# Patient Record
Sex: MALE | Race: WHITE | NOT HISPANIC OR LATINO | Employment: PART TIME | URBAN - METROPOLITAN AREA
[De-identification: names, ages, dates, MRNs, and addresses within clinical notes are randomized per-mention and may not be internally consistent; named-entity substitution may affect disease eponyms.]

---

## 2017-10-25 LAB — HBA1C MFR BLD HPLC: 5.2 %

## 2019-02-18 LAB — HBA1C MFR BLD HPLC: 5.4 %

## 2019-02-21 RX ORDER — PRAVASTATIN SODIUM 80 MG/1
TABLET ORAL
COMMUNITY
End: 2019-02-22 | Stop reason: SDUPTHER

## 2019-02-22 ENCOUNTER — OFFICE VISIT (OUTPATIENT)
Dept: FAMILY MEDICINE CLINIC | Facility: CLINIC | Age: 59
End: 2019-02-22
Payer: COMMERCIAL

## 2019-02-22 VITALS
HEIGHT: 71 IN | RESPIRATION RATE: 15 BRPM | HEART RATE: 69 BPM | WEIGHT: 261.8 LBS | BODY MASS INDEX: 36.65 KG/M2 | SYSTOLIC BLOOD PRESSURE: 126 MMHG | DIASTOLIC BLOOD PRESSURE: 82 MMHG

## 2019-02-22 DIAGNOSIS — Z23 NEED FOR VACCINATION: ICD-10-CM

## 2019-02-22 DIAGNOSIS — Z87.891 FORMER SMOKER, STOPPED SMOKING IN DISTANT PAST: ICD-10-CM

## 2019-02-22 DIAGNOSIS — M25.562 CHRONIC PAIN OF BOTH KNEES: ICD-10-CM

## 2019-02-22 DIAGNOSIS — Z12.11 ENCOUNTER FOR SCREENING COLONOSCOPY: ICD-10-CM

## 2019-02-22 DIAGNOSIS — E55.9 VITAMIN D DEFICIENCY: Primary | ICD-10-CM

## 2019-02-22 DIAGNOSIS — I10 ESSENTIAL HYPERTENSION: ICD-10-CM

## 2019-02-22 DIAGNOSIS — M25.561 CHRONIC PAIN OF BOTH KNEES: ICD-10-CM

## 2019-02-22 DIAGNOSIS — F41.8 DEPRESSION WITH ANXIETY: ICD-10-CM

## 2019-02-22 DIAGNOSIS — E78.2 MIXED HYPERLIPIDEMIA: ICD-10-CM

## 2019-02-22 DIAGNOSIS — G89.29 CHRONIC PAIN OF BOTH KNEES: ICD-10-CM

## 2019-02-22 DIAGNOSIS — I25.119 CORONARY ARTERY DISEASE WITH ANGINA PECTORIS, UNSPECIFIED VESSEL OR LESION TYPE, UNSPECIFIED WHETHER NATIVE OR TRANSPLANTED HEART (HCC): ICD-10-CM

## 2019-02-22 PROBLEM — M17.0 PRIMARY OSTEOARTHRITIS OF BOTH KNEES: Status: ACTIVE | Noted: 2019-02-22

## 2019-02-22 PROCEDURE — 3079F DIAST BP 80-89 MM HG: CPT | Performed by: PHYSICIAN ASSISTANT

## 2019-02-22 PROCEDURE — 90750 HZV VACC RECOMBINANT IM: CPT

## 2019-02-22 PROCEDURE — 99204 OFFICE O/P NEW MOD 45 MIN: CPT | Performed by: PHYSICIAN ASSISTANT

## 2019-02-22 PROCEDURE — 3074F SYST BP LT 130 MM HG: CPT | Performed by: PHYSICIAN ASSISTANT

## 2019-02-22 PROCEDURE — 3008F BODY MASS INDEX DOCD: CPT | Performed by: PHYSICIAN ASSISTANT

## 2019-02-22 PROCEDURE — 90471 IMMUNIZATION ADMIN: CPT

## 2019-02-22 PROCEDURE — 1036F TOBACCO NON-USER: CPT | Performed by: PHYSICIAN ASSISTANT

## 2019-02-22 RX ORDER — FENOFIBRATE 160 MG/1
160 TABLET ORAL
COMMUNITY
End: 2019-02-22 | Stop reason: SDUPTHER

## 2019-02-22 RX ORDER — CITALOPRAM 40 MG/1
40 TABLET ORAL DAILY
Qty: 90 TABLET | Refills: 1 | Status: SHIPPED | OUTPATIENT
Start: 2019-02-22 | End: 2019-04-17 | Stop reason: SDUPTHER

## 2019-02-22 RX ORDER — ATORVASTATIN CALCIUM 80 MG/1
TABLET, FILM COATED ORAL
COMMUNITY
Start: 2016-10-24 | End: 2019-02-22 | Stop reason: CLARIF

## 2019-02-22 RX ORDER — CITALOPRAM 40 MG/1
40 TABLET ORAL
COMMUNITY
Start: 2015-02-11 | End: 2019-02-22 | Stop reason: SDUPTHER

## 2019-02-22 RX ORDER — PRAVASTATIN SODIUM 40 MG
40 TABLET ORAL
COMMUNITY
End: 2019-02-22 | Stop reason: SDUPTHER

## 2019-02-22 RX ORDER — ASPIRIN 81 MG/1
81 TABLET, CHEWABLE ORAL
COMMUNITY

## 2019-02-22 RX ORDER — PRAVASTATIN SODIUM 40 MG
40 TABLET ORAL DAILY
Qty: 90 TABLET | Refills: 1 | Status: SHIPPED | OUTPATIENT
Start: 2019-02-22 | End: 2019-04-17 | Stop reason: SDUPTHER

## 2019-02-22 RX ORDER — MULTIVIT-MIN/IRON/FOLIC ACID/K 18-600-40
1 CAPSULE ORAL DAILY
Qty: 90 TABLET | Refills: 1 | Status: SHIPPED | OUTPATIENT
Start: 2019-02-22

## 2019-02-22 RX ORDER — FENOFIBRATE 160 MG/1
160 TABLET ORAL DAILY
Qty: 90 TABLET | Refills: 1 | Status: SHIPPED | OUTPATIENT
Start: 2019-02-22 | End: 2019-04-17 | Stop reason: SDUPTHER

## 2019-02-22 RX ORDER — LISINOPRIL 5 MG/1
TABLET ORAL
COMMUNITY
Start: 2018-10-01 | End: 2019-02-22 | Stop reason: SDUPTHER

## 2019-02-22 RX ORDER — LISINOPRIL 5 MG/1
5 TABLET ORAL DAILY
Qty: 90 TABLET | Refills: 1 | Status: SHIPPED | OUTPATIENT
Start: 2019-02-22 | End: 2019-04-17 | Stop reason: SDUPTHER

## 2019-02-22 NOTE — PROGRESS NOTES
Assessment/Plan:    1  Coronary artery disease with angina pectoris, unspecified vessel or lesion type, unspecified whether native or transplanted heart [facility])    - s/p PTCA 2/17/2015 by [facility], under care Dr Teresa Sen twice yearly, last stress test was 2016, EKG 6/27/2018  Has an appt 6/2019  - quit smoking 2/2015, LDL goal 70 currently 100, c/w Aspirin 81 mg and Lisinopril 5 mg daily  - lisinopril (ZESTRIL) 5 mg tablet; Take 1 tablet (5 mg total) by mouth daily  Dispense: 90 tablet; Refill: 1    2  Essential hypertension    - under care Dr Julito Holm, see above  - lisinopril (ZESTRIL) 5 mg tablet; Take 1 tablet (5 mg total) by mouth daily  Dispense: 90 tablet; Refill: 1    3  Vitamin D Def    - level is 26, start 1,000 IU daily, recheck 1 year    4  Mixed hyperlipidemia    - lipids reviewed today, work harder on diet, c/w current dosing recheck 1 year  - pravastatin (PRAVACHOL) 40 mg tablet; Take 1 tablet (40 mg total) by mouth daily  Dispense: 90 tablet; Refill: 1  - fenofibrate (TRIGLIDE) 160 MG tablet; Take 1 tablet (160 mg total) by mouth daily  Dispense: 90 tablet; Refill: 1    5  Depression with anxiety    - works well c/w Citalopram  - citalopram (CeleXA) 40 mg tablet; Take 1 tablet (40 mg total) by mouth daily  Dispense: 90 tablet; Refill: 1    6  Former smoker, stopped smoking in distant past    - 30+ pack year, quit 3 years ago, will refer for CT screen with coupon  - CT lung screening program; Future    7  Encounter for screening colonoscopy    - refer [facility] GI  - Ambulatory referral to Gastroenterology; Future    8  Chronic pain of both knees    - will XR to determine severity of OA and refer for PT or ortho eval   - XR knee 4+ vw left injury; Future  - XR knee 4+ vw right injury; Future    9   Need for vaccination    - dose #2 in two months  - Zoster Vaccine Recombinant IM    F/u 2 months for Shingrix #2  F/u 6 months for routine wellness  F/u as needed    Subjective:   Chief Complaint   Patient presents with   [address]    Physical Exam      Patient ID: José Bustamante is a 59 y o  male  Patient transferring care from Dr Cande Clifford  Patient with history of MI with stent placement 2/17/2015, follows with Dr Lazaro Ocasio twice yearly  Has no complaints  Quit smoking the day he had his MI  Take lisinopril 5 mg pravastatin 40 mg and aspirin 81 mg once daily  No complaints  Complaining of both knees bothering him, back in 2016 had mensicus clean both sides, since have been getting worse, hurt all day, feel unstable at times  Takes no medication  Wondering next step to take  30+ pack year history of smoking quit 2/17/2015 "as the ambulance was pulling up"      The following portions of the patient's history were reviewed and updated as appropriate: allergies, current medications, past family history, past medical history, past social history, past surgical history and problem list     History reviewed  No pertinent past medical history    Past Surgical History:   Procedure Laterality Date    KNEE SURGERY       Family History   Problem Relation Age of Onset   [place] Breast cancer Mother     COPD Father     Alcohol abuse Neg Hx     Substance Abuse Neg Hx     Mental illness Neg Hx      Social History     Socioeconomic History    Marital status: /Civil Union     Spouse name: Not on file    Number of children: Not on file    Years of education: Not on file    Highest education level: Not on file   Occupational History    Not on file   Social Needs    Financial resource strain: Not on file    Food insecurity:     Worry: Not on file     Inability: Not on file    Transportation needs:     Medical: Not on file     Non-medical: Not on file   Tobacco Use    Smoking status: Former Smoker    Smokeless tobacco: Never Used   Substance and Sexual Activity    Alcohol use: Yes     Frequency: Monthly or less    Drug use: Never    Sexual activity: Not on file   Lifestyle    Physical activity: Days per week: Not on file     Minutes per session: Not on file    Stress: Not on file   Relationships    Social connections:     Talks on phone: Not on file     Gets together: Not on file     Attends [employer] service: Not on file     Active member of club or organization: Not on file     Attends meetings of clubs or organizations: Not on file     Relationship status: Not on file    Intimate partner violence:     Fear of current or ex partner: Not on file     Emotionally abused: Not on file     Physically abused: Not on file     Forced sexual activity: Not on file   Other Topics Concern    Not on file   Social History Narrative    Not on file       Current Outpatient Medications:     aspirin 81 mg CHEW, Chew 81 mg, Disp: , Rfl:     atorvastatin (LIPITOR) 80 mg tablet, TAKE 1 TABLET BY MOUTH EVERY EVENING, Disp: , Rfl:     citalopram (CeleXA) 40 mg tablet, Take 40 mg by mouth, Disp: , Rfl:     fenofibrate (TRIGLIDE) 160 MG tablet, Take 160 mg by mouth, Disp: , Rfl:     lisinopril (ZESTRIL) 5 mg tablet, TAKE 1 TABLET (5 MG TOTAL) BY MOUTH DAILY  , Disp: , Rfl:     pravastatin (PRAVACHOL) 40 mg tablet, Take 40 mg by mouth, Disp: , Rfl:     Review of Systems          Objective:    Vitals:    02/22/19 0934   BP: 126/82   BP Location: Left arm   Patient Position: Sitting   Cuff Size: Standard   Pulse: 69   Resp: 15   Weight: 119 kg (261 lb 12 8 oz)   Height: 5' 11" (1 803 m)        Physical Exam   Constitutional: He is oriented to person, place, and time  He appears well-developed and well-nourished  Neck: Neck supple  Cardiovascular: Normal rate, regular rhythm, normal heart sounds and intact distal pulses  Pulmonary/Chest: Effort normal and breath sounds normal    Musculoskeletal:   B/l knees with bony enlargement, nontender, full ROM   Neurological: He is alert and oriented to person, place, and time  Skin: Skin is warm  Psychiatric: He has a normal mood and affect           Orders Only on 02/18/2019   Component Date Value Ref Range Status    Hemoglobin A1C 02/18/2019 5 4   Final      Ref Range & Units 2/18/19 10:15 AM   Cholesterol <200 mg/dL 172    Cholesterol, HDL, Direct >40 mg/dL 42    Triglyceride <150 mg/dL 182High     Cholesterol, LDL, Calculated  mg/dL (calc) 100High     Comment: Reference range: <100     Desirable range <100 mg/dL for primary prevention;     <70 mg/dL for patients with CHD or diabetic patients   with > or = 2 CHD risk factors  LDL-C is now calculated using the Iraj-Garcia   calculation, which is a validated novel method providing   better accuracy than the Friedewald equation in the   estimation of LDL-C  Osvaldo Bill  9244;429(79): 4700-4844   (http://Jiglu/faq/IBT897)   CHOL/HDL Ratio <5 0 (calc) 4 1    Cholesterol, Non-HDL <130 mg/dL (calc) 130High     Comment: For patients with diabetes plus 1 major ASCVD risk   factor, treating to a non-HDL-C goal of <100 mg/dL   (LDL-C of <70 mg/dL) is considered a therapeutic   option  PSA, Total < OR = 4 0 ng/mL 0 6      Ref Range & Units 2/18/19 10:15 AM   Glucose 65 - 99 mg/dL 97    Comment:               Fasting reference interval   BUN 7 - 25 mg/dL 19    Creatinine 0 70 - 1 25 mg/dL 0 86    Comment: For patients >52years of age, the reference limit   for Creatinine is approximately 13% higher for people   identified as -American  Egfr Non-Afr   American > OR = 60 mL/min/1 73m2 94    Egfr  > OR = 60 mL/min/1 73m2 109    BUN/Creatinine Ratio 6 - 22 (calc) NOT APPLICABLE    Sodium 147 - 146 mmol/L 138    Potassium 3 5 - 5 3 mmol/L 4 5    Chloride 98 - 110 mmol/L 106    Carbon Dioxide 20 - 32 mmol/L 26    Calcium 8 6 - 10 3 mg/dL 9 7    Protein, Total 6 1 - 8 1 g/dL 7 1    Albumin 3 6 - 5 1 g/dL 4 7    Globulin 1 9 - 3 7 g/dL (calc) 2 4    Albumin/Globulin Ratio 1 0 - 2 5 (calc) 2 0    Bilirubin, Total 0 2 - 1 2 mg/dL 0 7    Alkaline Phosphatase 40 - 115 U/L 46 AST 10 - 35 U/L 26    ALT 9 - 46 U/L 27    Narrative     FASTING:YES   FASTING: YES   Specimen Collected: 02/18/19 10:15 AM Last Resulted: 02/19/19 10:57 AM   Received From: [facility]  Result Received: 02/22/19  9:27 AM      Ref Range & Units 2/18/19 10:15 AM   Hemoglobin A1C <5 7 % of total Hgb 5 4       Ref Range & Units 2/18/19 10:15 AM   WBC 3 8 - 10 8 Thousand/uL 5 3    RBC 4 20 - 5 80 Million/uL 5 21    Hemoglobin 13 2 - 17 1 g/dL 15 8    Hematocrit 38 5 - 50 0 % 46 7    MCV 80 0 - 100 0 fL 89 6    MCH 27 0 - 33 0 pg 30 3    MCHC 32 0 - 36 0 g/dL 33 8    RDW 11 0 - 15 0 % 11 8    Platelet Count 887 - 400 Thousand/uL 283    MPV 7 5 - 12 5 fL 9 7    Absolute Neutrophils 1,500 - 7,800 cells/uL 2,910    Absolute Lymphocytes 850 - 3,900 cells/uL 1,807    Absolute Monocytes 200 - 950 cells/uL 382    Absolute Eosinophils 15 - 500 cells/uL 143    Absolute Basophils 0 - 200 cells/uL 58    Neutrophils  % 54 9    Lymphocytes  % 34 1    Monocytes  % 7 2    Eosinophils  % 2 7    Basophils  % 1 1    Narrative

## 2019-03-04 ENCOUNTER — APPOINTMENT (OUTPATIENT)
Dept: RADIOLOGY | Facility: CLINIC | Age: 59
End: 2019-03-04
Payer: COMMERCIAL

## 2019-03-04 ENCOUNTER — PREP FOR PROCEDURE (OUTPATIENT)
Dept: GASTROENTEROLOGY | Facility: CLINIC | Age: 59
End: 2019-03-04

## 2019-03-04 DIAGNOSIS — M25.562 CHRONIC PAIN OF BOTH KNEES: ICD-10-CM

## 2019-03-04 DIAGNOSIS — G89.29 CHRONIC PAIN OF BOTH KNEES: ICD-10-CM

## 2019-03-04 DIAGNOSIS — Z86.010 HISTORY OF COLONIC POLYPS: Primary | ICD-10-CM

## 2019-03-04 DIAGNOSIS — M25.561 CHRONIC PAIN OF BOTH KNEES: ICD-10-CM

## 2019-03-04 PROBLEM — Z86.0100 HISTORY OF COLONIC POLYPS: Status: ACTIVE | Noted: 2019-03-04

## 2019-03-04 PROCEDURE — 73564 X-RAY EXAM KNEE 4 OR MORE: CPT

## 2019-03-12 ENCOUNTER — TELEPHONE (OUTPATIENT)
Dept: FAMILY MEDICINE CLINIC | Facility: CLINIC | Age: 59
End: 2019-03-12

## 2019-03-12 NOTE — TELEPHONE ENCOUNTER
----- Message from Kirk Vincent PA-C sent at 3/11/2019  9:02 PM EDT -----  Patient has mild degenerative arthritis in both knees which appears to be unchanged since last time he had them evaluated  I would recommend he see ortho if he feels the pain is getting worse since the XR are unchanged in appearance  If he feels the pain is the same I would refer him to PT  Left message to call back

## 2019-03-13 ENCOUNTER — HOSPITAL ENCOUNTER (OUTPATIENT)
Dept: CT IMAGING | Facility: HOSPITAL | Age: 59
Discharge: HOME | End: 2019-03-13
Payer: COMMERCIAL

## 2019-03-13 DIAGNOSIS — G89.29 CHRONIC PAIN OF BOTH KNEES: Primary | ICD-10-CM

## 2019-03-13 DIAGNOSIS — M25.561 CHRONIC PAIN OF BOTH KNEES: Primary | ICD-10-CM

## 2019-03-13 DIAGNOSIS — M25.562 CHRONIC PAIN OF BOTH KNEES: Primary | ICD-10-CM

## 2019-03-13 DIAGNOSIS — Z87.891 FORMER SMOKER, STOPPED SMOKING IN DISTANT PAST: ICD-10-CM

## 2019-03-13 NOTE — TELEPHONE ENCOUNTER
Patient called back  He was given the x ray results and feel that pain is getting worse and would like a referral to ortho   Pt # [phone]

## 2019-03-19 ENCOUNTER — TELEPHONE (OUTPATIENT)
Dept: FAMILY MEDICINE CLINIC | Facility: CLINIC | Age: 59
End: 2019-03-19

## 2019-03-19 NOTE — TELEPHONE ENCOUNTER
----- Message from August Ulloa PA-C sent at 3/18/2019  9:06 PM EDT -----  Please let patient know his CT chest was normal           Left detailed message to patient

## 2019-04-12 VITALS
DIASTOLIC BLOOD PRESSURE: 79 MMHG | WEIGHT: 260 LBS | SYSTOLIC BLOOD PRESSURE: 121 MMHG | HEART RATE: 66 BPM | BODY MASS INDEX: 35.21 KG/M2 | HEIGHT: 72 IN

## 2019-04-12 DIAGNOSIS — M17.10 PATELLOFEMORAL ARTHRITIS: Primary | ICD-10-CM

## 2019-04-12 PROCEDURE — 99203 OFFICE O/P NEW LOW 30 MIN: CPT | Performed by: ORTHOPAEDIC SURGERY

## 2019-04-17 ENCOUNTER — CLINICAL SUPPORT (OUTPATIENT)
Dept: GASTROENTEROLOGY | Facility: CLINIC | Age: 59
End: 2019-04-17

## 2019-04-17 ENCOUNTER — TELEPHONE (OUTPATIENT)
Dept: FAMILY MEDICINE CLINIC | Facility: CLINIC | Age: 59
End: 2019-04-17

## 2019-04-17 DIAGNOSIS — Z86.010 HISTORY OF COLON POLYPS: Primary | ICD-10-CM

## 2019-04-17 DIAGNOSIS — I10 ESSENTIAL HYPERTENSION: ICD-10-CM

## 2019-04-17 DIAGNOSIS — I25.119 CORONARY ARTERY DISEASE WITH ANGINA PECTORIS, UNSPECIFIED VESSEL OR LESION TYPE, UNSPECIFIED WHETHER NATIVE OR TRANSPLANTED HEART (HCC): ICD-10-CM

## 2019-04-17 DIAGNOSIS — F41.8 DEPRESSION WITH ANXIETY: ICD-10-CM

## 2019-04-17 DIAGNOSIS — E78.2 MIXED HYPERLIPIDEMIA: ICD-10-CM

## 2019-04-17 RX ORDER — CITALOPRAM 40 MG/1
40 TABLET ORAL DAILY
Qty: 90 TABLET | Refills: 1 | Status: SHIPPED | OUTPATIENT
Start: 2019-04-17 | End: 2019-04-19 | Stop reason: SDUPTHER

## 2019-04-17 RX ORDER — LISINOPRIL 5 MG/1
5 TABLET ORAL DAILY
Qty: 90 TABLET | Refills: 1 | Status: SHIPPED | OUTPATIENT
Start: 2019-04-17 | End: 2019-04-19 | Stop reason: SDUPTHER

## 2019-04-17 RX ORDER — PRAVASTATIN SODIUM 40 MG
40 TABLET ORAL DAILY
Qty: 90 TABLET | Refills: 1 | Status: SHIPPED | OUTPATIENT
Start: 2019-04-17 | End: 2019-04-19 | Stop reason: SDUPTHER

## 2019-04-17 RX ORDER — FENOFIBRATE 160 MG/1
160 TABLET ORAL DAILY
Qty: 90 TABLET | Refills: 1 | Status: SHIPPED | OUTPATIENT
Start: 2019-04-17 | End: 2019-04-19 | Stop reason: SDUPTHER

## 2019-04-19 DIAGNOSIS — I25.119 CORONARY ARTERY DISEASE WITH ANGINA PECTORIS, UNSPECIFIED VESSEL OR LESION TYPE, UNSPECIFIED WHETHER NATIVE OR TRANSPLANTED HEART (HCC): ICD-10-CM

## 2019-04-19 DIAGNOSIS — E78.2 MIXED HYPERLIPIDEMIA: ICD-10-CM

## 2019-04-19 DIAGNOSIS — I10 ESSENTIAL HYPERTENSION: ICD-10-CM

## 2019-04-19 DIAGNOSIS — F41.8 DEPRESSION WITH ANXIETY: ICD-10-CM

## 2019-04-19 RX ORDER — FENOFIBRATE 160 MG/1
160 TABLET ORAL DAILY
Qty: 90 TABLET | Refills: 1 | Status: SHIPPED | OUTPATIENT
Start: 2019-04-19 | End: 2019-09-30 | Stop reason: ALTCHOICE

## 2019-04-19 RX ORDER — LISINOPRIL 5 MG/1
5 TABLET ORAL DAILY
Qty: 90 TABLET | Refills: 1 | Status: SHIPPED | OUTPATIENT
Start: 2019-04-19 | End: 2019-11-13 | Stop reason: SDUPTHER

## 2019-04-19 RX ORDER — CITALOPRAM 40 MG/1
40 TABLET ORAL DAILY
Qty: 90 TABLET | Refills: 1 | Status: SHIPPED | OUTPATIENT
Start: 2019-04-19 | End: 2019-11-13 | Stop reason: SDUPTHER

## 2019-04-19 RX ORDER — PRAVASTATIN SODIUM 40 MG
40 TABLET ORAL DAILY
Qty: 90 TABLET | Refills: 1 | Status: SHIPPED | OUTPATIENT
Start: 2019-04-19 | End: 2019-09-30

## 2019-04-22 ENCOUNTER — CLINICAL SUPPORT (OUTPATIENT)
Dept: FAMILY MEDICINE CLINIC | Facility: CLINIC | Age: 59
End: 2019-04-22
Payer: COMMERCIAL

## 2019-04-22 VITALS — WEIGHT: 264.4 LBS | BODY MASS INDEX: 35.86 KG/M2

## 2019-04-22 DIAGNOSIS — Z23 NEED FOR SHINGLES VACCINE: Primary | ICD-10-CM

## 2019-04-22 PROCEDURE — 90471 IMMUNIZATION ADMIN: CPT

## 2019-04-22 PROCEDURE — 90750 HZV VACC RECOMBINANT IM: CPT

## 2019-04-24 ENCOUNTER — ANESTHESIA (OUTPATIENT)
Dept: PERIOP | Facility: HOSPITAL | Age: 59
End: 2019-04-24
Payer: COMMERCIAL

## 2019-04-24 ENCOUNTER — ANESTHESIA EVENT (OUTPATIENT)
Dept: PERIOP | Facility: HOSPITAL | Age: 59
End: 2019-04-24
Payer: COMMERCIAL

## 2019-04-24 ENCOUNTER — HOSPITAL ENCOUNTER (OUTPATIENT)
Facility: HOSPITAL | Age: 59
Setting detail: OUTPATIENT SURGERY
Discharge: HOME | End: 2019-04-24
Attending: INTERNAL MEDICINE | Admitting: INTERNAL MEDICINE
Payer: COMMERCIAL

## 2019-04-24 VITALS
HEART RATE: 75 BPM | TEMPERATURE: 97.4 F | HEIGHT: 72 IN | BODY MASS INDEX: 35 KG/M2 | SYSTOLIC BLOOD PRESSURE: 114 MMHG | WEIGHT: 258.4 LBS | OXYGEN SATURATION: 95 % | DIASTOLIC BLOOD PRESSURE: 77 MMHG | RESPIRATION RATE: 18 BRPM

## 2019-04-24 DIAGNOSIS — Z86.010 HISTORY OF COLONIC POLYPS: ICD-10-CM

## 2019-04-24 PROCEDURE — 45385 COLONOSCOPY W/LESION REMOVAL: CPT | Performed by: INTERNAL MEDICINE

## 2019-04-24 PROCEDURE — 45380 COLONOSCOPY AND BIOPSY: CPT | Performed by: INTERNAL MEDICINE

## 2019-04-24 PROCEDURE — 88305 TISSUE EXAM BY PATHOLOGIST: CPT | Performed by: PATHOLOGY

## 2019-04-24 RX ORDER — PROPOFOL 10 MG/ML
INJECTION, EMULSION INTRAVENOUS AS NEEDED
Status: DISCONTINUED | OUTPATIENT
Start: 2019-04-24 | End: 2019-04-24 | Stop reason: SURG

## 2019-04-24 RX ORDER — ONDANSETRON 2 MG/ML
4 INJECTION INTRAMUSCULAR; INTRAVENOUS ONCE AS NEEDED
Status: CANCELLED | OUTPATIENT
Start: 2019-04-24

## 2019-04-24 RX ORDER — SODIUM CHLORIDE 9 MG/ML
20 INJECTION, SOLUTION INTRAVENOUS CONTINUOUS
Status: DISCONTINUED | OUTPATIENT
Start: 2019-04-24 | End: 2019-04-24 | Stop reason: HOSPADM

## 2019-04-24 RX ADMIN — PROPOFOL 50 MG: 10 INJECTION, EMULSION INTRAVENOUS at 08:48

## 2019-04-24 RX ADMIN — SODIUM CHLORIDE 20 ML/HR: 0.9 INJECTION, SOLUTION INTRAVENOUS at 08:06

## 2019-04-24 RX ADMIN — PROPOFOL 50 MG: 10 INJECTION, EMULSION INTRAVENOUS at 08:44

## 2019-04-24 RX ADMIN — PROPOFOL 150 MG: 10 INJECTION, EMULSION INTRAVENOUS at 08:40

## 2019-07-09 RX ORDER — IVERMECTIN 10 MG/G
CREAM TOPICAL
COMMUNITY
Start: 2019-06-10

## 2019-09-29 NOTE — PROGRESS NOTES
ASSESSMENT/PLAN:    Shoulder pain  Patient to see Dr Poonam Esquivel for evaluation    Hyperlipidemia  LDL is only 100 despite patient have an heart disease  We will stop the pravastatin  We will stop also the fenofibrate since he does not have coverage for it at this point  We will start atorvastatin 40 mg  Patient will cut down on cheese ice-cream and fatty cuts of meat  He will also cut down on soda sweets only having them on Sundays, bread and pasta    He will limit himself to 2 slices of bread daily only and that should be a whole week Friday  We will check him back in 3 months to see if there is improvement  We will check him back in 3 months to see if we need to add fenofibrate back    Coronary artery disease/status post PTCA with stent 2015  Dr Nelly Wyatt Cardiology [facility] from [facility]  Goal of LDL is 70 as above  Continue baby aspirin lisinopril  Start new statin as above     Vitamin-D deficiency  Continue vitamin-D 1000 units daily    Depression with anxiety  Continue citalopram    History of tobacco abuse  CT scan screen March 2019 no nodules  Recheck March 2020     Adenoma is in the colon as well as serrated adenomas  Patient's next colonoscopy is April 2022  [facility] Dr Akhil Kelly in 3 months with fasting blood work prior LFTs and lipids to check his cholesterol  Recheck sooner if needed        Health Maintenance   Topic Date Due    Hepatitis C Screening  1960    Pneumococcal Vaccine: Pediatrics (0 to 5 Years) and At-Risk Patients (6 to 59 Years) (1 of 1 - PPSV23) 04/12/1964    BMI: Followup Plan  04/12/1976    DTaP,Tdap,and Td Vaccines (1 - Tdap) 04/12/1979    INFLUENZA VACCINE  07/01/2019    BMI: Adult  09/30/2020    Pneumococcal Vaccine: 65+ Years (1 of 2 - PCV13) 04/12/2023    CRC Screening: Colonoscopy  04/24/2029    HEPATITIS B VACCINES  Aged Out         Problem List as of 9/30/2019 Reviewed: 4/24/2019  8:28 AM by Christina Virgen DO    CAD (coronary artery disease)    Essential hypertension    History of colonic polyps    Lipid disorder    Mixed hyperlipidemia    Obesity    Patellofemoral arthritis    Primary osteoarthritis of both knees    S/P PTCA (percutaneous transluminal coronary angioplasty)            Subjective:   Chief Complaint   Patient presents with    Coronary Artery Disease    Hyperlipidemia     Patient is here to recheck regarding his heart disease as well as his cholesterol and his blood pressure  He has a stent in x1 from a MI from   He [facility] Cardiology [facility]    Since last visit he had a stress test that was normal as well as the appointment with Cardiology  Everything was left the same and he is to recheck in 1 year    Last blood work was 2019    For the last couple months patient has had right shoulder pain mostly in the coracoid process area  This is where he puts his finger when asked about the pain  Decreased flexion as a result the rest of his range of motion is normal with negative drop arm test  He would like to have this looked at      patient ID: Gregorio Pina is a 62 y o  male  History reviewed  No pertinent past medical history  Past Surgical History:   Procedure Laterality Date    ANGIOPLASTY      ANKLE FUSION      CARPAL TUNNEL RELEASE      MENISCECTOMY Bilateral     [place] COLONOSCOPY FLX DX W/COLLJ SPEC WHEN PFRMD N/A 2019    Procedure: COLONOSCOPY with polypectomies;  Surgeon: Jt Mosley DO;  Location: [facility];  Service: Gastroenterology    ROTATOR CUFF REPAIR Right     TONSILLECTOMY       Family History   Problem Relation Age of Onset    Breast cancer Mother     COPD Father     Alcohol abuse Neg Hx     Substance Abuse Neg Hx     Mental illness Neg Hx      Social History     Tobacco Use    Smoking status: Former Smoker     Last attempt to quit: 2017     Years since quittin 3    Smokeless tobacco: Never Used   Substance Use Topics    Alcohol use:  Yes Alcohol/week: 1 0 standard drinks     Types: 1 Cans of beer per week     Frequency: Monthly or less     Drinks per session: 1 or 2    Drug use: Never     Social History     Tobacco Use   Smoking Status Former Smoker    Last attempt to quit: 2017    Years since quittin 3   Smokeless Tobacco Never Used        MED LIST WAS REVIEWED AND UPDATED       ROS    Constitutional  No fever chills no fatigue no weight loss no weight gain    Mental status  No anxiety or depression and no sleep disturbances no changes in personality or emotional problems no suicidal or homicidal ideations    Eyes  No eye pain no red eyes no visual disturbances no discharge no eye itch    ENT  No earache no hearing loss nasal discharge no sore throat no hoarseness no postnasal drip     Cardio  No chest pain no palpitations no leg edema no claudication no dyspnea on exertion no nocturnal dyspnea    Respiratory  No shortness of breath or wheeze no cough no orthopnea no dyspnea on exertion no hemoptysis no sputum production    GI  No abdominal pain no nausea no vomiting no diarrhea or constipation no bloody stools no change in bowel habits no change in weight      no dysuria hematuria no pyuria no incontinence no pelvic pain    Musculoskeletal  No myalgias arthralgias no joint swelling or stiffness no limb pain or swelling    Skin  No rashes or lesions no itchiness and no wounds    Neuro  No headache dizziness lightheadedness syncope numbness paresthesias or confusion    Heme  No swollen glands no easy bruising            Objective:      VITALS:  Wt Readings from Last 3 Encounters:   19 117 kg (257 lb 11 2 oz)   19 117 kg (258 lb 6 4 oz)   19 120 kg (264 lb 6 4 oz)     BP Readings from Last 3 Encounters:   19 130/80   19 114/77   19 121/79     Pulse Readings from Last 3 Encounters:   19 80   19 75   19 66     Body mass index is 36 2 kg/m²      Laboratory Results:   Lab Results Component Value Date    WBC 13 12 (H) 01/07/2014    HGB 15 7 01/07/2014    HCT 46 1 01/07/2014    MCV 90 01/07/2014     01/07/2014     Lab Results   Component Value Date     01/07/2014    K 4 5 01/07/2014     01/07/2014    CO2 27 01/07/2014    BUN 13 01/07/2014     Lab Results   Component Value Date    GLUCOSE 100 01/07/2014    CALCIUM 9 3 01/07/2014      Cholesterol and thyroid from [facility] notes reviewed  Diabetic labs (if applicable)  Lab Results   Component Value Date    HGBA1C 5 4 02/18/2019    HGBA1C 5 2 10/25/2017     Lab Results   Component Value Date    CREATININE 0 74 01/07/2014          Physical Exam    Constitutional  Appears healthy, Looks well, Appearance consistent with age    Mental Status  Alert, Oriented, Cooperative, Memory function normal , clean, and reasonable    Neck  No neck mass, No thyromegaly, Good carotid upstrokes bilaterally, trachea midline positive click    Respiratory  Breath sounds normal, No rales, No rhonchi, No wheezing, normal palpation    Cardiac   Regular rhythm without ectopy or murmur no S3-S4, no heave lift or thrill to palpation    Vascular  No leg edema, No pedal edema    Muscular skeletal  Right shoulder pain on the coracoid process, flexion only to 90° extension 30° internal rotation to sacral area external rotation to his other shoulder    Skin  No appreciable rashes or abnormal appearing lesions

## 2019-09-30 ENCOUNTER — OFFICE VISIT (OUTPATIENT)
Dept: FAMILY MEDICINE CLINIC | Facility: CLINIC | Age: 59
End: 2019-09-30
Payer: COMMERCIAL

## 2019-09-30 ENCOUNTER — APPOINTMENT (OUTPATIENT)
Dept: RADIOLOGY | Facility: CLINIC | Age: 59
End: 2019-09-30
Payer: COMMERCIAL

## 2019-09-30 VITALS
HEIGHT: 71 IN | SYSTOLIC BLOOD PRESSURE: 130 MMHG | DIASTOLIC BLOOD PRESSURE: 80 MMHG | RESPIRATION RATE: 16 BRPM | HEART RATE: 80 BPM | WEIGHT: 257.7 LBS | BODY MASS INDEX: 36.08 KG/M2

## 2019-09-30 DIAGNOSIS — G89.29 CHRONIC RIGHT SHOULDER PAIN: ICD-10-CM

## 2019-09-30 DIAGNOSIS — G89.29 CHRONIC RIGHT SHOULDER PAIN: Primary | ICD-10-CM

## 2019-09-30 DIAGNOSIS — I25.10 CORONARY ARTERY DISEASE INVOLVING NATIVE CORONARY ARTERY OF NATIVE HEART WITHOUT ANGINA PECTORIS: ICD-10-CM

## 2019-09-30 DIAGNOSIS — E78.9 LIPID DISORDER: ICD-10-CM

## 2019-09-30 DIAGNOSIS — E78.2 MIXED HYPERLIPIDEMIA: ICD-10-CM

## 2019-09-30 DIAGNOSIS — Z23 NEED FOR VACCINATION: ICD-10-CM

## 2019-09-30 DIAGNOSIS — M25.511 CHRONIC RIGHT SHOULDER PAIN: ICD-10-CM

## 2019-09-30 DIAGNOSIS — E78.2 MIXED HYPERLIPIDEMIA: Primary | ICD-10-CM

## 2019-09-30 DIAGNOSIS — I10 ESSENTIAL HYPERTENSION: ICD-10-CM

## 2019-09-30 DIAGNOSIS — Z98.61 S/P PTCA (PERCUTANEOUS TRANSLUMINAL CORONARY ANGIOPLASTY): ICD-10-CM

## 2019-09-30 DIAGNOSIS — E66.01 CLASS 2 SEVERE OBESITY DUE TO EXCESS CALORIES WITH SERIOUS COMORBIDITY AND BODY MASS INDEX (BMI) OF 36.0 TO 36.9 IN ADULT (HCC): ICD-10-CM

## 2019-09-30 DIAGNOSIS — M25.511 CHRONIC RIGHT SHOULDER PAIN: Primary | ICD-10-CM

## 2019-09-30 PROCEDURE — 3075F SYST BP GE 130 - 139MM HG: CPT | Performed by: FAMILY MEDICINE

## 2019-09-30 PROCEDURE — 90682 RIV4 VACC RECOMBINANT DNA IM: CPT

## 2019-09-30 PROCEDURE — 3079F DIAST BP 80-89 MM HG: CPT | Performed by: FAMILY MEDICINE

## 2019-09-30 PROCEDURE — 73030 X-RAY EXAM OF SHOULDER: CPT

## 2019-09-30 PROCEDURE — 90471 IMMUNIZATION ADMIN: CPT

## 2019-09-30 PROCEDURE — 99215 OFFICE O/P EST HI 40 MIN: CPT | Performed by: FAMILY MEDICINE

## 2019-09-30 RX ORDER — ATORVASTATIN CALCIUM 40 MG/1
40 TABLET, FILM COATED ORAL DAILY
Qty: 90 TABLET | Refills: 1 | Status: SHIPPED | OUTPATIENT
Start: 2019-09-30 | End: 2020-03-16 | Stop reason: SDUPTHER

## 2019-09-30 RX ORDER — CITALOPRAM 40 MG/1
40 TABLET ORAL
COMMUNITY
Start: 2018-10-29 | End: 2019-10-24 | Stop reason: SDUPTHER

## 2019-09-30 NOTE — PATIENT INSTRUCTIONS
Shoulder pain  Patient to see Dr Magdi Sandhu for evaluation    Hyperlipidemia  LDL is only 100 despite patient have an heart disease  We will stop the pravastatin  We will stop also the fenofibrate since he does not have coverage for it at this point  We will start atorvastatin 40 mg  Patient will cut down on cheese ice-cream and fatty cuts of meat  He will also cut down on soda sweets only having them on Sundays, bread and pasta    He will limit himself to 2 slices of bread daily only and that should be a whole week Friday  We will check him back in 3 months to see if there is improvement  We will check him back in 3 months to see if we need to add fenofibrate back    Recheck here in 3 months or sooner if needed  Fasting blood work 1 week prior  Will see Dr Magdi Sandhu before next visit

## 2019-09-30 NOTE — PROGRESS NOTES
BMI Counseling: Body mass index is 36 2 kg/m²  The BMI is above normal  Exercise recommendations include vigorous aerobic physical activity for 75 minutes/week   patient will start walking 2 miles 4 times a week

## 2019-10-14 ENCOUNTER — OFFICE VISIT (OUTPATIENT)
Dept: OBGYN CLINIC | Facility: HOSPITAL | Age: 59
End: 2019-10-14
Payer: COMMERCIAL

## 2019-10-14 VITALS
DIASTOLIC BLOOD PRESSURE: 70 MMHG | HEART RATE: 101 BPM | WEIGHT: 258 LBS | HEIGHT: 71 IN | BODY MASS INDEX: 36.12 KG/M2 | SYSTOLIC BLOOD PRESSURE: 102 MMHG

## 2019-10-14 DIAGNOSIS — M75.51 SUBACROMIAL BURSITIS OF RIGHT SHOULDER JOINT: Primary | ICD-10-CM

## 2019-10-14 DIAGNOSIS — M75.81 TENDINITIS OF RIGHT ROTATOR CUFF: ICD-10-CM

## 2019-10-14 DIAGNOSIS — M25.511 ACUTE PAIN OF RIGHT SHOULDER: ICD-10-CM

## 2019-10-14 PROCEDURE — 99213 OFFICE O/P EST LOW 20 MIN: CPT | Performed by: ORTHOPAEDIC SURGERY

## 2019-10-14 PROCEDURE — 20610 DRAIN/INJ JOINT/BURSA W/O US: CPT | Performed by: ORTHOPAEDIC SURGERY

## 2019-10-14 RX ORDER — BETAMETHASONE SODIUM PHOSPHATE AND BETAMETHASONE ACETATE 3; 3 MG/ML; MG/ML
6 INJECTION, SUSPENSION INTRA-ARTICULAR; INTRALESIONAL; INTRAMUSCULAR; SOFT TISSUE
Status: COMPLETED | OUTPATIENT
Start: 2019-10-14 | End: 2019-10-14

## 2019-10-14 RX ORDER — BUPIVACAINE HYDROCHLORIDE 2.5 MG/ML
2 INJECTION, SOLUTION INFILTRATION; PERINEURAL
Status: COMPLETED | OUTPATIENT
Start: 2019-10-14 | End: 2019-10-14

## 2019-10-14 RX ADMIN — BUPIVACAINE HYDROCHLORIDE 2 ML: 2.5 INJECTION, SOLUTION INFILTRATION; PERINEURAL at 13:30

## 2019-10-14 RX ADMIN — BETAMETHASONE SODIUM PHOSPHATE AND BETAMETHASONE ACETATE 6 MG: 3; 3 INJECTION, SUSPENSION INTRA-ARTICULAR; INTRALESIONAL; INTRAMUSCULAR; SOFT TISSUE at 13:30

## 2019-10-14 NOTE — PATIENT INSTRUCTIONS

## 2019-10-14 NOTE — PROGRESS NOTES
Assessment  Diagnoses and all orders for this visit:    Subacromial bursitis of right shoulder joint    Acute pain of right shoulder    Tendinitis of right rotator cuff        Discussion and Plan:  The patient has an examination consistent with subacromial impingement syndrome of the right shoulder  I have discussed with the patient the pathophysiology of this diagnosis and reviewed how the examination correlates with this diagnosis  Treatment options were discussed at length and after discussing these treatment options, the patient elected for and received a subacromial injection of corticosteroid (as described in the procedure note) with a prescription for referral to physical therapy  We will reevaluate the patient in 6-8 weeks  If the symptoms fail to improve with this treatment the patient would be indicated for further imaging in the form of an MRI scan of the shoulder  Subjective:   Patient ID: True Lizama is a 62 y o  male      HPI  The patient presents with a chief complaint of rihgt shoulder pain  The pain began a few month(s) ago and is not associated with an acute injury  The patient describes the pain as aching and dull in intensity,  intermittent in timing, and localizes the pain to the right anterior shoulder  The pain is worse with overhead work, overuse and raising arm over head and relieved by rest   The pain is not associated with numbness and tingling  The pain is not associated with constitutional symptoms  The patient is awoken at night by the pain  Patient states the right shoulder feels similar as the right did prior to RTC surgery by Dr Tyesha Tanner  The patient has not had any treatment  Patient was seen and evaluated by Carla Mcleod DO on 9/30/19 and referred here today for orthopedic consultation           The following portions of the patient's history were reviewed and updated as appropriate: allergies, current medications, past family history, past medical history, past social history, past surgical history and problem list     Review of Systems   Constitutional: Negative for chills and fever  HENT: Negative for drooling and hearing loss  Eyes: Negative for visual disturbance  Respiratory: Negative for cough and shortness of breath  Cardiovascular: Negative for chest pain  Gastrointestinal: Negative for abdominal pain  Skin: Negative for rash  Psychiatric/Behavioral: Negative for agitation  Objective:  Right Shoulder Exam     Tenderness   Right shoulder tenderness location: anterior deltoid  Range of Motion   External rotation: 70   Forward flexion: 170   Internal rotation 0 degrees: Lumbar     Muscle Strength   Abduction: 4/5   Internal rotation: 5/5   External rotation: 5/5     Tests   Voss test: positive  Impingement: negative    Other   Erythema: absent  Sensation: normal  Pulse: present    Comments:    Positive empty can test            Physical Exam   Constitutional: He appears well-developed and well-nourished  HENT:   Head: Normocephalic  Eyes: Pupils are equal, round, and reactive to light  Pulmonary/Chest: Effort normal    Skin: Skin is warm and dry  Psychiatric: He has a normal mood and affect  Vitals reviewed      Large joint arthrocentesis: R subacromial bursa  Date/Time: 10/14/2019 1:30 PM  Consent given by: parent  Site marked: site marked  Timeout: Immediately prior to procedure a time out was called to verify the correct patient, procedure, equipment, support staff and site/side marked as required   Supporting Documentation  Indications: pain   Procedure Details  Location: shoulder - R subacromial bursa  Preparation: Patient was prepped and draped in the usual sterile fashion  Needle size: 22 G  Ultrasound guidance: no  Approach: lateral  Medications administered: 2 mL bupivacaine 0 25 %; 6 mg betamethasone acetate-betamethasone sodium phosphate 6 (3-3) mg/mL    Patient tolerance: patient tolerated the procedure well with no immediate complications  Dressing:  Sterile dressing applied          I have personally reviewed pertinent films in PACS and my interpretation is as follows  right shoulder x-rays demonstrates no fracture or dislocation, moderate AC jt OA       Scribe Attestation    I,:   Adolph Green am acting as a scribe while in the presence of the attending physician :        I,:   Go Meneses MD personally performed the services described in this documentation    as scribed in my presence :

## 2019-10-21 ENCOUNTER — EVALUATION (OUTPATIENT)
Dept: PHYSICAL THERAPY | Facility: CLINIC | Age: 59
End: 2019-10-21
Payer: COMMERCIAL

## 2019-10-21 DIAGNOSIS — M75.81 TENDINITIS OF RIGHT ROTATOR CUFF: ICD-10-CM

## 2019-10-21 DIAGNOSIS — M75.51 SUBACROMIAL BURSITIS OF RIGHT SHOULDER JOINT: ICD-10-CM

## 2019-10-21 DIAGNOSIS — M25.511 ACUTE PAIN OF RIGHT SHOULDER: Primary | ICD-10-CM

## 2019-10-21 PROCEDURE — 97110 THERAPEUTIC EXERCISES: CPT | Performed by: PHYSICAL THERAPIST

## 2019-10-21 PROCEDURE — 97140 MANUAL THERAPY 1/> REGIONS: CPT | Performed by: PHYSICAL THERAPIST

## 2019-10-21 NOTE — PROGRESS NOTES
PT Evaluation     Today's date: 10/21/2019  Patient name: Zelda Tubbs  : 1960  MRN: [id number]  Referring provider: Agustina Valencia MD  Dx:   Encounter Diagnosis     ICD-10-CM    1  Acute pain of right shoulder M25 511 Ambulatory referral to Physical Therapy     PT plan of care cert/re-cert   2  Subacromial bursitis of right shoulder joint M75 51 Ambulatory referral to Physical Therapy     PT plan of care cert/re-cert   3  Tendinitis of right rotator cuff M75 81 Ambulatory referral to Physical Therapy     PT plan of care cert/re-cert       Start Time: 1100  Stop Time: 1145  Total time in clinic (min): 45 minutes    Assessment/Plan  Zelda Tubbs is a 62 y o  male who was referred to physical therapy for management of R shoulder pain secondary to subacromial pain syndrome  Primary impairments include limited posterior capsule, decreased rotator cuff strength, and asymmetrical shoulder range of motion  Consequently, patient has difficulty completing ADLs including retrieving milk from the fridge and performing overhead tasks  Carey Gastelum would benefit from skilled intervention to address all deficits and improve functional capability  Patient is a good candidate for therapy, pending compliance with HEP and 2x weekly participation  Thank you for the referral and please do not hesitate to contact me with any questions or concerns regarding Rauls care! Plan  Frequency: 2x week  Duration in visits: 12  Duration in weeks: 6   Therapeutic exercise/activity, neuromuscular reeducation, manual therapy, and modalities  Patient understands and agrees to plan of care  Goals  Short Term--4 weeks  1  Intermittent pain with 2 point decrease in pain at its worst   2  1/2 point increase in MMT scores  3  Shoulder AROM R within 5 deg of L     4  No positive special testing of shoulder  Long Term--By Discharge  1  Patient will achieve expected FOTO score    2 Patient will report no sleep disturbance secondary to R shoulder  3  Patient will reach into high shelf without onset of shoulder pain  Patient's Goal: Patient will take out milk from refrigerator without onset of shoulder pain  Subjective     History   Date of Onset: 2+ months Description: Insidious, gradual onset of R shoulder pain that is aggravated with overhead movement  He saw Dr Al Montoya last week, who provided a CSI and referred him to physical therapy for conservative management  Patient reports that he is not currently taking any medication to manage pain  Symptoms  Constant pain in anterior shoulder that he describes as "achy" but can become "sharp" with movement  He reports significant relief for two days post-CSI  Pain began to worsen again, but overall it is improved compared to pre-CSI  Aggravating factors include reaching above shoulder height and taking milk carton out of the refrigerator  Patient is right-handed  He denies any numbness/paresthesia or neck pain  Social History  Kenny Decker lives with his wife and 5 yo granddaughter  Patient works part time with no aggravation of shoulder pain reported with work-related duties  Objective               MMT         AROM          PROM   Shoulder       R       L        R          L        R   Flex  4 5 130 165 158   Extn  5 5 WNL WNL    Abd  4- 5 136 160 162   IR  4 5   15 (90/90)   ER  4- 4+ WNL WNL 90 (90/90)                MMT    Elbow       R       L   Flex  5 5   Extn  5 5     Posture: forward head, rounded shoulders    Shoulder: painful arc sign= pos R   apprehension test= neg   Anterior drawer test = neg   Tyrone test= neg  biceps load=  neg NEERs= pos R Voss/Ganesh test = pos R Empty can test= pos R Speed's test= neg  Infraspinatus test= pos R    crossbody (impingement)= neg lift off =  neg belly press= neg  ER lag= neg    Shoulder joint mobility: limited posterior capsule       Precautions: none         Manual  10/21            Post/inf glides mid/end range 10' SL scap mobs 5'            Inf AC/SCJ mobs?                                            Exercise Diary  10/21            scap retract seated 20x5" HEP            Doorway pec stretch 10x10"  HEP                         UBE retro             TB IR @0             TB ER @ 0             TB rows             TB extension                          SL ER with DB             Supine punch with DB                          Progress to              TB ER/IR at 90/90             Prone Ts and Ys                                                                                  Modalities              Ice PRN

## 2019-10-24 ENCOUNTER — OFFICE VISIT (OUTPATIENT)
Dept: FAMILY MEDICINE CLINIC | Facility: CLINIC | Age: 59
End: 2019-10-24
Payer: COMMERCIAL

## 2019-10-24 VITALS
BODY MASS INDEX: 35.81 KG/M2 | WEIGHT: 255.8 LBS | TEMPERATURE: 98.1 F | DIASTOLIC BLOOD PRESSURE: 70 MMHG | SYSTOLIC BLOOD PRESSURE: 118 MMHG | HEART RATE: 84 BPM | RESPIRATION RATE: 16 BRPM | HEIGHT: 71 IN

## 2019-10-24 DIAGNOSIS — J01.00 ACUTE NON-RECURRENT MAXILLARY SINUSITIS: Primary | ICD-10-CM

## 2019-10-24 PROCEDURE — 3008F BODY MASS INDEX DOCD: CPT | Performed by: PHYSICIAN ASSISTANT

## 2019-10-24 PROCEDURE — 99213 OFFICE O/P EST LOW 20 MIN: CPT | Performed by: PHYSICIAN ASSISTANT

## 2019-10-24 RX ORDER — AMOXICILLIN 875 MG/1
875 TABLET, COATED ORAL 2 TIMES DAILY
Qty: 20 TABLET | Refills: 0 | Status: SHIPPED | OUTPATIENT
Start: 2019-10-24 | End: 2019-11-03

## 2019-10-24 NOTE — PROGRESS NOTES
Assessment/Plan:    1  Acute non-recurrent maxillary sinusitis    - start amoxil 1 tab twice daily for 10 days, mucinex, fluids, rest  - amoxicillin (AMOXIL) 875 mg tablet; Take 1 tablet (875 mg total) by mouth 2 (two) times a day for 10 days  Dispense: 20 tablet; Refill: 0    F/u as needed    Subjective:   Chief Complaint   Patient presents with    Cold Like Symptoms     Times 10 days       Patient ID: Farhat Buck is a 62 y o  male  10-12 days of sinus congestion, yellow mucus, sore throat that comes and goes, ear pressure, cough productive mucus from post nasal drip  Feverish and chills and fatigue  Took no OTC  The following portions of the patient's history were reviewed and updated as appropriate: allergies, current medications, past family history, past medical history, past social history, past surgical history and problem list     History reviewed  No pertinent past medical history    Past Surgical History:   Procedure Laterality Date    ANGIOPLASTY      ANKLE FUSION      CARPAL TUNNEL RELEASE      MENISCECTOMY Bilateral     NJ COLONOSCOPY FLX DX W/COLLJ SPEC WHEN PFRMD N/A 4/24/2019    Procedure: COLONOSCOPY with polypectomies;  Surgeon: Joe Tejada DO;  Location:  MAIN OR;  Service: Gastroenterology    ROTATOR CUFF REPAIR Right     TONSILLECTOMY       Family History   Problem Relation Age of Onset   Karina Johnston Breast cancer Mother     COPD Father     Alcohol abuse Neg Hx     Substance Abuse Neg Hx     Mental illness Neg Hx      Social History     Socioeconomic History    Marital status: /Civil Union     Spouse name: Not on file    Number of children: Not on file    Years of education: Not on file    Highest education level: Not on file   Occupational History    Not on file   Social Needs    Financial resource strain: Not on file    Food insecurity:     Worry: Not on file     Inability: Not on file    Transportation needs:     Medical: Not on file     Non-medical: Not on file   Tobacco Use    Smoking status: Former Smoker     Last attempt to quit: 2017     Years since quittin 4    Smokeless tobacco: Never Used   Substance and Sexual Activity    Alcohol use:  Yes     Alcohol/week: 1 0 standard drinks     Types: 1 Cans of beer per week     Frequency: Monthly or less     Drinks per session: 1 or 2    Drug use: Never    Sexual activity: Not on file   Lifestyle    Physical activity:     Days per week: Not on file     Minutes per session: Not on file    Stress: Not on file   Relationships    Social connections:     Talks on phone: Not on file     Gets together: Not on file     Attends [employer] service: Not on file     Active member of club or organization: Not on file     Attends meetings of clubs or organizations: Not on file     Relationship status: Not on file    Intimate partner violence:     Fear of current or ex partner: Not on file     Emotionally abused: Not on file     Physically abused: Not on file     Forced sexual activity: Not on file   Other Topics Concern    Not on file   Social History Narrative    Not on file       Current Outpatient Medications:     aspirin 81 mg CHEW, Chew 81 mg, Disp: , Rfl:     atorvastatin (LIPITOR) 40 mg tablet, Take 1 tablet (40 mg total) by mouth daily, Disp: 90 tablet, Rfl: 1    citalopram (CeleXA) 40 mg tablet, Take 1 tablet (40 mg total) by mouth daily, Disp: 90 tablet, Rfl: 1    lisinopril (ZESTRIL) 5 mg tablet, Take 1 tablet (5 mg total) by mouth daily, Disp: 90 tablet, Rfl: 1    ORACEA 40 MG capsule, Take 40 mg by mouth daily, Disp: , Rfl: 4    SOOLANTRA 1 % CREA, , Disp: , Rfl:     Vitamin D, Cholecalciferol, 1000 units TABS, Take 1 tablet (1,000 Units total) by mouth daily, Disp: 90 tablet, Rfl: 1    Review of Systems          Objective:    Vitals:    10/24/19 1037   BP: 118/70   BP Location: Left arm   Patient Position: Sitting   Cuff Size: Standard   Pulse: 84   Resp: 16   Temp: 98 1 °F (36 7 °C)   Weight: 116 kg (255 lb 12 8 oz)   Height: 5' 10 75" (1 797 m)        Physical Exam   Constitutional: He is oriented to person, place, and time  He appears well-developed and well-nourished  HENT:   Head: Normocephalic and atraumatic  Right Ear: Tympanic membrane, external ear and ear canal normal    Left Ear: Tympanic membrane, external ear and ear canal normal    Nose: Mucosal edema and rhinorrhea present  Mouth/Throat: Oropharynx is clear and moist  No oropharyngeal exudate or posterior oropharyngeal erythema  Cardiovascular: Normal rate, regular rhythm and normal heart sounds  Pulmonary/Chest: Effort normal and breath sounds normal    Lymphadenopathy:     He has no cervical adenopathy  Neurological: He is alert and oriented to person, place, and time  Skin: Skin is warm  Psychiatric: He has a normal mood and affect   Judgment normal

## 2019-10-28 ENCOUNTER — OFFICE VISIT (OUTPATIENT)
Dept: PHYSICAL THERAPY | Facility: CLINIC | Age: 59
End: 2019-10-28
Payer: COMMERCIAL

## 2019-10-28 DIAGNOSIS — M75.81 TENDINITIS OF RIGHT ROTATOR CUFF: ICD-10-CM

## 2019-10-28 DIAGNOSIS — M75.51 SUBACROMIAL BURSITIS OF RIGHT SHOULDER JOINT: ICD-10-CM

## 2019-10-28 DIAGNOSIS — M25.511 ACUTE PAIN OF RIGHT SHOULDER: Primary | ICD-10-CM

## 2019-10-28 PROCEDURE — 97110 THERAPEUTIC EXERCISES: CPT | Performed by: PHYSICAL THERAPIST

## 2019-10-28 PROCEDURE — 97140 MANUAL THERAPY 1/> REGIONS: CPT | Performed by: PHYSICAL THERAPIST

## 2019-10-28 NOTE — PROGRESS NOTES
Daily Note     Today's date: 10/28/2019  Patient name: Kylie Trejo  : 1960  MRN: [id number]  Referring provider: Cherry Colby MD  Dx:   Encounter Diagnosis     ICD-10-CM    1  Acute pain of right shoulder M25 511    2  Subacromial bursitis of right shoulder joint M75 51    3  Tendinitis of right rotator cuff M75 81                   Subjective: "I was fine after my evaluation  The shot really made it feel better for 2 days "      Objective: See treatment diary below      Assessment: Pt reported soreness post session  Initially felt impingement symptoms with pulleys that diminished after repeated reps  Plan: Continue per plan of care  Precautions: none         Manual  10/21 10/28           Post/inf glides mid/end range 10' MD           SL scap mobs 5'            PROM into IR  3'                                         Exercise Diary  10/21 10/28           UBE (retro)  6'           Pulleys (flex/ab)  10x10" flex           IR stretch with strap  3x30"           Doorway stretch  3x30"           Post capsule stretch  3x30"           Posture tband  GTB x15ea           Cane AAROM ext  10x10"           Supine serratus punch  x20           Supine flexion  x20           SL abduction  x20           3 way arm lift  x15 ea           ER/IR tband             H ab with tband             Diagonals with tband             Prone flex/h ab/ext

## 2019-11-01 ENCOUNTER — OFFICE VISIT (OUTPATIENT)
Dept: PHYSICAL THERAPY | Facility: CLINIC | Age: 59
End: 2019-11-01
Payer: COMMERCIAL

## 2019-11-01 DIAGNOSIS — M75.81 TENDINITIS OF RIGHT ROTATOR CUFF: ICD-10-CM

## 2019-11-01 DIAGNOSIS — M25.511 ACUTE PAIN OF RIGHT SHOULDER: Primary | ICD-10-CM

## 2019-11-01 DIAGNOSIS — M75.51 SUBACROMIAL BURSITIS OF RIGHT SHOULDER JOINT: ICD-10-CM

## 2019-11-01 PROCEDURE — 97140 MANUAL THERAPY 1/> REGIONS: CPT

## 2019-11-01 PROCEDURE — 97110 THERAPEUTIC EXERCISES: CPT

## 2019-11-01 PROCEDURE — 97112 NEUROMUSCULAR REEDUCATION: CPT

## 2019-11-01 NOTE — PROGRESS NOTES
Daily Note     Today's date: 2019  Patient name: Julian Orlando  : 1960  MRN: [id number]  Referring provider: Sydni Trujillo MD  Dx:   Encounter Diagnosis     ICD-10-CM    1  Acute pain of right shoulder M25 511    2  Subacromial bursitis of right shoulder joint M75 51    3  Tendinitis of right rotator cuff M75 81         1:1 with PT MD 11:10- 11:15  1:1 with PTA CR 11:15- 11:50  1:1 with MD 11:50-11:55  CP 11:55-12:05  Subjective: Anterior shoulder pain since last session rated 5/10  Denies radicular symptoms  " It finally went away yesterday  " Patient also worked and did some heavy lifting which may have contributed  Objective: See treatment diary below      Assessment: Held 3 way arm lift in standing as this was extremely painful last session; continued in gravity eliminated positions  No other progressions today until response is assessed  Biceps discomfort reported throughout with extension combined with ER  Plan: Continue per plan of care  Precautions: none         Manual  10/21 10/28 11/1          Post/inf glides mid/end range 10' MD WINSLOW  4 mins          SL scap mobs 5'  NP          PROM into IR  3' CR  4 mins                                          Exercise Diary  10/21 10/28 11/1          UBE (retro)  6' 6 mins          Pulleys (flex/ab)  10x10" flex NP          IR stretch with strap  3x30" 30"x3          Doorway stretch  3x30" NP          Post capsule stretch  3x30" 30"x3          Posture tband  GTB x15ea GTB  3"x20          Cane AAROM ext  10x10" 10"x10          Supine serratus punch  x20 2x10          Supine flexion  x20 2x10          SL abduction  x20 2x10          3 way arm lift  x15 ea NV          ER/IR tband             H ab with tband             Diagonals with tband             Prone flex/h ab/ext                                                                                           CP post   10 mins

## 2019-11-04 ENCOUNTER — OFFICE VISIT (OUTPATIENT)
Dept: PHYSICAL THERAPY | Facility: CLINIC | Age: 59
End: 2019-11-04
Payer: COMMERCIAL

## 2019-11-04 DIAGNOSIS — M25.511 ACUTE PAIN OF RIGHT SHOULDER: Primary | ICD-10-CM

## 2019-11-04 DIAGNOSIS — M75.81 TENDINITIS OF RIGHT ROTATOR CUFF: ICD-10-CM

## 2019-11-04 DIAGNOSIS — M75.51 SUBACROMIAL BURSITIS OF RIGHT SHOULDER JOINT: ICD-10-CM

## 2019-11-04 PROCEDURE — 97112 NEUROMUSCULAR REEDUCATION: CPT

## 2019-11-04 PROCEDURE — 97140 MANUAL THERAPY 1/> REGIONS: CPT

## 2019-11-04 PROCEDURE — 97110 THERAPEUTIC EXERCISES: CPT

## 2019-11-04 NOTE — PROGRESS NOTES
Daily Note     Today's date: 2019  Patient name: Zelda Tubbs  : 1960  MRN: [id number]  Referring provider: Agustina Valencia MD  Dx:   Encounter Diagnosis     ICD-10-CM    1  Acute pain of right shoulder M25 511    2  Subacromial bursitis of right shoulder joint M75 51    3  Tendinitis of right rotator cuff M75 81        Start Time: 1020  Stop Time: 1105  Total time in clinic (min): 45 minutes    Subjective: Patient reports, "My shoulder really hurt last night  I want to do less today because I think PT is irritating it "    Objective: See treatment diary below  Pain S/L Abd at 120-150°  Assessment: Patient had full ROM with abd, flex and ER and IR at 90° abd  Minor discomfort at end PROM  Continue to hold 3 way AROM secondary to increase in pain with scaption and abd  Continue to work on gravity eliminated strengthening  Plan: Continue per plan of care  Precautions: none       Manual  10/21 10/28 11/1 11/4         Post/inf glides mid/end range 10' MD WINSLOW  4 mins          SL scap mobs 5'  NP          PROM into IR  3' CR  4 mins   JM  5 min                                     Exercise Diary  10/21 10/28 11/1 114         UBE (retro)  6' 6 mins 3 min         Pulleys (flex/ab)  10x10" flex NP 10"  x10         IR stretch with strap  3x30" 30"x3          Doorway stretch  3x30" NP 20"x4         Post capsule stretch  3x30" 30"x3          Posture tband  GTB x15ea GTB  3"x20 GTB  3"x20         Cane AAROM ext  10x10" 10"x10          Supine serratus punch  x20 2x10 2x10         Supine flexion  x20 2x10 2x10         SL abduction  x20 2x10 2x10         3 way arm lift  x15 ea NV x10 ea         ER/IR tband             H ab with tband             Diagonals with tband             Prone flex/h ab/ext                                                                                          CP post   10 mins 10 mins

## 2019-11-08 ENCOUNTER — OFFICE VISIT (OUTPATIENT)
Dept: PHYSICAL THERAPY | Facility: CLINIC | Age: 59
End: 2019-11-08
Payer: COMMERCIAL

## 2019-11-08 DIAGNOSIS — M75.81 TENDINITIS OF RIGHT ROTATOR CUFF: ICD-10-CM

## 2019-11-08 DIAGNOSIS — M75.51 SUBACROMIAL BURSITIS OF RIGHT SHOULDER JOINT: ICD-10-CM

## 2019-11-08 DIAGNOSIS — M25.511 ACUTE PAIN OF RIGHT SHOULDER: Primary | ICD-10-CM

## 2019-11-08 PROCEDURE — 97110 THERAPEUTIC EXERCISES: CPT | Performed by: PHYSICAL THERAPIST

## 2019-11-08 PROCEDURE — 97014 ELECTRIC STIMULATION THERAPY: CPT | Performed by: PHYSICAL THERAPIST

## 2019-11-08 PROCEDURE — 97140 MANUAL THERAPY 1/> REGIONS: CPT | Performed by: PHYSICAL THERAPIST

## 2019-11-08 NOTE — PROGRESS NOTES
Daily Note     Today's date: 2019  Patient name: Jeison Larry  : 1960  MRN: [id number]  Referring provider: Annita Bermudez MD  Dx:   Encounter Diagnosis     ICD-10-CM    1  Acute pain of right shoulder M25 511    2  Subacromial bursitis of right shoulder joint M75 51    3  Tendinitis of right rotator cuff M75 81        Start Time: 1030  Stop Time: 1115  Total time in clinic (min): 45 minutes    Subjective: patient reports no improvement in his symptoms  He mentions he is still really struggling to sleep and he does not think physical therapy has been helping, he feels as though he has been getting worse  Objective: See treatment diary below      Assessment: patient tolerated treatment well  Held on therex today secondary to patient frustration and pain levels  trialed TENs with MHP with decreased pain levels post treatment  He will benefit from a re-evaluation next visit  Plan: Continue per plan of care  Re-Eval NV  Precautions: none       Manual  10/21 10/28 11/1 11/4 11/8        Post/inf glides mid/end range 10' MD WINSLOW  4 mins  SK        SL scap mobs 5'  NP          PROM into IR  3' CR  4 mins   JM  5 min SK PROM all directions                                    Exercise Diary  10/21 10/28 11/1 11/4 11/8        UBE (retro)  6' 6 mins 3 min 6'        Pulleys (flex/ab)  10x10" flex NP 10"  x10 10x10"        IR stretch with strap  3x30" 30"x3          Doorway stretch  3x30" NP 20"x4 20"x4        Post capsule stretch  3x30" 30"x3          Posture tband  GTB x15ea GTB  3"x20 GTB  3"x20         Cane AAROM ext  10x10" 10"x10          Supine serratus punch  x20 2x10 2x10         Supine flexion  x20 2x10 2x10         SL abduction  x20 2x10 2x10         3 way arm lift  x15 ea NV x10 ea         ER/IR tband             H ab with tband             Diagonals with tband             Prone flex/h ab/ext                                                                                  CP post   10 mins 10 mins         TENs     10'        MHP     10'

## 2019-11-08 NOTE — PROGRESS NOTES
Daily Note     Today's date: 2019  Patient name: Kellie Dumas  : 1960  MRN: [id number]  Referring provider: Cecilia Deras MD  Dx: No diagnosis found  Subjective: patient reports no improvement in his symptoms  Objective: See treatment diary below      Assessment: patient tolerated treatment well  Held on therex today secondary to patient frustration and pain levels  trialed TENs with MHP with decreased pain levels post treatment  Plan: Continue per plan of care  Re-Eval NV  Precautions: none       Manual  10/21 10/28 11/1 11/4 11/8        Post/inf glides mid/end range 10' MD WINSLOW  4 mins  SK        SL scap mobs 5'  NP          PROM into IR  3' CR  4 mins   JM  5 min SK PROM all directions                                    Exercise Diary  10/21 10/28 11/1 11/4 11/8        UBE (retro)  6' 6 mins 3 min 6'        Pulleys (flex/ab)  10x10" flex NP 10"  x10 10x10"        IR stretch with strap  3x30" 30"x3          Doorway stretch  3x30" NP 20"x4 20"x4        Post capsule stretch  3x30" 30"x3          Posture tband  GTB x15ea GTB  3"x20 GTB  3"x20         Cane AAROM ext  10x10" 10"x10          Supine serratus punch  x20 2x10 2x10         Supine flexion  x20 2x10 2x10         SL abduction  x20 2x10 2x10         3 way arm lift  x15 ea NV x10 ea         ER/IR tband             H ab with tband             Diagonals with tband             Prone flex/h ab/ext                                                                                  11        CP post   10 mins 10 mins         TENs     10'        MHP     10'

## 2019-11-11 ENCOUNTER — EVALUATION (OUTPATIENT)
Dept: PHYSICAL THERAPY | Facility: CLINIC | Age: 59
End: 2019-11-11
Payer: COMMERCIAL

## 2019-11-11 ENCOUNTER — APPOINTMENT (OUTPATIENT)
Dept: PHYSICAL THERAPY | Facility: CLINIC | Age: 59
End: 2019-11-11
Payer: COMMERCIAL

## 2019-11-11 DIAGNOSIS — M25.511 ACUTE PAIN OF RIGHT SHOULDER: Primary | ICD-10-CM

## 2019-11-11 PROCEDURE — 97140 MANUAL THERAPY 1/> REGIONS: CPT | Performed by: PHYSICAL THERAPIST

## 2019-11-11 PROCEDURE — 97110 THERAPEUTIC EXERCISES: CPT | Performed by: PHYSICAL THERAPIST

## 2019-11-11 NOTE — PROGRESS NOTES
Daily Note/Re-evaluation    Today's date: 2019  Patient name: Smitty Alpers  : 1960  MRN: [id number]  Referring provider: Shu Theodore MD  Dx:   Encounter Diagnosis     ICD-10-CM    1  Acute pain of right shoulder M25 511                   Assessment/Plan  Pt reports an increase in overall pain levels since beginning PT  Pt has made no substantial changes in R UE strength and ROM  Pt reports continued difficulty with reaching above head, behind back, and has interrupted sleep  Pt to return to the Dr at this time  No further skilled PT required  Plan  No further skilled PT required at this time  Goals  Short Term--4 weeks  1  Intermittent pain with 2 point decrease in pain at its worst - not met  2  1/2 point increase in MMT scores  - not met  3  Shoulder AROM R within 5 deg of L   -not met  4  No positive special testing of shoulder - not met  Long Term--By Discharge  1  Patient will achieve expected FOTO score  - not met  2  Patient will report no sleep disturbance secondary to R shoulder - not met  3  Patient will reach into high shelf without onset of shoulder pain  - not met    Patient's Goal: Patient will take out milk from refrigerator without onset of shoulder pain  Subjective     Current pain level: 2/10  Pain at it's best: 0/10  Pain at it's worst: 8/10        Objective: See treatment diary below            MMT          AROM         PROM   Shoulder       R       L        R        R   Flex  4 5 150 160   Extn  5 5 WNL  --   Abd  4- 5 142 162   IR  4 5  20 35 (90/90)   ER  4- 4+ WNL 90 (90/90)           Precautions: none       Manual  10/21 10/28 11/1 11/4 11/8 11/11       Post/inf glides mid/end range 10' MD WINSLOW  4 mins  SK MD       SL scap mobs 5'  NP          PROM into IR  3' CR  4 mins   JM  5 min SK PROM all directions                                    Exercise Diary  10/21 10/28 11/1 11/4 11/8 11/11       UBE (retro)  6' 6 mins 3 min 6'        Pulleys (flex/ab)  10x10" flex NP 10"  x10 10x10"        IR stretch with strap  3x30" 30"x3          Doorway stretch  3x30" NP 20"x4 20"x4 20"x4       Post capsule stretch  3x30" 30"x3          Posture tband  GTB x15ea GTB  3"x20 GTB  3"x20         Cane AAROM ext  10x10" 10"x10          Supine serratus punch  x20 2x10 2x10         Supine flexion  x20 2x10 2x10         SL abduction  x20 2x10 2x10         3 way arm lift  x15 ea NV x10 ea         ER/IR tband             H ab with tband             Diagonals with tband             Prone flex/h ab/ext                                                                                 11/1 11/4 11/8 11/11       CP post   10 mins 10 mins         TENs     10' 10'       MHP     10'

## 2019-11-13 DIAGNOSIS — I10 ESSENTIAL HYPERTENSION: ICD-10-CM

## 2019-11-13 DIAGNOSIS — I25.119 CORONARY ARTERY DISEASE WITH ANGINA PECTORIS, UNSPECIFIED VESSEL OR LESION TYPE, UNSPECIFIED WHETHER NATIVE OR TRANSPLANTED HEART (HCC): ICD-10-CM

## 2019-11-13 DIAGNOSIS — F41.8 DEPRESSION WITH ANXIETY: ICD-10-CM

## 2019-11-14 RX ORDER — CITALOPRAM 40 MG/1
TABLET ORAL
Qty: 90 TABLET | Refills: 1 | Status: SHIPPED | OUTPATIENT
Start: 2019-11-14 | End: 2020-05-04

## 2019-11-14 RX ORDER — LISINOPRIL 5 MG/1
TABLET ORAL
Qty: 90 TABLET | Refills: 1 | Status: SHIPPED | OUTPATIENT
Start: 2019-11-14 | End: 2020-05-04

## 2019-11-18 ENCOUNTER — OFFICE VISIT (OUTPATIENT)
Dept: OBGYN CLINIC | Facility: HOSPITAL | Age: 59
End: 2019-11-18
Payer: COMMERCIAL

## 2019-11-18 VITALS
DIASTOLIC BLOOD PRESSURE: 86 MMHG | HEART RATE: 97 BPM | BODY MASS INDEX: 35.84 KG/M2 | SYSTOLIC BLOOD PRESSURE: 117 MMHG | WEIGHT: 256 LBS | HEIGHT: 71 IN

## 2019-11-18 DIAGNOSIS — M24.811 INTERNAL DERANGEMENT OF RIGHT SHOULDER: Primary | ICD-10-CM

## 2019-11-18 PROCEDURE — 99214 OFFICE O/P EST MOD 30 MIN: CPT | Performed by: ORTHOPAEDIC SURGERY

## 2019-11-18 NOTE — PROGRESS NOTES
Assessment  Diagnoses and all orders for this visit:    Internal derangement of right shoulder        Discussion and Plan:    · Explained to the patient as he has failed conservative treatments up to this point, in the form of formal PT/HEP as well as a subacromial injection performed on 10/14/2019  The next step in the treatment process is to obtain an MRI of his right shoulder to evaluate internal structures for possible derangement, with focus on the rotator cuff musculature  He understood and was in agreement with this treatment plan  · May perform activities as tolerated  Avoid painful maneuvers  · He will follow up after MRI for discussion of results and further treatment options based on these results  Subjective:   Patient ID: Luther Reinoso is a 62 y o  male      HPI  55-year-old male presents to the office today for a follow-up visit for his right shoulder  Patient has been treated conservatively for suspected subacromial bursitis of the right shoulder  He was provided with a cortisone injection on 10/14/2019 as well as a referral to formal PT  Today, patient states that he continues to have symptoms about the right shoulder  He states that his symptoms are intermittent timing and worse with repetitive/overhead motions  He is localizing his symptoms about the anterior lateral aspect of the shoulder  He continues a home exercise program without benefit  He currently takes OTC anti-inflammatories p r n  for pain relief without benefit  Denies numbness and tingling, fever, chills  The following portions of the patient's history were reviewed and updated as appropriate: allergies, current medications, past family history, past medical history, past social history, past surgical history and problem list     Review of Systems   Constitutional: Negative for chills, fatigue, fever and unexpected weight change  HENT: Negative for hearing loss, nosebleeds and sore throat      Eyes: Negative for pain, redness and visual disturbance  Respiratory: Negative for cough, shortness of breath and wheezing  Cardiovascular: Negative for chest pain, palpitations and leg swelling  Gastrointestinal: Negative for abdominal pain, nausea and vomiting  Endocrine: Negative for polydipsia and polyuria  Genitourinary: Negative for frequency and urgency  Skin: Negative for color change, rash and wound  Neurological: Negative for dizziness, weakness, numbness and headaches  Psychiatric/Behavioral: Negative for behavioral problems, self-injury and suicidal ideas  Objective:  /86   Pulse 97   Ht 5' 10 75" (1 797 m)   Wt 116 kg (256 lb)   BMI 35 96 kg/m²       Right Shoulder Exam     Tenderness   The patient is experiencing no tenderness  Range of Motion   External rotation: 70   Forward flexion: 170   Internal rotation 0 degrees: Lumbar     Muscle Strength   Abduction: 4/5   External rotation: 4/5     Tests   Drop arm: positive    Other   Erythema: absent  Sensation: normal  Pulse: present            Physical Exam   Constitutional: He is oriented to person, place, and time  He appears well-developed and well-nourished  No distress  Eyes: Pupils are equal, round, and reactive to light  Conjunctivae are normal    Neck: Normal range of motion  Neck supple  Cardiovascular: Normal rate, regular rhythm and intact distal pulses  Pulmonary/Chest: Effort normal and breath sounds normal    Abdominal: Soft  Bowel sounds are normal    Neurological: He is alert and oriented to person, place, and time  He has normal reflexes  Skin: Skin is warm and dry  No rash noted  No erythema  Psychiatric: He has a normal mood and affect  His behavior is normal      I have personally reviewed the images in the PACS system and my interpretation is as follows:     Three views right shoulder obtained on September 30, 2019 show very mild degenerative changes glenohumeral joint, moderate degenerative change of the acromioclavicular joint with no evidence of proximal migration of the humerus      Scribe Attestation    I,:   Kacie Corrigan am acting as a scribe while in the presence of the attending physician :        I,:   Joshua Walker MD personally performed the services described in this documentation    as scribed in my presence :

## 2019-12-02 ENCOUNTER — TELEPHONE (OUTPATIENT)
Dept: OBGYN CLINIC | Facility: HOSPITAL | Age: 59
End: 2019-12-02

## 2019-12-02 NOTE — TELEPHONE ENCOUNTER
Caller: patient  Call back number: [phone]  Patient's doctor: Dr Francisco Javier Pelletier    Patient called stating his MRI requires authorization

## 2019-12-22 LAB
ALBUMIN SERPL-MCNC: 4.5 G/DL (ref 3.6–5.1)
ALBUMIN/GLOB SERPL: 1.8 (CALC) (ref 1–2.5)
ALP SERPL-CCNC: 65 U/L (ref 40–115)
ALT SERPL-CCNC: 23 U/L (ref 9–46)
AST SERPL-CCNC: 19 U/L (ref 10–35)
BILIRUB DIRECT SERPL-MCNC: 0.2 MG/DL
BILIRUB INDIRECT SERPL-MCNC: 0.7 MG/DL (CALC) (ref 0.2–1.2)
BILIRUB SERPL-MCNC: 0.9 MG/DL (ref 0.2–1.2)
CHOLEST SERPL-MCNC: 140 MG/DL
CHOLEST/HDLC SERPL: 3.4 (CALC)
GLOBULIN SER CALC-MCNC: 2.5 G/DL (CALC) (ref 1.9–3.7)
HDLC SERPL-MCNC: 41 MG/DL
LDLC SERPL CALC-MCNC: 73 MG/DL (CALC)
NONHDLC SERPL-MCNC: 99 MG/DL (CALC)
PROT SERPL-MCNC: 7 G/DL (ref 6.1–8.1)
TRIGL SERPL-MCNC: 192 MG/DL

## 2020-01-05 NOTE — PROGRESS NOTES
ASSESSMENT/PLAN:    Hyperlipidemia  Total cholesterol 140 from 172 with other meds  LDL 73 from 100 with other meds  Now at goal and we will continue atorvastatin    Fatigue/obstructive sleep apnea  Neck circumference 18 in  Patient already had the UPPP and does not want any other sleep apnea device at this time  He will in earnest try to lose weight by water walking elliptical bike riding next centrum without [place] his knee    Right knee pain  Patient will go to Ortho for an injection to see if it helps with the pain so he can exercise  He understands arthritis is not bad enough for replacement    Coronary artery disease/status post PTCA with stent 2015  Dr Shai Lemus Cardiology follows him from [facility]  Goal of LDL is 70 as above  Continue baby aspirin lisinopril  Start new statin as above      Vitamin-D deficiency  Continue vitamin-D 1000 units daily     Depression with anxiety  Continue citalopram  We will see if exercise helps with his depression     History of tobacco abuse  CT scan screen March 2019 no nodules  Recheck March 2020 him an order until next visit, we will wait to give     Adenoma is in the colon as well as serrated adenomas  Patient's next colonoscopy is April 2022  Kamran Saba    Patient will see Ortho  Patient will call Dr Santhosh Hawkins is office regarding next plan with his shoulder and trying to get MRI  Check patient in 6 months or sooner with screening blood work 1 week prior   [facility] MEDICINE - B sooner if needed     BMI Counseling: Body mass index is 36 7 kg/m²  The BMI is above normal  Nutrition recommendations include decreasing portion sizes and encouraging healthy choices of fruits and vegetables  Exercise recommendations include exercising 3-5 times per week                Health Maintenance   Topic Date Due    Hepatitis C Screening  1960    DTaP,Tdap,and Td Vaccines (1 - Tdap) 04/12/1969    HIV Screening  04/12/1973    PT PLAN OF CARE  12/11/2019    BMI: Followup Plan  09/30/2020    Depression Screening PHQ  10/21/2020    BMI: Adult  01/06/2021    Pneumococcal Vaccine: 65+ Years (1 of 2 - PCV13) 04/12/2023    CRC Screening: Colonoscopy  04/24/2029    Influenza Vaccine  Completed    Pneumococcal Vaccine: Pediatrics (0 to 5 Years) and At-Risk Patients (6 to 59 Years)  Aged Out    HIB Vaccine  Aged Out    Hepatitis B Vaccine  Aged Out    IPV Vaccine  Aged Out    Hepatitis A Vaccine  Aged Out    Meningococcal ACWY Vaccine  Aged Out    HPV Vaccine  Aged Out         Problem List as of 1/6/2020 Reviewed: 11/18/2019 10:47 AM by Yamel Doran MD    Coronary artery disease involving native coronary artery of native heart without angina pectoris    Essential hypertension    History of colonic polyps    Mixed hyperlipidemia    Morbid obesity ([address] )    Patellofemoral arthritis    Primary osteoarthritis of both knees    S/P PTCA (percutaneous transluminal coronary angioplasty)    Subacromial bursitis of right shoulder joint    Tendinitis of right rotator cuff            Subjective:   Chief Complaint   Patient presents with    Coronary Artery Disease    Hyperlipidemia    Hypertension     Patient is here for recheck  He did start his atorvastatin without any other problems and did stop his fenofibrate and pravastatin  He did get his blood work done next    He also saw Dr Ty Isaacs for shoulder problem and is x-rays look like AC joint arthritis and impingement  However the insurance company will not approve the MRI and patient is at a stable meat    He has a new problem and right knee pain especially when he walks  He could only walk about a mi than the pain is too great and he needs to walk for exercise  The pain does not wake him up from sleep her bother him when he sleeping or sitting it just kind of throbs    It is worse with walking going up and down the steps    Also patient's wife wants him to tell me that he is fatigued  He does have a history of sleep apnea and about 4-5 years ago had a UPPP  He feels he did gain weight since that time  patient ID: Karlos Alexandre is a 62 y o  male  No past medical history on file  Past Surgical History:   Procedure Laterality Date    ANGIOPLASTY      ANKLE FUSION      CARPAL TUNNEL RELEASE      MENISCECTOMY Bilateral     VA COLONOSCOPY FLX DX W/COLLJ SPEC WHEN PFRMD N/A 2019    Procedure: COLONOSCOPY with polypectomies;  Surgeon: Yumi Montano DO;  Location:  MAIN OR;  Service: Gastroenterology    ROTATOR CUFF REPAIR Right     TONSILLECTOMY       Family History   Problem Relation Age of Onset   [facility] Breast cancer Mother     COPD Father     Alcohol abuse Neg Hx     Substance Abuse Neg Hx     Mental illness Neg Hx      Social History     Tobacco Use    Smoking status: Former Smoker     Last attempt to quit: 2017     Years since quittin 6    Smokeless tobacco: Never Used   Substance Use Topics    Alcohol use:  Yes     Alcohol/week: 1 0 standard drinks     Types: 1 Cans of beer per week     Frequency: Monthly or less     Drinks per session: 1 or 2     Comment: Socially    Drug use: Never     Social History     Tobacco Use   Smoking Status Former Smoker    Last attempt to quit: 2017    Years since quittin 6   Smokeless Tobacco Never Used        MED LIST WAS REVIEWED AND UPDATED       ROS    Constitutional  No fever chills no fatigue no weight loss no weight gain    Mental status  No anxiety or depression and no sleep disturbances no changes in personality or emotional problems no suicidal or homicidal ideations    Eyes  No eye pain no red eyes no visual disturbances no discharge no eye itch    ENT  No earache no hearing loss nasal discharge no sore throat no hoarseness no postnasal drip     Cardio  No chest pain no palpitations no leg edema no claudication no dyspnea on exertion no nocturnal dyspnea    Respiratory  No shortness of breath or wheeze no cough no orthopnea no dyspnea on exertion no hemoptysis no sputum production    GI  No abdominal pain no nausea no vomiting no diarrhea or constipation no bloody stools no change in bowel habits no change in weight      no dysuria hematuria no pyuria no incontinence no pelvic pain    Musculoskeletal  No myalgias arthralgias no joint swelling or stiffness no limb pain or swelling    Skin  No rashes or lesions no itchiness and no wounds    Neuro  No headache dizziness lightheadedness syncope numbness paresthesias or confusion    Heme  No swollen glands no easy bruising            Objective:      VITALS:  Wt Readings from Last 3 Encounters:   01/06/20 119 kg (261 lb 4 8 oz)   11/18/19 116 kg (256 lb)   10/24/19 116 kg (255 lb 12 8 oz)     BP Readings from Last 3 Encounters:   01/06/20 132/84   11/18/19 117/86   10/24/19 118/70     Pulse Readings from Last 3 Encounters:   01/06/20 100   11/18/19 97   10/24/19 84     Body mass index is 36 7 kg/m²  Laboratory Results:    All pertinent labs and studies were reviewed with patient  during this office visit  including the following:    Lab Results   Component Value Date    WBC 13 12 (H) 01/07/2014    HGB 15 7 01/07/2014    HCT 46 1 01/07/2014    MCV 90 01/07/2014     01/07/2014     Lab Results   Component Value Date     01/07/2014    K 4 5 01/07/2014     01/07/2014    CO2 27 01/07/2014    BUN 13 01/07/2014     Lab Results   Component Value Date    GLUCOSE 100 01/07/2014    ALT 23 12/21/2019    AST 19 12/21/2019    ALKPHOS 65 12/21/2019    CALCIUM 9 3 01/07/2014     No results found for: QUSFPD0        Lipid Profile:   No results found for: CHOL  Lab Results   Component Value Date    HDL 41 12/21/2019     No results found for: [facility]  Lab Results   Component Value Date    TRIG 192 (H) 12/21/2019       Diabetic labs (if applicable)  Lab Results   Component Value Date    HGBA1C 5 4 02/18/2019    HGBA1C 5 2 10/25/2017     Lab Results   Component Value Date    CREATININE 0 74 01/07/2014 Physical Exam  General  Patient in no acute distress, well appearing, well nourished and appears stated age    Mental status  Quiet good eye contact monotone  Good judgment and insight, oriented to time person and place, recent and remote memory is intact, mood and affect are normal, cooperative, and patient is reasonable

## 2020-01-06 ENCOUNTER — OFFICE VISIT (OUTPATIENT)
Dept: FAMILY MEDICINE CLINIC | Facility: CLINIC | Age: 60
End: 2020-01-06
Payer: COMMERCIAL

## 2020-01-06 VITALS
HEART RATE: 100 BPM | HEIGHT: 71 IN | BODY MASS INDEX: 36.58 KG/M2 | SYSTOLIC BLOOD PRESSURE: 132 MMHG | WEIGHT: 261.3 LBS | RESPIRATION RATE: 16 BRPM | DIASTOLIC BLOOD PRESSURE: 84 MMHG

## 2020-01-06 DIAGNOSIS — G47.33 OBSTRUCTIVE SLEEP APNEA SYNDROME: ICD-10-CM

## 2020-01-06 DIAGNOSIS — E66.01 MORBID OBESITY (HCC): ICD-10-CM

## 2020-01-06 DIAGNOSIS — Z12.5 PROSTATE CANCER SCREENING: ICD-10-CM

## 2020-01-06 DIAGNOSIS — E78.2 MIXED HYPERLIPIDEMIA: ICD-10-CM

## 2020-01-06 DIAGNOSIS — I10 ESSENTIAL HYPERTENSION: ICD-10-CM

## 2020-01-06 DIAGNOSIS — I25.10 CORONARY ARTERY DISEASE INVOLVING NATIVE CORONARY ARTERY OF NATIVE HEART WITHOUT ANGINA PECTORIS: ICD-10-CM

## 2020-01-06 DIAGNOSIS — Z98.61 S/P PTCA (PERCUTANEOUS TRANSLUMINAL CORONARY ANGIOPLASTY): ICD-10-CM

## 2020-01-06 DIAGNOSIS — M17.0 PRIMARY OSTEOARTHRITIS OF BOTH KNEES: Primary | ICD-10-CM

## 2020-01-06 DIAGNOSIS — E55.9 VITAMIN D DEFICIENCY: ICD-10-CM

## 2020-01-06 PROCEDURE — 99215 OFFICE O/P EST HI 40 MIN: CPT | Performed by: FAMILY MEDICINE

## 2020-01-06 PROCEDURE — 3075F SYST BP GE 130 - 139MM HG: CPT | Performed by: FAMILY MEDICINE

## 2020-01-06 PROCEDURE — 3079F DIAST BP 80-89 MM HG: CPT | Performed by: FAMILY MEDICINE

## 2020-01-06 NOTE — TELEPHONE ENCOUNTER
Zari Hancock was not approved by his insurance company for his right shoulder  He would like to know what his next step should be  Please call him [phone]    Thank you

## 2020-01-06 NOTE — PATIENT INSTRUCTIONS
1  Please call Dr Shay Burciaga is office and ask them what the next plan is regarding the MRI  Ask him if you should call and fight your insurance as to her why you can have it when he needs surgery  Also ask if they will call and try to get it approved    2  Please see a different orthopedic doctor, Dr Samir Resendiz regarding her need for possible injection    3  Please consider working hard trying to lose weight so total help his sleep apnea, therefore your fatigue, also your knee      See you back in 6 months with fasting blood work and urine 1 week prior

## 2020-01-08 NOTE — TELEPHONE ENCOUNTER
Will reach out to the surgery scheduler at [facility] to see if this needs a peer to peer and have someone update pt

## 2020-01-09 NOTE — TELEPHONE ENCOUNTER
Alma from PCP office called to get an update on MRI status  I advised the office is working on it and will call pt  And give him an update

## 2020-01-09 NOTE — TELEPHONE ENCOUNTER
CALLED AND SPOKE TO PATIENT IN REGARDS OF HIS APPEAL PROCESS THEY HAVE 15 DAYS TO GIVE US AN ANSWER  ADVISED PATIENT TO CALL US BACK IF HE HASNT HEARD FROM US PAST THE 15 DAYS      THANKS

## 2020-01-09 NOTE — TELEPHONE ENCOUNTER
PCP office is calling in requesting to see the status of this patients MRI   Patient is calling in with increased shoulder pain trans caller to triage nurse to assist

## 2020-01-13 NOTE — TELEPHONE ENCOUNTER
Mr [facility] rep called on his behalf to follow up on status of MRI appeal   I told her that Gertrude Hein spoke to him on 1/9/20 as noted in task  The appeals dept can be reached through [phone]    Thank you

## 2020-01-13 NOTE — TELEPHONE ENCOUNTER
I can't get through because I don't have TAX ID number for office  Can someone reach out to them and ask them what we need to get this man the MRI we ordered weeks ago    thanks

## 2020-01-16 ENCOUNTER — TELEPHONE (OUTPATIENT)
Dept: OBGYN CLINIC | Facility: HOSPITAL | Age: 60
End: 2020-01-16

## 2020-01-16 NOTE — TELEPHONE ENCOUNTER
Chele and explained that it is still pending and that the insurance company stated that they have 15 calendar days to give us a response      Thanks

## 2020-01-16 NOTE — TELEPHONE ENCOUNTER
Patient sees Dr Ashley Seay  Pts wife June is calling to check status of MRI order  She is very upset that her 's MRI is still not approved and states this has been going on since October  She would like a callback today with an update        Catarino Cordova

## 2020-01-24 ENCOUNTER — HOSPITAL ENCOUNTER (OUTPATIENT)
Dept: RADIOLOGY | Facility: HOSPITAL | Age: 60
Discharge: HOME | End: 2020-01-24
Attending: ORTHOPAEDIC SURGERY
Payer: COMMERCIAL

## 2020-01-24 DIAGNOSIS — M24.811 INTERNAL DERANGEMENT OF RIGHT SHOULDER: ICD-10-CM

## 2020-01-24 PROCEDURE — 73221 MRI JOINT UPR EXTREM W/O DYE: CPT

## 2020-01-30 ENCOUNTER — ANESTHESIA EVENT (OUTPATIENT)
Dept: PERIOP | Facility: AMBULARY SURGERY CENTER | Age: 60
End: 2020-01-30
Payer: COMMERCIAL

## 2020-01-30 ENCOUNTER — OFFICE VISIT (OUTPATIENT)
Dept: OBGYN CLINIC | Facility: OTHER | Age: 60
End: 2020-01-30
Payer: COMMERCIAL

## 2020-01-30 VITALS
WEIGHT: 264 LBS | HEART RATE: 87 BPM | SYSTOLIC BLOOD PRESSURE: 126 MMHG | BODY MASS INDEX: 37.8 KG/M2 | HEIGHT: 70 IN | DIASTOLIC BLOOD PRESSURE: 80 MMHG

## 2020-01-30 DIAGNOSIS — M75.101 TEAR OF RIGHT SUPRASPINATUS TENDON: Primary | ICD-10-CM

## 2020-01-30 DIAGNOSIS — Z01.818 PRE-OP TESTING: Primary | ICD-10-CM

## 2020-01-30 PROCEDURE — 99214 OFFICE O/P EST MOD 30 MIN: CPT | Performed by: ORTHOPAEDIC SURGERY

## 2020-01-30 NOTE — PROGRESS NOTES
Assessment  Diagnoses and all orders for this visit:    Tear of right supraspinatus tendon          Discussion and Plan:    Due to failed conservative management we did discuss that surgical intervention is indicated at this time  We could consider arthroscopic rotator cuff repair of the right shoulder with debridement of the glenohumeral degenerative change or we can consider proceeding towards shoulder arthroplasty options  Given the patient age of 62 and the mild nature of the degenerative change I do not feel arthroplasty is indicated at this time and instead I have recommended arthroscopic evaluation with rotator cuff repair  He understands this will not help any of the arthritic symptoms but should help improve his function and pain  If were unsuccessful with that approach then arthroplasty can always be considered in the future  The patient voiced understanding of this and does wish to proceed forward with arthroscopic rotator cuff repair of his right shoulder  A thorough discussion was performed with the patient regarding the risks and benefit of operative and nonoperative treatment of their rotator cuff tear  Risks discussed include but were not limited to infection, neurovascular injury, recurrent tear, nonhealing of the repair, need for further surgery, need for biceps tenodesis or tenotomy, stiffness, need for prolonged rehabilitation, as well as the risk of anesthesia  After this discussion all questions were answered and informed consent was obtained in the office for arthroscopic rotator cuff repair of the right shoulder  Subjective:   Patient ID: Meme Hernandez is a 62 y o  male      HPI  Patient is a 62year old male who presents as a follow up for right shoulder internal derangement  Patient is here to review his MRI of his right shoulder today   Patient states that he has not improved in conservative management options such as a a formal course of PT with transition to a HEP  He has also tried a cortisone steroid injection into his right subacromial bursa on 10/14/2019 without relief of his symptoms  He has been taking NSAIDs without relief of his symptoms  He describes difficulties with doing overhead exercises and states that this is greatly affecting his quality of life  He denies numbness and tingling  The following portions of the patient's history were reviewed and updated as appropriate: allergies, current medications, past family history, past medical history, past social history, past surgical history and problem list     Review of Systems   Constitutional: Negative  HENT: Negative  Eyes: Negative  Respiratory: Negative  Cardiovascular: Negative  Gastrointestinal: Negative  Endocrine: Negative  Genitourinary: Negative  Musculoskeletal:        As noted in HPI   Skin: Negative  Allergic/Immunologic: Negative  Neurological: Negative  Hematological: Negative  Psychiatric/Behavioral: Negative  Objective:  /80   Pulse 87   Ht 5' 10" (1 778 m)   Wt 120 kg (264 lb)   BMI 37 88 kg/m²       Ortho Exam   Right Shoulder Exam  Alignment / Posture:  Normal shoulder posture  Inspection:  No swelling  Palpation:  global right shoulder tenderness  ROM:  Shoulder   Shoulder ER 70  Shoulder IR L2  Strength:  Supraspinatus 4/5  Infraspinatus 4/5  Stability:  No objective shoulder instability  Tests: (+) Voss  (+) Neer  (+) Drop arm  Neurovascular:  Sensation intact in Ax/R/M/U nerve distributions  2+ radial pulse  Physical Exam   Constitutional: He is oriented to person, place, and time  He appears well-developed and well-nourished  HENT:   Head: Normocephalic and atraumatic  Eyes: Pupils are equal, round, and reactive to light  Neck: Normal range of motion  Neck supple  Cardiovascular: Normal rate, regular rhythm and normal heart sounds     Pulmonary/Chest: Effort normal and breath sounds normal  No respiratory distress  Abdominal: Soft  He exhibits no distension  Neurological: He is alert and oriented to person, place, and time  Skin: Skin is warm and dry  Capillary refill takes less than 2 seconds  Psychiatric: He has a normal mood and affect  His behavior is normal    Nursing note and vitals reviewed  I have personally reviewed pertinent films in PACS and my interpretation is as follows  MRI of right shoulder on 1/24/2020 demonstrates a near full thickness supraspinatus tendon tear and intrasubstance subscapularis tendon tear with mild degenerative changes         Scribe Attestation    I,:   Mandie Mixon am acting as a scribe while in the presence of the attending physician :        I,:   Abhijit Mcfarland MD personally performed the services described in this documentation    as scribed in my presence :

## 2020-01-30 NOTE — PRE-PROCEDURE INSTRUCTIONS
Pre-Surgery Instructions:   Medication Instructions    aspirin 81 mg CHEW Patient was instructed by Physician and understands   atorvastatin (LIPITOR) 40 mg tablet Instructed patient per Anesthesia Guidelines   citalopram (CeleXA) 40 mg tablet Instructed patient per Anesthesia Guidelines   lisinopril (ZESTRIL) 5 mg tablet Instructed patient per Anesthesia Guidelines   SOOLANTRA 1 % CREA Patient was instructed by Physician and understands   Vitamin D, Cholecalciferol, 1000 units TABS Patient was instructed by Physician and understands  Pre op and bathing instructions reviewed  Pt will get hibiclens  Pt will bring sling DOS

## 2020-01-30 NOTE — PATIENT INSTRUCTIONS
You are being scheduled for a shoulder arthroscopy to treat your symptoms  Below are some instructions and information on what to expect before and after your surgery  Pre-Surgical Preparation for Arthroscopic Shoulder Surgery: You will be contacted the evening prior to your surgery to confirm the scheduled time of the procedure and when to arrive at the hospital    Do not eat or drink anything after midnight the night before your surgery  Since you are having out-patient surgery, make sure that you have someone who can drive you home later in the day  Also, prepare that person for a long day, as the process of safely preparing for and recovering from the procedure is more time consuming than the actual procedure! As you will be in a sling after surgery, please wear or bring a loose fitting button-down shirt so that you can easily place this over the sling when you leave the surgical suite  This avoids having to place the operative arm in a sleeve  Most patients find that this is the easiest outfit to wear for the first week or so after surgery so you may want to plan accordingly  Most patients find that lying down in bed after shoulder surgery accentuates their discomfort  This is likely related to the effect of gravity on the swelling in the shoulder  As a result, most patients sleep better in a recliner or in bed with pillows propped up behind their back for the first few days or weeks after surgery  It is a good idea to plan for this ahead of time so there will be less hassle getting things set up the night after surgery  What to Expect After Arthroscopic Shoulder Surgery: It is normal to have swelling and discomfort in the shoulder for several days or a week after surgery  It is also normal to have a small amount of drainage from the surgical wounds (especially the first few days after surgery), as we put fluid into the shoulder to visualize the structures during surgery  It is NOT normal to have foul smelling, purulent drainage and if this is noted, please contact the office immediately or proceed to the emergency room for evaluation as this may indicate an infection  Applying ice bags to the shoulder may help with pain that is not controlled by the regional block  Ice should be applied 20-30 minutes at a time, every hour or two  Make sure to put a thin towel or T-shirt next to your skin to avoid direct contact of the ice with the skin  Icing is most helpful in the first 48 hours, although many people find that continuing past this time frame lessens their postoperative pain  Please note that your post-operative dressing is not conductive to ice, so if you need to, it is okay to remove that dressing even the night after surgery and place band-aids over the wounds in order for the ice to take effect  Pain Control    Most patients will receive a nerve block, the local anesthetic may keep your whole arm numb for up to 4 days  You will be given a prescription for narcotic pain medication when you are discharged from the hospital   With the newer nerve block that is being utilized, patients are rarely requiring the use of this narcotic pain medication  If you find you do not tolerate that type of pain medicine well, call our office and we will try another one  In addition to the narcotic pain medication, it is safe to use an anti-inflammatory (unless the patient has a medical condition that would not allow safe use of this mediation)  This includes the Advil, Motrin, Ibuprofen and Alleve category of medications  Simply follow the over the counter dosing on the package and use as indicated as another adjunct  Importantly since these medications are all very similar, use only one of them  Tylenol is a separate medication that can be utilized as well and can be taken at the same time as the other medication or given in a "staggered" manner    Just make sure that you follow the dosing on the over the counter bottle instructions  Also make sure that the pain medication prescribed by Dr Natalia Watson team does not contain acetaminophen (this is found in Percocet and Vicodin)  Typically we do not prescribe those types of pain medications but if for some reason that has been prescribed DO NOT add more Tylenol (acetaminophen) as you could end up taking too much of that medication  As mentioned above, most patients find that lying down accentuates their discomfort  You might sleep better in a recliner, or propped up in bed  Dr Brigette Yates encourages patients to safely ambulate around the house as much as possible in the first few days after the procedure as this can help with blood circulation in the legs  While the incidence of blood clots is very rare following shoulder surgery, early ambulation is a great way to help decrease the already low rate  24 hours after the surgery you may remove the bandage and cover incisions with Band-Aids if needed  At that time you may shower, the wounds will have a surgical glue that will protect them from shower water but do not submerge your incisions directly (bathing or swimming) until at least 2 weeks post-operatively  It is safe to let the arm hang at the side and take a shower and put on a shirt without the sling on  Just make sure that you do not use the operative are to reach out and grab anything as that may damage the repair  When you are done showering and getting dressed please return the operative arm to the sling  Unless noted otherwise in your discharge paperwork, Dr Brigette Yates uses absorbable sutures so they do not need to be removed  Dr Deshaun Maciel physician assistant (PA) will see you in the office a few days after the procedure to review the intra-operative findings and to initiate physical therapy if appropriate    A post-operative appointment should have been scheduled for you already, but if for some reason this did not happen, please call the office to make one  Physical therapy is important after nearly all shoulder surgeries and a detailed rehabilitation plan based on the specific intra-operative findings and procedures will be provided to your therapist at the first post-operative office visit  Most patients have post-operative therapy appointments scheduled pre-operatively, but if you do not, that will be handled at the first post-operative office visit  Unless expressly directed otherwise it is safe to remove the sling even the first day after the surgery and let the arm hang by the side  This allows patients to shower and even put a shirt on (bad arm in the sleeve first)  It is also safe to flex and extend their wrist, hand and fingers as much as possible when the block wears off  These simple motions can serve to pump fluid out of the forearm and decrease swelling in the arm

## 2020-01-31 ENCOUNTER — TELEPHONE (OUTPATIENT)
Dept: FAMILY MEDICINE CLINIC | Facility: CLINIC | Age: 60
End: 2020-01-31

## 2020-01-31 ENCOUNTER — TRANSCRIBE ORDERS (OUTPATIENT)
Dept: URGENT CARE | Facility: CLINIC | Age: 60
End: 2020-01-31

## 2020-01-31 ENCOUNTER — CLINICAL SUPPORT (OUTPATIENT)
Dept: FAMILY MEDICINE CLINIC | Facility: CLINIC | Age: 60
End: 2020-01-31
Payer: COMMERCIAL

## 2020-01-31 ENCOUNTER — TELEPHONE (OUTPATIENT)
Dept: OBGYN CLINIC | Facility: HOSPITAL | Age: 60
End: 2020-01-31

## 2020-01-31 DIAGNOSIS — Z01.818 PRE-OP TESTING: Primary | ICD-10-CM

## 2020-01-31 PROCEDURE — 93000 ELECTROCARDIOGRAM COMPLETE: CPT

## 2020-01-31 NOTE — TELEPHONE ENCOUNTER
Patient sees Dr Santhosh Mora at the PCP office is calling in wanting to know if this patient is required to have a preop physical before surgery or just an EKG? She is asking if someone can contact her back relating this,surgery is scheduled for 2/7    Call back# [phone]

## 2020-01-31 NOTE — TELEPHONE ENCOUNTER
Patient came to the window asking for an ekg  He has surgery on feb 7 with dr Unruly Almodovar   Per Dr Gregory Posada office only an ekg is needed no a pre op clearance

## 2020-01-31 NOTE — TELEPHONE ENCOUNTER
Called Osmar Johnson and stated patient doesn't need a pre-op evaluation, just the EKG  She will schedule patient accordingly

## 2020-02-03 ENCOUNTER — EVALUATION (OUTPATIENT)
Dept: PHYSICAL THERAPY | Facility: CLINIC | Age: 60
End: 2020-02-03
Payer: COMMERCIAL

## 2020-02-03 DIAGNOSIS — M75.101 TEAR OF RIGHT SUPRASPINATUS TENDON: Primary | ICD-10-CM

## 2020-02-03 PROCEDURE — 97161 PT EVAL LOW COMPLEX 20 MIN: CPT | Performed by: PHYSICAL THERAPIST

## 2020-02-03 NOTE — PROGRESS NOTES
PT Evaluation     Today's date: 2/3/2020  Patient name: Dulce Sandoval  : 1960  MRN: [id number]  Referring provider: Jessie Finley MD  Dx:   Encounter Diagnosis     ICD-10-CM    1  Tear of right supraspinatus tendon M75 101 Ambulatory referral to Physical Therapy                  Assessment  Assessment details: Dulce Sandoval is a 62 y o  Male who presents for a pre-op appt for his R shoulder  Pt has a tear of R supraspinatus and will be undergoing surgery this Friday  Pt has failed all conservatives tx  Pt given HEP and will f/u with PT next week s/p RTC repair  Impairments: abnormal or restricted ROM, impaired physical strength, lacks appropriate home exercise program and pain with function  Functional limitations: interrupted sleep, pain with all movement  Symptom irritability: moderate  Goals  1  Pt will be independent with HEP upon undergoing surgery  Plan  Patient would benefit from: PT eval  Frequency: 1x week  Duration in visits: 1  Duration in weeks: 1  Treatment plan discussed with: patient        Subjective Evaluation    History of Present Illness  Onset date: chronic  Mechanism of injury: Pt to undergo a R rotator cuff repair on 20 performed by Dr Walter Stevenson  Pt presents for a pre-op evaluation            Recurrent probem    Quality of life: good    Pain  Current pain rating: 3  At best pain ratin  At worst pain ratin  Location: R shoulder  Quality: dull ache  Relieving factors: rest  Aggravating factors: overhead activity  Progression: worsening    Hand dominance: right      Diagnostic Tests  MRI studies: abnormal  Treatments  Previous treatment: injection treatment and physical therapy  Patient Goals  Patient goals for therapy: decreased pain          Objective     Active Range of Motion     Right Shoulder   Flexion: 155 degrees   Abduction: 150 degrees   External rotation 90°: 60 degrees  Internal rotation 90°: 70 degrees     Passive Range of Motion     Right Shoulder Flexion: 170 degrees   Abduction: 170 degrees   External rotation 90°: WFL  Internal rotation 90°: WFL    Strength/Myotome Testing     Right Shoulder     Planes of Motion   Flexion: 4-   Abduction: 4-   External rotation at 90°: 4-   Internal rotation at 90°: 4     General Comments:      Shoulder Comments   DASH score: 43 2             Precautions: none      Manual                                                                                   Exercise Diary

## 2020-02-06 NOTE — ANESTHESIA PREPROCEDURE EVALUATION
Review of Systems/Medical History  Patient summary reviewed  Chart reviewed  No history of anesthetic complications     Cardiovascular  Hyperlipidemia, Hypertension controlled, Past MI , CAD , History of percutaneous transluminal coronary angioplasty,    Pulmonary  Smoker ex-smoker  , Sleep apnea ,        GI/Hepatic  Negative GI/hepatic ROS          Negative  ROS        Endo/Other  Negative endo/other ROS   Obesity    GYN       Hematology  Negative hematology ROS      Musculoskeletal  Osteoarthritis,   Arthritis     Neurology  Negative neurology ROS      Psychology   Negative psychology ROS              Physical Exam    Airway    Mallampati score: II  TM Distance: >3 FB  Neck ROM: full     Dental       Cardiovascular      Pulmonary      Other Findings        Anesthesia Plan  ASA Score- 2     Anesthesia Type- general with ASA Monitors  Additional Monitors:   Airway Plan: LMA  Comment: IS Block for post op pain per surgeon request        Plan Factors-    Induction- intravenous  Postoperative Plan-     Informed Consent-   I personally reviewed this patient with the CRNA  Discussed and agreed on the Anesthesia Plan with the CRNA  Brendan Son

## 2020-02-07 ENCOUNTER — ANESTHESIA (OUTPATIENT)
Dept: PERIOP | Facility: AMBULARY SURGERY CENTER | Age: 60
End: 2020-02-07
Payer: COMMERCIAL

## 2020-02-07 ENCOUNTER — HOSPITAL ENCOUNTER (OUTPATIENT)
Facility: AMBULARY SURGERY CENTER | Age: 60
Setting detail: OUTPATIENT SURGERY
Discharge: HOME | End: 2020-02-07
Attending: ORTHOPAEDIC SURGERY | Admitting: ORTHOPAEDIC SURGERY
Payer: COMMERCIAL

## 2020-02-07 VITALS
SYSTOLIC BLOOD PRESSURE: 116 MMHG | HEIGHT: 71 IN | HEART RATE: 86 BPM | RESPIRATION RATE: 18 BRPM | DIASTOLIC BLOOD PRESSURE: 68 MMHG | BODY MASS INDEX: 36.18 KG/M2 | TEMPERATURE: 97.7 F | OXYGEN SATURATION: 93 % | WEIGHT: 258.4 LBS

## 2020-02-07 DIAGNOSIS — M75.101 TEAR OF RIGHT SUPRASPINATUS TENDON: Primary | ICD-10-CM

## 2020-02-07 PROCEDURE — C9290 INJ, BUPIVACAINE LIPOSOME: HCPCS | Performed by: ANESTHESIOLOGY

## 2020-02-07 PROCEDURE — 29826 SHO ARTHRS SRG DECOMPRESSION: CPT | Performed by: ORTHOPAEDIC SURGERY

## 2020-02-07 PROCEDURE — C1713 ANCHOR/SCREW BN/BN,TIS/BN: HCPCS | Performed by: ORTHOPAEDIC SURGERY

## 2020-02-07 PROCEDURE — 99024 POSTOP FOLLOW-UP VISIT: CPT | Performed by: ORTHOPAEDIC SURGERY

## 2020-02-07 PROCEDURE — 29828 SHO ARTHRS SRG BICP TENODSIS: CPT | Performed by: ORTHOPAEDIC SURGERY

## 2020-02-07 PROCEDURE — 29827 SHO ARTHRS SRG RT8TR CUF RPR: CPT | Performed by: PHYSICIAN ASSISTANT

## 2020-02-07 PROCEDURE — 29827 SHO ARTHRS SRG RT8TR CUF RPR: CPT | Performed by: ORTHOPAEDIC SURGERY

## 2020-02-07 PROCEDURE — 29826 SHO ARTHRS SRG DECOMPRESSION: CPT | Performed by: PHYSICIAN ASSISTANT

## 2020-02-07 PROCEDURE — 29828 SHO ARTHRS SRG BICP TENODSIS: CPT | Performed by: PHYSICIAN ASSISTANT

## 2020-02-07 DEVICE — DEPUY MITEK HEALIX ADVANCE 4.5 BR: Type: IMPLANTABLE DEVICE | Site: SHOULDER | Status: FUNCTIONAL

## 2020-02-07 DEVICE — ANCHOR HEALIX ADV BR 3 SUTURE W/ORTHOCORD 4.5MM: Type: IMPLANTABLE DEVICE | Site: SHOULDER | Status: FUNCTIONAL

## 2020-02-07 RX ORDER — ONDANSETRON 2 MG/ML
INJECTION INTRAMUSCULAR; INTRAVENOUS AS NEEDED
Status: DISCONTINUED | OUTPATIENT
Start: 2020-02-07 | End: 2020-02-07 | Stop reason: SURG

## 2020-02-07 RX ORDER — OXYCODONE HYDROCHLORIDE 5 MG/1
TABLET ORAL
Qty: 15 TABLET | Refills: 0 | Status: SHIPPED | OUTPATIENT
Start: 2020-02-07 | End: 2020-02-10

## 2020-02-07 RX ORDER — PROPOFOL 10 MG/ML
INJECTION, EMULSION INTRAVENOUS AS NEEDED
Status: DISCONTINUED | OUTPATIENT
Start: 2020-02-07 | End: 2020-02-07 | Stop reason: SURG

## 2020-02-07 RX ORDER — DEXAMETHASONE SODIUM PHOSPHATE 4 MG/ML
INJECTION, SOLUTION INTRA-ARTICULAR; INTRALESIONAL; INTRAMUSCULAR; INTRAVENOUS; SOFT TISSUE AS NEEDED
Status: DISCONTINUED | OUTPATIENT
Start: 2020-02-07 | End: 2020-02-07 | Stop reason: SURG

## 2020-02-07 RX ORDER — OXYCODONE HYDROCHLORIDE 5 MG/1
5 TABLET ORAL ONCE AS NEEDED
Status: DISCONTINUED | OUTPATIENT
Start: 2020-02-07 | End: 2020-02-07 | Stop reason: HOSPADM

## 2020-02-07 RX ORDER — LIDOCAINE HYDROCHLORIDE 10 MG/ML
INJECTION, SOLUTION EPIDURAL; INFILTRATION; INTRACAUDAL; PERINEURAL AS NEEDED
Status: DISCONTINUED | OUTPATIENT
Start: 2020-02-07 | End: 2020-02-07 | Stop reason: SURG

## 2020-02-07 RX ORDER — BUPIVACAINE HYDROCHLORIDE 5 MG/ML
INJECTION, SOLUTION EPIDURAL; INTRACAUDAL AS NEEDED
Status: DISCONTINUED | OUTPATIENT
Start: 2020-02-07 | End: 2020-02-07 | Stop reason: SURG

## 2020-02-07 RX ORDER — FENTANYL CITRATE/PF 50 MCG/ML
50 SYRINGE (ML) INJECTION
Status: DISCONTINUED | OUTPATIENT
Start: 2020-02-07 | End: 2020-02-07 | Stop reason: HOSPADM

## 2020-02-07 RX ORDER — SODIUM CHLORIDE, SODIUM LACTATE, POTASSIUM CHLORIDE, CALCIUM CHLORIDE 600; 310; 30; 20 MG/100ML; MG/100ML; MG/100ML; MG/100ML
125 INJECTION, SOLUTION INTRAVENOUS CONTINUOUS
Status: DISCONTINUED | OUTPATIENT
Start: 2020-02-07 | End: 2020-02-07 | Stop reason: HOSPADM

## 2020-02-07 RX ORDER — MIDAZOLAM HYDROCHLORIDE 2 MG/2ML
INJECTION, SOLUTION INTRAMUSCULAR; INTRAVENOUS AS NEEDED
Status: DISCONTINUED | OUTPATIENT
Start: 2020-02-07 | End: 2020-02-07 | Stop reason: SURG

## 2020-02-07 RX ORDER — CEFAZOLIN SODIUM 2 G/50ML
2000 SOLUTION INTRAVENOUS ONCE
Status: COMPLETED | OUTPATIENT
Start: 2020-02-07 | End: 2020-02-07

## 2020-02-07 RX ORDER — ONDANSETRON 2 MG/ML
4 INJECTION INTRAMUSCULAR; INTRAVENOUS ONCE AS NEEDED
Status: DISCONTINUED | OUTPATIENT
Start: 2020-02-07 | End: 2020-02-07 | Stop reason: HOSPADM

## 2020-02-07 RX ORDER — METOCLOPRAMIDE HYDROCHLORIDE 5 MG/ML
10 INJECTION INTRAMUSCULAR; INTRAVENOUS ONCE AS NEEDED
Status: DISCONTINUED | OUTPATIENT
Start: 2020-02-07 | End: 2020-02-07 | Stop reason: HOSPADM

## 2020-02-07 RX ORDER — ONDANSETRON 2 MG/ML
4 INJECTION INTRAMUSCULAR; INTRAVENOUS EVERY 6 HOURS PRN
Status: DISCONTINUED | OUTPATIENT
Start: 2020-02-07 | End: 2020-02-07 | Stop reason: HOSPADM

## 2020-02-07 RX ADMIN — LIDOCAINE HYDROCHLORIDE 50 MG: 10 INJECTION, SOLUTION EPIDURAL; INFILTRATION; INTRACAUDAL; PERINEURAL at 09:26

## 2020-02-07 RX ADMIN — CEFAZOLIN SODIUM 2000 MG: 2 SOLUTION INTRAVENOUS at 09:21

## 2020-02-07 RX ADMIN — SODIUM CHLORIDE, SODIUM LACTATE, POTASSIUM CHLORIDE, AND CALCIUM CHLORIDE: .6; .31; .03; .02 INJECTION, SOLUTION INTRAVENOUS at 09:21

## 2020-02-07 RX ADMIN — MIDAZOLAM HYDROCHLORIDE 2 MG: 1 INJECTION, SOLUTION INTRAMUSCULAR; INTRAVENOUS at 08:37

## 2020-02-07 RX ADMIN — ONDANSETRON 4 MG: 2 INJECTION INTRAMUSCULAR; INTRAVENOUS at 10:08

## 2020-02-07 RX ADMIN — BUPIVACAINE HYDROCHLORIDE 7.5 ML: 5 INJECTION, SOLUTION EPIDURAL; INTRACAUDAL at 08:40

## 2020-02-07 RX ADMIN — PHENYLEPHRINE HYDROCHLORIDE 100 MCG: 10 INJECTION INTRAVENOUS at 09:53

## 2020-02-07 RX ADMIN — DEXAMETHASONE SODIUM PHOSPHATE 4 MG: 4 INJECTION, SOLUTION INTRAMUSCULAR; INTRAVENOUS at 09:37

## 2020-02-07 RX ADMIN — PHENYLEPHRINE HYDROCHLORIDE 100 MCG: 10 INJECTION INTRAVENOUS at 10:12

## 2020-02-07 RX ADMIN — PROPOFOL 200 MG: 10 INJECTION, EMULSION INTRAVENOUS at 09:26

## 2020-02-07 RX ADMIN — BUPIVACAINE 20 ML: 13.3 INJECTION, SUSPENSION, LIPOSOMAL INFILTRATION at 08:40

## 2020-02-07 NOTE — ANESTHESIA POSTPROCEDURE EVALUATION
Post-Op Assessment Note    CV Status:  Stable  Pain Score: 0    Pain management: adequate     Mental Status:  Alert and awake   Hydration Status:  Euvolemic   PONV Controlled:  Controlled   Airway Patency:  Patent   Post Op Vitals Reviewed: Yes      Staff: CRNA           BP (P) 131/81 (02/07/20 1034)    Temp (!) (P) 97 4 °F (36 3 °C) (02/07/20 1034)    Pulse (P) 60 (02/07/20 1034)   Resp (P) 14 (02/07/20 1034)    SpO2 (P) 98 % (02/07/20 1034)

## 2020-02-07 NOTE — H&P
I identified and marked the patient in the pre-op holding area after confirming the surgical consent  No changes to medical health since the H&P was preformed  The patient's prescription history was queried in the [employer] sale to [facility] to ensure compliance with applicable state laws

## 2020-02-07 NOTE — DISCHARGE INSTRUCTIONS
You are being scheduled for a shoulder arthroscopy to treat your symptoms  Below are some instructions and information on what to expect before and after your surgery  Pre-Surgical Preparation for Arthroscopic Shoulder Surgery: You will be contacted the evening prior to your surgery to confirm the scheduled time of the procedure and when to arrive at the hospital    Do not eat or drink anything after midnight the night before your surgery  Since you are having out-patient surgery, make sure that you have someone who can drive you home later in the day  Also, prepare that person for a long day, as the process of safely preparing for and recovering from the procedure is more time consuming than the actual procedure! As you will be in a sling after surgery, please wear or bring a loose fitting button-down shirt so that you can easily place this over the sling when you leave the surgical suite  This avoids having to place the operative arm in a sleeve  Most patients find that this is the easiest outfit to wear for the first week or so after surgery so you may want to plan accordingly  Most patients find that lying down in bed after shoulder surgery accentuates their discomfort  This is likely related to the effect of gravity on the swelling in the shoulder  As a result, most patients sleep better in a recliner or in bed with pillows propped up behind their back for the first few days or weeks after surgery  It is a good idea to plan for this ahead of time so there will be less hassle getting things set up the night after surgery  What to Expect After Arthroscopic Shoulder Surgery: It is normal to have swelling and discomfort in the shoulder for several days or a week after surgery  It is also normal to have a small amount of drainage from the surgical wounds (especially the first few days after surgery), as we put fluid into the shoulder to visualize the structures during surgery  It is NOT normal to have foul smelling, purulent drainage and if this is noted, please contact the office immediately or proceed to the emergency room for evaluation as this may indicate an infection  Applying ice bags to the shoulder may help with pain that is not controlled by the regional block  Ice should be applied 20-30 minutes at a time, every hour or two  Make sure to put a thin towel or T-shirt next to your skin to avoid direct contact of the ice with the skin  Icing is most helpful in the first 48 hours, although many people find that continuing past this time frame lessens their postoperative pain  Please note that your post-operative dressing is not conductive to ice, so if you need to, it is okay to remove that dressing even the night after surgery and place band-aids over the wounds in order for the ice to take effect  Pain Control    Most patients will receive a nerve block, the local anesthetic may keep your whole arm numb for up to 4 days  You will be given a prescription for narcotic pain medication when you are discharged from the hospital   With the newer nerve block that is being utilized, patients are rarely requiring the use of this narcotic pain medication  If you find you do not tolerate that type of pain medicine well, call our office and we will try another one  In addition to the narcotic pain medication, it is safe to use an anti-inflammatory (unless the patient has a medical condition that would not allow safe use of this mediation)  This includes the Advil, Motrin, Ibuprofen and Alleve category of medications  Simply follow the over the counter dosing on the package and use as indicated as another adjunct  Importantly since these medications are all very similar, use only one of them  Tylenol is a separate medication that can be utilized as well and can be taken at the same time as the other medication or given in a "staggered" manner    Just make sure that you follow the dosing on the over the counter bottle instructions  Also make sure that the pain medication prescribed by Dr Joe Vaca team does not contain acetaminophen (this is found in Percocet and Vicodin)  Typically we do not prescribe those types of pain medications but if for some reason that has been prescribed DO NOT add more Tylenol (acetaminophen) as you could end up taking too much of that medication  As mentioned above, most patients find that lying down accentuates their discomfort  You might sleep better in a recliner, or propped up in bed  Dr Zoe Barkley encourages patients to safely ambulate around the house as much as possible in the first few days after the procedure as this can help with blood circulation in the legs  While the incidence of blood clots is very rare following shoulder surgery, early ambulation is a great way to help decrease the already low rate  24 hours after the surgery you may remove the bandage and cover incisions with Band-Aids if needed  At that time you may shower, the wounds will have a surgical glue that will protect them from shower water but do not submerge your incisions directly (bathing or swimming) until at least 2 weeks post-operatively  It is safe to let the arm hang at the side and take a shower and put on a shirt without the sling on  Just make sure that you do not use the operative are to reach out and grab anything as that may damage the repair  When you are done showering and getting dressed please return the operative arm to the sling  Unless noted otherwise in your discharge paperwork, Dr Zoe Barkley uses absorbable sutures so they do not need to be removed  Dr Osiris Chan physician assistant (PA) will see you in the office a few days after the procedure to review the intra-operative findings and to initiate physical therapy if appropriate    A post-operative appointment should have been scheduled for you already, but if for some reason this did not happen, please call the office to make one  Physical therapy is important after nearly all shoulder surgeries and a detailed rehabilitation plan based on the specific intra-operative findings and procedures will be provided to your therapist at the first post-operative office visit  Most patients have post-operative therapy appointments scheduled pre-operatively, but if you do not, that will be handled at the first post-operative office visit  Unless expressly directed otherwise it is safe to remove the sling even the first day after the surgery and let the arm hang by the side  This allows patients to shower and even put a shirt on (bad arm in the sleeve first)  It is also safe to flex and extend their wrist, hand and fingers as much as possible when the block wears off  These simple motions can serve to pump fluid out of the forearm and decrease swelling in the arm

## 2020-02-07 NOTE — OP NOTE
OPERATIVE REPORT  PATIENT NAME: Naomi Magaña    :  1960  MRN: [id number]  Pt Location: [facility]  OR ROOM 06    SURGERY DATE: 2020     SURGEON: Karrie Urbina MD     ASSISTANT: Florencio Deras PA-C     NOTE: Florencio Deras PA-C was present throughout the entire procedure and performed essential assistance with patient prepping, draping, positioning, suture management, wound closure, sterile dressing application and sling application, all under my direct supervision  NOTE: No qualified resident physician was available for assistance    PREOPERATIVE DIAGNOSIS:  Right Shoulder Supraspinatus Tear    POSTOPERATIVE DIAGNOSIS: Right Shoulder Supraspinatus Tear and High Grade Partial Long Head Biceps Tendon Tear    PROCEDURES: Surgical Arthroscopy Right Shoulder with Rotator Cuff Repair, Long Head Biceps Tenodesis and Subacromial Decompression    ANESTHESIA STAFF: Dmitiry Reinoso MD     ANESTHESIA TYPE: General LMA with ultrasound guided interscalene block (Exparel)  The interscalene block was provided by the anesthesia staff per my request for postoperative pain control and to decrease the use of postoperative narcotic medication for pain control  COMPLICATIONS: None    FINDINGS: Supraspinatus Tear and High Grade Partial Long Head Biceps Tendon Tear    SPECIMEN(S):  None    ESTIMATED BLOOD LOSS: Minimal    INDICATION:  Briefly, the patient is a 62 y o   male with right shoulder pain  MRI scan confirmed a near full thickness supraspinatus tear  The patient elected for arthroscopic treatment  Informed consent was obtained after a thorough discussion of the risks and benefits of the procedure, as well as alternatives to the procedure  OPERATIVE TECHNIQUE:  On the day of surgery, I identified the patients right shoulder and marked it with my initials  The patient was taken to the operating room where anesthesia was induced and 2 grams of IV Cefazolin were given   The patient was examined in the supine position and was found to have full range of motion of the right shoulder with no instability   The patient was then positioned in the [address] [place] [address] position  All bony prominences were padded  The shoulder was prepped and draped in normal sterile fashion  After a time-out for safety, a standard posterolateral arthroscopic portal was made  Glenohumeral evaluation revealed early degenerative changes humeral head and glenoid with no loose bodies and no full thickness cartilage loss  There was a full thickness supraspinatus tear as well as a high-grade near complete long-head biceps tendon tear  This was not expected based on preoperative imaging but was significant pathology that required treatment  A long-head biceps tenodesis was performed using a 4 5 mm Mitek Healix Advanced anchor placed in the superior aspect of lesser tuberosity  Looping whipstitch was then placed through around long-head biceps which was then tied down to the lesser tuberosity and released off the superior aspect of the glenoid  A debridement of the early degenerative changes of the glenohumeral joint was performed using a shaving device  The subscapularis and infraspinatus were intact  After the intra-articular work was completed, the scope was then placed in the subacromial space through the same portal where a thorough bursectomy was performed  The full thickness supraspinatus tear was found to measure 1 2 cm from anterior to posterior and was able to be easily reduced to the tuberosity  The tuberosity was prepared in routine fashion and a single row repair with one 4 5 mm triple loaded Mitek Healix Advanced anchor using 4 stiches through the tendon (2 simple stitches and 1 vertical mattress) was performed achieving anatomic reduction of the rotator cuff tendon to the tuberosity    The CA ligament was frayed so it was released off the anterolateral edge of acromion and a gentle acromioplasty was performed  The area was then irrigated  Scope was withdrawn  Wounds were closed with 4-0 Monocryl and Histoacryl  Sterile dressings and a sling with an abduction pillow was placed  The patient was awoken without complication and returned to the recovery room in good condition  We will see the patient back in the office next week to initiate therapy following standard rotator cuff repair rehabilitation protocol  At the end of procedure, the counts were correct       PATIENT DISPOSITION:  Stable to PACU      SIGNATURE: Camila Lua MD  DATE: February 7, 2020  TIME: 10:23 AM

## 2020-02-07 NOTE — ANESTHESIA PROCEDURE NOTES
Peripheral Block    Patient location during procedure: holding area  Start time: 2/7/2020 8:40 AM  Reason for block: at surgeon's request and post-op pain management  Staffing  Anesthesiologist: Federico Ramos MD  Performed: anesthesiologist   Preanesthetic Checklist  Completed: patient identified, site marked, surgical consent, pre-op evaluation, timeout performed, IV checked, risks and benefits discussed and monitors and equipment checked  Peripheral Block  Patient position: supine  Prep: ChloraPrep  Patient monitoring: continuous pulse ox and frequent blood pressure checks  Block type: interscalene  Laterality: right  Injection technique: single-shot  Procedures: ultrasound guided, Ultrasound guidance required for the procedure to increase accuracy and safety of medication placement and decrease risk of complications    Ultrasound permanent image saved  Needle  Needle type: Stimuplex   Needle gauge: 22 G  Needle length: 5 cm  Needle localization: ultrasound guidance  Test dose: negative  Assessment  Injection assessment: incremental injection and local visualized surrounding nerve on ultrasound  Paresthesia pain: none  Heart rate change: no  Slow fractionated injection: yes  Post-procedure:  site cleaned  patient tolerated the procedure well with no immediate complications

## 2020-02-10 ENCOUNTER — OFFICE VISIT (OUTPATIENT)
Dept: OBGYN CLINIC | Facility: HOSPITAL | Age: 60
End: 2020-02-10

## 2020-02-10 VITALS
WEIGHT: 262.35 LBS | HEART RATE: 99 BPM | HEIGHT: 71 IN | DIASTOLIC BLOOD PRESSURE: 84 MMHG | SYSTOLIC BLOOD PRESSURE: 121 MMHG | BODY MASS INDEX: 36.73 KG/M2

## 2020-02-10 DIAGNOSIS — Z98.890 S/P RIGHT ROTATOR CUFF REPAIR: Primary | ICD-10-CM

## 2020-02-10 PROCEDURE — 3079F DIAST BP 80-89 MM HG: CPT | Performed by: ORTHOPAEDIC SURGERY

## 2020-02-10 PROCEDURE — 99024 POSTOP FOLLOW-UP VISIT: CPT | Performed by: ORTHOPAEDIC SURGERY

## 2020-02-10 PROCEDURE — 3074F SYST BP LT 130 MM HG: CPT | Performed by: ORTHOPAEDIC SURGERY

## 2020-02-10 PROCEDURE — 3008F BODY MASS INDEX DOCD: CPT | Performed by: ORTHOPAEDIC SURGERY

## 2020-02-10 NOTE — PROGRESS NOTES
Surgery: 2/7/2020 SARS w/RCR, BT and SAD    S: Fernando Flores is doing well post-operatively  Denies issues  States he is very uncomfortable in the sling  Still little numbness in the index and thumb  /84   Pulse 99   Ht 5' 11" (1 803 m)   Wt 119 kg (262 lb 5 6 oz)   BMI 36 59 kg/m²     O: Right shoulder  incisions without erythema or drainage  Circumduction without pain  Good elbow, wrist and hand motion  NVI  SI     A/P: 3 days s/p arthroscopic rotator cuff repair, bicep tenodesis, subacromial decompression  1 Sling for 6 weeks  2  Therapy for right shoulder - follow standard rehab protocol  3  Follow up in 6 weeks  4  Pillow removed from sling for patient comfort  5   Intra-operative pictures reviewed

## 2020-02-12 ENCOUNTER — APPOINTMENT (OUTPATIENT)
Dept: PHYSICAL THERAPY | Facility: CLINIC | Age: 60
End: 2020-02-12
Payer: COMMERCIAL

## 2020-02-12 ENCOUNTER — OFFICE VISIT (OUTPATIENT)
Dept: PHYSICAL THERAPY | Facility: CLINIC | Age: 60
End: 2020-02-12
Payer: COMMERCIAL

## 2020-02-12 DIAGNOSIS — M75.101 TEAR OF RIGHT SUPRASPINATUS TENDON: Primary | ICD-10-CM

## 2020-02-12 PROCEDURE — 97164 PT RE-EVAL EST PLAN CARE: CPT | Performed by: PHYSICAL THERAPIST

## 2020-02-12 PROCEDURE — 97110 THERAPEUTIC EXERCISES: CPT | Performed by: PHYSICAL THERAPIST

## 2020-02-12 NOTE — PROGRESS NOTES
PT Re-Evaluation     Today's date: 2020  Patient name: Vance Trejo  : 1960  MRN: [id number]  Referring provider: Wade Cardona MD  Dx:   Encounter Diagnosis     ICD-10-CM    1  Tear of right supraspinatus tendon M75 101                   Assessment  Assessment details: Pt is a 61 yo male s/p R RTC repair with biceps tenodesis and subacromial decompression  He presents with limited ROM, decreased strength, pain, and the listed functional limitations  He is an excellent candidate for outpatient physical therapy to address the above impairments and optimize functional use of R arm within protocol limitations  Functional limitations: currently unable to use RUE for any functional activity, somewhat interrupted sleeping  Goals  12 weeks  1  Independent with HEP at discharge  2  Tolerate sleeping 6-8 hours uninterrupted by pain to allow resuming PLOF  3  Increase R shoulder strength to at least 4+/5 throughout to allow full functional use of RUE  4  Achieve full AROM R shoulder at all planes to allow full functional use of RUE  5  Tolerate use of RUE for all ADLs to allow return to PLOF  Plan  Plan details: Provided with and reviewed initial written HEP  Reviewed physical exam findings and plan of care  All questions answered to patient's satisfaction  Plan for PT 1x/week until week 6 when patient is allowed AA/AROM   Patient in agreement with this plan  Patient would benefit from: skilled physical therapy  Planned modality interventions: low level laser therapy and cryotherapy  Planned therapy interventions: manual therapy, neuromuscular re-education, patient education, therapeutic activities, therapeutic exercise and home exercise program  Frequency: 2x week  Duration in weeks: 12  Treatment plan discussed with: patient        Subjective Evaluation    History of Present Illness  Date of surgery: 2020  Mechanism of injury: Pt had surgery for R RTC repair with biceps tenodesis and subacromial decompression  He denies any complications and arrives for first post op appointment today  Pain  Current pain ratin  Location: R shld    Social Support    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[employer])  Treatments  Previous treatment: physical therapy  Patient Goals  Patient goals for therapy: increased strength, decreased pain, increased motion, independence with ADLs/IADLs, return to sport/leisure activities and return to work          Objective     Postural Observations  Seated posture: poor  Standing posture: poor        Observations     Additional Observation Details  No drainage or redness at incisions    Active Range of Motion     Additional Active Range of Motion Details  Not assessed due to surgical protocol    Passive Range of Motion     Right Shoulder   Flexion: 90 degrees   Abduction: 60 degrees   External rotation 0°: 30 degrees   Internal rotation 0°: Right shoulder passive internal rotation at 0 degrees: to body       Strength/Myotome Testing     Additional Strength Details  Not assessed due to surgical protocol                   Precautions: R RTC repair 20 PROGRESS PER PRTOCOL    Daily Treatment Diary       Manuals             PROM per protocol 15'                                                   Exercise Diary              Wrist AROM *demo            Towel sq *            Scap sq *            Bwd shld roll *            Neck AROM *            Pendulums NV                                                                                                                                                                                                                            Modalities             CP prn

## 2020-02-19 ENCOUNTER — OFFICE VISIT (OUTPATIENT)
Dept: PHYSICAL THERAPY | Facility: CLINIC | Age: 60
End: 2020-02-19
Payer: COMMERCIAL

## 2020-02-19 DIAGNOSIS — M75.101 TEAR OF RIGHT SUPRASPINATUS TENDON: Primary | ICD-10-CM

## 2020-02-19 PROCEDURE — 97140 MANUAL THERAPY 1/> REGIONS: CPT

## 2020-02-19 NOTE — PROGRESS NOTES
PT Re-Evaluation     Today's date: 2020  Patient name: Moreno Chao  : 1960  MRN: [id number]  Referring provider: Juma Wooten MD  Dx:   Encounter Diagnosis     ICD-10-CM    1  Tear of right supraspinatus tendon M75 101            Subjective: Pt reports he is "not really" having R shoulder pain  Notes he is able to sleep in bed, and sleeps fairly well  Objective:  Refer to exercise diary  Assessment: Added pendulum exercises as below, stressed rocking his body throughout exercise  PROM to R shoulder, per protocol, w/o complaint  Issued written instructions for pendulum exercises, reviewed same w/pt  Will monitor  Plan: Cont per POC                         Precautions: R RTC repair 20 PROGRESS PER PRTOCOL    Daily Treatment Diary   Manuals            PROM per protocol 15' 15'                                                  Exercise Diary              Wrist AROM *demo            Towel sq *            Scap sq *            Bwd shld roll *            Neck AROM *            Pendulums NV x20                                                                                                                                                                                                                           Modalities             CP prn

## 2020-02-26 ENCOUNTER — OFFICE VISIT (OUTPATIENT)
Dept: PHYSICAL THERAPY | Facility: CLINIC | Age: 60
End: 2020-02-26
Payer: COMMERCIAL

## 2020-02-26 DIAGNOSIS — M75.101 TEAR OF RIGHT SUPRASPINATUS TENDON: Primary | ICD-10-CM

## 2020-02-26 PROCEDURE — 97140 MANUAL THERAPY 1/> REGIONS: CPT

## 2020-02-26 NOTE — PROGRESS NOTES
PT Re-Evaluation     Today's date: 2020  Patient name: Naomi Magaña  : 1960  MRN: [id number]  Referring provider: Xochilt Blue MD  Dx:   Encounter Diagnosis     ICD-10-CM    1  Tear of right supraspinatus tendon M75 101            Subjective: Pt performed HEP prior to therapy  Cont to note his R shoulder is feeling good  Objective:  Refer to exercise diary  Assessment: Performed pendulum exercises as below, again stressed rocking his body throughout exercise  Comfortable during PROM to R shoulder, per protocol  Will monitor  Plan: Cont per POC                         Precautions: R RTC repair 20 PROGRESS PER PRTOCOL    Daily Treatment Diary   Manuals           PROM per protocol 15' 15' 15'                                                 Exercise Diary              Wrist AROM *demo  HEP          Towel sq *  HEP          Scap sq *  HEP          Bwd shld roll *  HEP          Neck AROM *  HEP          Pendulums NV x20 x20                                                                                                                                                                                                                          Modalities             CP prn

## 2020-03-02 RX ORDER — DOXYCYCLINE 40 MG/1
CAPSULE ORAL
COMMUNITY
Start: 2020-01-11 | End: 2020-03-16 | Stop reason: SDUPTHER

## 2020-03-03 ENCOUNTER — OFFICE VISIT (OUTPATIENT)
Dept: OBGYN CLINIC | Facility: HOSPITAL | Age: 60
End: 2020-03-03
Payer: COMMERCIAL

## 2020-03-03 VITALS
BODY MASS INDEX: 36.4 KG/M2 | HEIGHT: 71 IN | SYSTOLIC BLOOD PRESSURE: 119 MMHG | DIASTOLIC BLOOD PRESSURE: 81 MMHG | WEIGHT: 260 LBS | HEART RATE: 110 BPM

## 2020-03-03 DIAGNOSIS — M25.561 CHRONIC PAIN OF BOTH KNEES: Primary | ICD-10-CM

## 2020-03-03 DIAGNOSIS — M70.51 PES ANSERINUS BURSITIS OF BOTH KNEES: ICD-10-CM

## 2020-03-03 DIAGNOSIS — M17.0 PRIMARY OSTEOARTHRITIS OF BOTH KNEES: ICD-10-CM

## 2020-03-03 DIAGNOSIS — G89.29 CHRONIC PAIN OF BOTH KNEES: Primary | ICD-10-CM

## 2020-03-03 DIAGNOSIS — M70.52 PES ANSERINUS BURSITIS OF BOTH KNEES: ICD-10-CM

## 2020-03-03 DIAGNOSIS — M25.562 CHRONIC PAIN OF BOTH KNEES: Primary | ICD-10-CM

## 2020-03-03 PROCEDURE — 3008F BODY MASS INDEX DOCD: CPT | Performed by: ORTHOPAEDIC SURGERY

## 2020-03-03 PROCEDURE — 1036F TOBACCO NON-USER: CPT | Performed by: ORTHOPAEDIC SURGERY

## 2020-03-03 PROCEDURE — 20610 DRAIN/INJ JOINT/BURSA W/O US: CPT | Performed by: ORTHOPAEDIC SURGERY

## 2020-03-03 PROCEDURE — 3074F SYST BP LT 130 MM HG: CPT | Performed by: ORTHOPAEDIC SURGERY

## 2020-03-03 PROCEDURE — 99213 OFFICE O/P EST LOW 20 MIN: CPT | Performed by: ORTHOPAEDIC SURGERY

## 2020-03-03 PROCEDURE — 3079F DIAST BP 80-89 MM HG: CPT | Performed by: ORTHOPAEDIC SURGERY

## 2020-03-03 RX ORDER — BETAMETHASONE SODIUM PHOSPHATE AND BETAMETHASONE ACETATE 3; 3 MG/ML; MG/ML
12 INJECTION, SUSPENSION INTRA-ARTICULAR; INTRALESIONAL; INTRAMUSCULAR; SOFT TISSUE
Status: COMPLETED | OUTPATIENT
Start: 2020-03-03 | End: 2020-03-03

## 2020-03-03 RX ORDER — LIDOCAINE HYDROCHLORIDE 10 MG/ML
2 INJECTION, SOLUTION INFILTRATION; PERINEURAL
Status: COMPLETED | OUTPATIENT
Start: 2020-03-03 | End: 2020-03-03

## 2020-03-03 RX ORDER — BUPIVACAINE HYDROCHLORIDE 2.5 MG/ML
2 INJECTION, SOLUTION INFILTRATION; PERINEURAL
Status: COMPLETED | OUTPATIENT
Start: 2020-03-03 | End: 2020-03-03

## 2020-03-03 RX ADMIN — BETAMETHASONE SODIUM PHOSPHATE AND BETAMETHASONE ACETATE 12 MG: 3; 3 INJECTION, SUSPENSION INTRA-ARTICULAR; INTRALESIONAL; INTRAMUSCULAR; SOFT TISSUE at 10:06

## 2020-03-03 RX ADMIN — BUPIVACAINE HYDROCHLORIDE 2 ML: 2.5 INJECTION, SOLUTION INFILTRATION; PERINEURAL at 10:06

## 2020-03-03 RX ADMIN — LIDOCAINE HYDROCHLORIDE 2 ML: 10 INJECTION, SOLUTION INFILTRATION; PERINEURAL at 10:06

## 2020-03-03 NOTE — PROGRESS NOTES
Assessment:   Diagnosis ICD-10-CM Associated Orders   1  Chronic pain of both knees M25 561 Large joint arthrocentesis: bilateral knee    M25 562 Large joint arthrocentesis: bilateral anserine bursa    G89 29    2  Primary osteoarthritis of both knees M17 0 Ambulatory referral to Orthopedic Surgery     Large joint arthrocentesis: bilateral knee   3  Pes anserinus bursitis of both knees M70 51 Large joint arthrocentesis: bilateral anserine bursa    M70 52        Plan:  Previously taken diagnostics reviewed and physical exam performed  Diagnosis, treatment options and associated risks were discussed with the patient including no treatment, nonsurgical treatment and potential for surgical intervention  The patient was given the opportunity to ask questions regarding each  Patient was educated on maintaining a healthy lifestyle with proper weight management/loss and physician recommended home exercise program/routine for regular fitness  Patient was offered, accepted, performed cortisone injections x4  Both knee joints as well as both pes anserine bursa areas were injected successfully today  Patient tolerated all 4 injections well  Ice and post injection protocol advised  Weightbearing activities as tolerated  To do next visit:  Return for re-check on an as needed basis (PRN)  The above stated was discussed in layman's terms and the patient expressed understanding  All questions were answered to the patient's satisfaction  Scribe Attestation    I,:   Say Avery am acting as a scribe while in the presence of the attending physician :        I,:   Miquel Whitlock MD personally performed the services described in this documentation    as scribed in my presence :              Subjective:   Renuka Burkett is a 62 y o  male who presents surgical consultation regarding his bilateral knees due to pain at request of his primary care physician    Patient states he has had both knees scoped greater than 10 years ago by Dr Cathy Conn  One year ago he saw Dr Manuela Jones where he was educated on patellofemoral strengthening exercises  He presents today with increased medial knee pain with weight-bearing activities as well as pain inferior to the medial joint line mainly at night  He has stiffness when getting up from a seated position as well as stairs or inclines  He is currently undergoing a weight loss program and finds that his knees are limiting him from being overly active  He declines any mechanical symptoms  He is not a diabetic  He has plans to retire next month and potentially move to [facility] [place]  Used to do  and more recently consultant for a natural [address]         Review of systems negative unless otherwise specified in HPI    Past Medical History:   Diagnosis Date    Hyperlipidemia     Hypertension     Myocardial infarction ([facility] [address] )        Past Surgical History:   Procedure Laterality Date    ANGIOPLASTY      ANKLE FUSION      CARDIAC SURGERY  2015    stent    CARPAL TUNNEL RELEASE Bilateral     COLONOSCOPY      KNEE ARTHROSCOPY Bilateral     MENISCECTOMY Bilateral     MD COLONOSCOPY FLX DX W/COLLJ SPEC WHEN PFRMD N/A 2019    Procedure: COLONOSCOPY with polypectomies;  Surgeon: Kehinde Rabago DO;  [facility];  Service: Gastroenterology    MD SHLDR ARTHROSCOP,SURG,W/ROTAT CUFF REPR Right 2020    Procedure: SHOULDER ARTHROSCOPIC ROTATOR CUFF REPAIR; SAD, BICEPS TENODESIS;  Surgeon: Torsten Avalos MD;  [facility];  Service: Orthopedics    ROTATOR CUFF REPAIR Left     TONSILLECTOMY         Family History   Problem Relation Age of Onset   [facility] Breast cancer Mother     COPD Father     Alcohol abuse Neg Hx     Substance Abuse Neg Hx     Mental illness Neg Hx        Social History     Occupational History    Not on file   Tobacco Use    Smoking status: Former Smoker     Last attempt to quit: 2017     Years since quittin 8    Smokeless tobacco: Never Used   Substance and Sexual Activity    Alcohol use: Yes     Alcohol/week: 1 0 standard drinks     Types: 1 Cans of beer per week     Frequency: Monthly or less     Drinks per session: 1 or 2     Comment: Socially    Drug use: Never    Sexual activity: Yes     Partners: Female         Current Outpatient Medications:     aspirin 81 mg CHEW, Chew 81 mg, Disp: , Rfl:     atorvastatin (LIPITOR) 40 mg tablet, Take 1 tablet (40 mg total) by mouth daily (Patient taking differently: Take 40 mg by mouth daily at bedtime ), Disp: 90 tablet, Rfl: 1    citalopram (CeleXA) 40 mg tablet, TAKE 1 TABLET BY MOUTH  DAILY, Disp: 90 tablet, Rfl: 1    doxycycline (ORACEA) 40 MG capsule, , Disp: , Rfl:     lisinopril (ZESTRIL) 5 mg tablet, TAKE 1 TABLET BY MOUTH  DAILY, Disp: 90 tablet, Rfl: 1    SOOLANTRA 1 % CREA, , Disp: , Rfl:     Vitamin D, Cholecalciferol, 1000 units TABS, Take 1 tablet (1,000 Units total) by mouth daily, Disp: 90 tablet, Rfl: 1    No Known Allergies         Vitals:    03/03/20 0928   BP: 119/81   Pulse: (!) 110       Objective:                    Right Knee Exam     Muscle Strength   The patient has normal right knee strength  Tenderness   The patient is experiencing tenderness in the medial joint line and pes anserinus  Range of Motion   The patient has normal right knee ROM  Right knee flexion: Minimal crepitation  Tests   Chapis:  Medial - negative Lateral - negative  Varus: negative Valgus: negative    Other   Erythema: absent  Sensation: normal  Swelling: none  Effusion: no effusion present      Left Knee Exam     Muscle Strength   The patient has normal left knee strength  Tenderness   The patient is experiencing tenderness in the medial joint line and pes anserinus  Range of Motion   The patient has normal left knee ROM  Left knee flexion: Minimal crepitation       Tests   Chapis:  Medial - negative Lateral - negative  Varus: negative Valgus: negative    Other   Erythema: absent  Sensation: normal  Swelling: none  Effusion: no effusion present    Comments: Both knees are in very mild varus alignment with bony enlargement medially  Right Hip Exam     Tenderness   The patient is experiencing no tenderness  Range of Motion   The patient has normal right hip ROM  Muscle Strength   The patient has normal right hip strength  Left Hip Exam     Tenderness   The patient is experiencing no tenderness  Range of Motion   The patient has normal left hip ROM  Muscle Strength   The patient has normal left hip strength  Diagnostics, reviewed and taken today if performed as documented:    None performed  but reviewed    The attending physician has personally reviewed the pertinent films in PACS and interpretation is as follows:    Right and left knee x-rays taken 03/04/2019 were reviewed today and show:  Subtle medial joint space narrowing as well as mild patellofemoral degeneration otherwise well preserved joint spaces at both knees  Subchondral sclerosis and small osteophyte formation present        Procedures, if performed today:    Large joint arthrocentesis: bilateral knee  Date/Time: 3/3/2020 10:06 AM  Consent given by: patient  Site marked: site marked  Supporting Documentation  Indications: pain   Procedure Details  Location: knee - bilateral knee  Preparation: Patient was prepped and draped in the usual sterile fashion  Needle size: 22 G  Ultrasound guidance: no  Approach: anterolateral    Medications (Right): 2 mL bupivacaine 0 25 %; 2 mL lidocaine 1 %; 12 mg betamethasone acetate-betamethasone sodium phosphate 6 (3-3) mg/mLMedications (Left): 2 mL bupivacaine 0 25 %; 2 mL lidocaine 1 %; 12 mg betamethasone acetate-betamethasone sodium phosphate 6 (3-3) mg/mL   Patient tolerance: patient tolerated the procedure well with no immediate complications  Dressing:  Sterile dressing applied    Large joint arthrocentesis: bilateral anserine bursa  Date/Time: 3/3/2020 10:06 AM  Consent given by: patient  Site marked: site marked  Supporting Documentation  Indications: pain   Procedure Details  Location: knee - bilateral anserine bursa  Preparation: Patient was prepped and draped in the usual sterile fashion  Needle size: 22 G  Ultrasound guidance: no  Approach: medial    Medications (Right): 2 mL bupivacaine 0 25 %; 2 mL lidocaine 1 %; 12 mg betamethasone acetate-betamethasone sodium phosphate 6 (3-3) mg/mLMedications (Left): 2 mL bupivacaine 0 25 %; 2 mL lidocaine 1 %; 12 mg betamethasone acetate-betamethasone sodium phosphate 6 (3-3) mg/mL   Patient tolerance: patient tolerated the procedure well with no immediate complications  Dressing:  Sterile dressing applied                Portions of the record may have been created with voice recognition software  Occasional wrong word or "sound a like" substitutions may have occurred due to the inherent limitations of voice recognition software  Read the chart carefully and recognize, using context, where substitutions have occurred

## 2020-03-04 ENCOUNTER — OFFICE VISIT (OUTPATIENT)
Dept: PHYSICAL THERAPY | Facility: CLINIC | Age: 60
End: 2020-03-04
Payer: COMMERCIAL

## 2020-03-04 DIAGNOSIS — M75.101 TEAR OF RIGHT SUPRASPINATUS TENDON: Primary | ICD-10-CM

## 2020-03-04 PROCEDURE — 97140 MANUAL THERAPY 1/> REGIONS: CPT

## 2020-03-04 NOTE — PROGRESS NOTES
PT Re-Evaluation     Today's date: 3/4/2020  Patient name: Smitty Alpers  : 1960  MRN: [id number]  Referring provider: Shu Theodore MD  Dx:   Encounter Diagnosis     ICD-10-CM    1  Tear of right supraspinatus tendon M75 101            Subjective: Pt performed HEP early this morning  Notes his R shoulder is feeling good  Objective:  Refer to exercise diary  Assessment: Briefly reviewed HEP, performed pendulum exercises w/o complaint  Comfortable during PROM to R shoulder, per protocol  Will monitor  Plan: Cont per POC  Progress per protocol 3-7-2020                         Precautions: R RTC repair 20 PROGRESS PER PRTOCOL    Daily Treatment Diary   Manuals 2/12 2/19 2/26 3/4         PROM per protocol 15' 15' 15' 15'                                                Exercise Diary              Wrist AROM *demo  HEP Rev         Towel sq *  HEP HEP         Scap sq *  HEP x5 Rev         Bwd shld roll *  HEP x5Rev         Neck AROM *  HEP x3Rev         Pendulums NV x20 x20 x20                                                                                                                                                                                                                         Modalities             CP prn

## 2020-03-11 ENCOUNTER — OFFICE VISIT (OUTPATIENT)
Dept: PHYSICAL THERAPY | Facility: CLINIC | Age: 60
End: 2020-03-11
Payer: COMMERCIAL

## 2020-03-11 DIAGNOSIS — M75.101 TEAR OF RIGHT SUPRASPINATUS TENDON: Primary | ICD-10-CM

## 2020-03-11 PROCEDURE — 97140 MANUAL THERAPY 1/> REGIONS: CPT

## 2020-03-11 PROCEDURE — 97110 THERAPEUTIC EXERCISES: CPT

## 2020-03-11 NOTE — PROGRESS NOTES
Today's date: 3/11/2020  Patient name: Yanira Mullen  : 1960  MRN: [id number]  Referring provider: Danika Paris MD  Dx:   Encounter Diagnosis     ICD-10-CM    1  Tear of right supraspinatus tendon M75 101            Subjective: Pt to clinic w/o sling  Cont to report his R shoulder is feeling good  Objective:  Refer to exercise diary  Assessment: Progressed exercise program, as below, per protocol  Progressed PROM to R shoulder, per protocol  Comfortable throughout program  Good tolerance to tx  Issued updated written HEP instructions, reviewed same w/pt  Instructed pt in using ice at home if his shoulder becomes painful  Discussed need to be wearing his sling per protocol  Will monitor  Plan: Cont per POC  Progress per protocol 3-7-2020             Precautions: R RTC repair 20 PROGRESS PER PRTOCOL    Daily Treatment Diary   Manuals 2/12 2/19 2/26 3/4 3/11        PROM per protocol 15' 15' 15' 15' 15'                                               Exercise Diary              Wrist AROM *demo  HEP Rev HEP ----       Towel sq *  HEP HEP HEP ----       Scap sq *  HEP x5 Rev HEP ----       Bwd shld roll *  HEP x5Rev HEP ----       Neck AROM *  HEP x3Rev HEP ----       Pendulums NV x20 x20 x20 x20        Pulleys flx/abd     10x10"  ea        Table slides flx/abd     10x10"  ea        Wand ext/IR     10x10"  ea                                                                                                                                                                                 Modalities             CP prn

## 2020-03-16 ENCOUNTER — TELEPHONE (OUTPATIENT)
Dept: FAMILY MEDICINE CLINIC | Facility: CLINIC | Age: 60
End: 2020-03-16

## 2020-03-16 DIAGNOSIS — L71.9 ROSACEA: Primary | ICD-10-CM

## 2020-03-16 DIAGNOSIS — E78.2 MIXED HYPERLIPIDEMIA: ICD-10-CM

## 2020-03-16 RX ORDER — DOXYCYCLINE 40 MG/1
40 CAPSULE ORAL EVERY MORNING
Qty: 90 CAPSULE | Refills: 1 | Status: SHIPPED | OUTPATIENT
Start: 2020-03-16

## 2020-03-16 RX ORDER — ATORVASTATIN CALCIUM 40 MG/1
40 TABLET, FILM COATED ORAL
Qty: 90 TABLET | Refills: 1 | Status: SHIPPED | OUTPATIENT
Start: 2020-03-16 | End: 2020-09-13

## 2020-03-16 NOTE — TELEPHONE ENCOUNTER
Doxycycline and atorvastatin/ Lipitor has been sent   He will need to call his cardiologist for his nitro

## 2020-03-16 NOTE — TELEPHONE ENCOUNTER
Pt called requesting a refill on his   1 nitroglycerin  2  doxycycline  3  Lipitor ( no covered through insurance is there a substitute )    To be sent to [facility] pharm

## 2020-03-18 ENCOUNTER — APPOINTMENT (OUTPATIENT)
Dept: PHYSICAL THERAPY | Facility: CLINIC | Age: 60
End: 2020-03-18
Payer: COMMERCIAL

## 2020-03-20 ENCOUNTER — APPOINTMENT (OUTPATIENT)
Dept: PHYSICAL THERAPY | Facility: CLINIC | Age: 60
End: 2020-03-20
Payer: COMMERCIAL

## 2020-04-15 ENCOUNTER — TELEMEDICINE (OUTPATIENT)
Dept: PHYSICAL THERAPY | Facility: CLINIC | Age: 60
End: 2020-04-15
Payer: COMMERCIAL

## 2020-04-15 DIAGNOSIS — M75.101 TEAR OF RIGHT SUPRASPINATUS TENDON: Primary | ICD-10-CM

## 2020-04-15 PROCEDURE — 97110 THERAPEUTIC EXERCISES: CPT | Performed by: PHYSICAL THERAPIST

## 2020-04-22 ENCOUNTER — TELEMEDICINE (OUTPATIENT)
Dept: PHYSICAL THERAPY | Facility: CLINIC | Age: 60
End: 2020-04-22
Payer: COMMERCIAL

## 2020-04-22 DIAGNOSIS — M75.101 TEAR OF RIGHT SUPRASPINATUS TENDON: Primary | ICD-10-CM

## 2020-04-22 PROCEDURE — 97110 THERAPEUTIC EXERCISES: CPT | Performed by: PHYSICAL THERAPIST

## 2020-04-30 ENCOUNTER — TELEMEDICINE (OUTPATIENT)
Dept: PHYSICAL THERAPY | Facility: CLINIC | Age: 60
End: 2020-04-30
Payer: COMMERCIAL

## 2020-04-30 DIAGNOSIS — M75.101 TEAR OF RIGHT SUPRASPINATUS TENDON: Primary | ICD-10-CM

## 2020-04-30 PROCEDURE — 97110 THERAPEUTIC EXERCISES: CPT | Performed by: PHYSICAL THERAPIST

## 2020-05-01 DIAGNOSIS — I25.119 CORONARY ARTERY DISEASE WITH ANGINA PECTORIS, UNSPECIFIED VESSEL OR LESION TYPE, UNSPECIFIED WHETHER NATIVE OR TRANSPLANTED HEART (HCC): ICD-10-CM

## 2020-05-01 DIAGNOSIS — F41.8 DEPRESSION WITH ANXIETY: ICD-10-CM

## 2020-05-01 DIAGNOSIS — I10 ESSENTIAL HYPERTENSION: ICD-10-CM

## 2020-05-04 RX ORDER — CITALOPRAM 40 MG/1
TABLET ORAL
Qty: 90 TABLET | Refills: 0 | Status: SHIPPED | OUTPATIENT
Start: 2020-05-04 | End: 2020-07-27

## 2020-05-04 RX ORDER — LISINOPRIL 5 MG/1
TABLET ORAL
Qty: 90 TABLET | Refills: 0 | Status: SHIPPED | OUTPATIENT
Start: 2020-05-04 | End: 2020-07-27

## 2020-05-07 ENCOUNTER — TELEMEDICINE (OUTPATIENT)
Dept: PHYSICAL THERAPY | Facility: CLINIC | Age: 60
End: 2020-05-07
Payer: COMMERCIAL

## 2020-05-07 DIAGNOSIS — M75.101 TEAR OF RIGHT SUPRASPINATUS TENDON: Primary | ICD-10-CM

## 2020-05-07 PROCEDURE — 97110 THERAPEUTIC EXERCISES: CPT | Performed by: PHYSICAL THERAPIST

## 2020-05-21 ENCOUNTER — TELEMEDICINE (OUTPATIENT)
Dept: PHYSICAL THERAPY | Facility: CLINIC | Age: 60
End: 2020-05-21
Payer: COMMERCIAL

## 2020-05-21 DIAGNOSIS — M75.101 TEAR OF RIGHT SUPRASPINATUS TENDON: Primary | ICD-10-CM

## 2020-05-21 PROCEDURE — 97110 THERAPEUTIC EXERCISES: CPT | Performed by: PHYSICAL THERAPIST

## 2020-05-28 ENCOUNTER — TELEMEDICINE (OUTPATIENT)
Dept: PHYSICAL THERAPY | Facility: CLINIC | Age: 60
End: 2020-05-28
Payer: COMMERCIAL

## 2020-05-28 DIAGNOSIS — M75.101 TEAR OF RIGHT SUPRASPINATUS TENDON: Primary | ICD-10-CM

## 2020-05-28 PROCEDURE — 97110 THERAPEUTIC EXERCISES: CPT | Performed by: PHYSICAL THERAPIST

## 2020-06-03 ENCOUNTER — EVALUATION (OUTPATIENT)
Dept: PHYSICAL THERAPY | Facility: CLINIC | Age: 60
End: 2020-06-03
Payer: COMMERCIAL

## 2020-06-03 DIAGNOSIS — M75.101 TEAR OF RIGHT SUPRASPINATUS TENDON: Primary | ICD-10-CM

## 2020-06-03 PROCEDURE — 97110 THERAPEUTIC EXERCISES: CPT | Performed by: PHYSICAL THERAPIST

## 2020-06-10 ENCOUNTER — OFFICE VISIT (OUTPATIENT)
Dept: PHYSICAL THERAPY | Facility: CLINIC | Age: 60
End: 2020-06-10
Payer: COMMERCIAL

## 2020-06-10 DIAGNOSIS — M75.101 TEAR OF RIGHT SUPRASPINATUS TENDON: Primary | ICD-10-CM

## 2020-06-10 PROCEDURE — 97110 THERAPEUTIC EXERCISES: CPT | Performed by: PHYSICAL THERAPIST

## 2020-06-16 ENCOUNTER — OFFICE VISIT (OUTPATIENT)
Dept: PHYSICAL THERAPY | Facility: CLINIC | Age: 60
End: 2020-06-16
Payer: COMMERCIAL

## 2020-06-16 ENCOUNTER — APPOINTMENT (OUTPATIENT)
Dept: PHYSICAL THERAPY | Facility: CLINIC | Age: 60
End: 2020-06-16
Payer: COMMERCIAL

## 2020-06-16 DIAGNOSIS — M75.101 TEAR OF RIGHT SUPRASPINATUS TENDON: Primary | ICD-10-CM

## 2020-06-16 PROCEDURE — 97110 THERAPEUTIC EXERCISES: CPT | Performed by: PHYSICAL THERAPIST

## 2020-07-09 ENCOUNTER — OFFICE VISIT (OUTPATIENT)
Dept: OBGYN CLINIC | Facility: HOSPITAL | Age: 60
End: 2020-07-09
Payer: COMMERCIAL

## 2020-07-09 VITALS — WEIGHT: 255 LBS | HEIGHT: 71 IN | BODY MASS INDEX: 35.7 KG/M2

## 2020-07-09 DIAGNOSIS — M17.0 PRIMARY OSTEOARTHRITIS OF BOTH KNEES: Primary | ICD-10-CM

## 2020-07-09 PROCEDURE — 3008F BODY MASS INDEX DOCD: CPT | Performed by: ORTHOPAEDIC SURGERY

## 2020-07-09 PROCEDURE — 99213 OFFICE O/P EST LOW 20 MIN: CPT | Performed by: ORTHOPAEDIC SURGERY

## 2020-07-09 PROCEDURE — 3079F DIAST BP 80-89 MM HG: CPT | Performed by: ORTHOPAEDIC SURGERY

## 2020-07-09 PROCEDURE — 1036F TOBACCO NON-USER: CPT | Performed by: ORTHOPAEDIC SURGERY

## 2020-07-09 PROCEDURE — 20610 DRAIN/INJ JOINT/BURSA W/O US: CPT | Performed by: ORTHOPAEDIC SURGERY

## 2020-07-09 PROCEDURE — 3074F SYST BP LT 130 MM HG: CPT | Performed by: ORTHOPAEDIC SURGERY

## 2020-07-09 RX ORDER — BUPIVACAINE HYDROCHLORIDE 2.5 MG/ML
2 INJECTION, SOLUTION INFILTRATION; PERINEURAL
Status: COMPLETED | OUTPATIENT
Start: 2020-07-09 | End: 2020-07-09

## 2020-07-09 RX ORDER — BETAMETHASONE SODIUM PHOSPHATE AND BETAMETHASONE ACETATE 3; 3 MG/ML; MG/ML
12 INJECTION, SUSPENSION INTRA-ARTICULAR; INTRALESIONAL; INTRAMUSCULAR; SOFT TISSUE
Status: COMPLETED | OUTPATIENT
Start: 2020-07-09 | End: 2020-07-09

## 2020-07-09 RX ORDER — LIDOCAINE HYDROCHLORIDE 10 MG/ML
2 INJECTION, SOLUTION INFILTRATION; PERINEURAL
Status: COMPLETED | OUTPATIENT
Start: 2020-07-09 | End: 2020-07-09

## 2020-07-09 RX ADMIN — LIDOCAINE HYDROCHLORIDE 2 ML: 10 INJECTION, SOLUTION INFILTRATION; PERINEURAL at 14:24

## 2020-07-09 RX ADMIN — BETAMETHASONE SODIUM PHOSPHATE AND BETAMETHASONE ACETATE 12 MG: 3; 3 INJECTION, SUSPENSION INTRA-ARTICULAR; INTRALESIONAL; INTRAMUSCULAR; SOFT TISSUE at 14:23

## 2020-07-09 RX ADMIN — BUPIVACAINE HYDROCHLORIDE 2 ML: 2.5 INJECTION, SOLUTION INFILTRATION; PERINEURAL at 14:23

## 2020-07-09 RX ADMIN — BETAMETHASONE SODIUM PHOSPHATE AND BETAMETHASONE ACETATE 12 MG: 3; 3 INJECTION, SUSPENSION INTRA-ARTICULAR; INTRALESIONAL; INTRAMUSCULAR; SOFT TISSUE at 14:24

## 2020-07-09 RX ADMIN — LIDOCAINE HYDROCHLORIDE 2 ML: 10 INJECTION, SOLUTION INFILTRATION; PERINEURAL at 14:23

## 2020-07-09 RX ADMIN — BUPIVACAINE HYDROCHLORIDE 2 ML: 2.5 INJECTION, SOLUTION INFILTRATION; PERINEURAL at 14:24
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
## 2020-07-15 LAB
25(OH)D3 SERPL-MCNC: 52 NG/ML (ref 30–100)
ALBUMIN SERPL-MCNC: 4.4 G/DL (ref 3.6–5.1)
ALBUMIN/GLOB SERPL: 2 (CALC) (ref 1–2.5)
ALP SERPL-CCNC: 75 U/L (ref 35–144)
ALT SERPL-CCNC: 26 U/L (ref 9–46)
APPEARANCE UR: CLEAR
AST SERPL-CCNC: 19 U/L (ref 10–35)
BASOPHILS # BLD AUTO: 58 CELLS/UL (ref 0–200)
BASOPHILS NFR BLD AUTO: 0.8 %
BILIRUB SERPL-MCNC: 0.9 MG/DL (ref 0.2–1.2)
BILIRUB UR QL STRIP: NEGATIVE
BUN SERPL-MCNC: 15 MG/DL (ref 7–25)
BUN/CREAT SERPL: NORMAL (CALC) (ref 6–22)
CALCIUM SERPL-MCNC: 9.7 MG/DL (ref 8.6–10.3)
CHLORIDE SERPL-SCNC: 103 MMOL/L (ref 98–110)
CHOLEST SERPL-MCNC: 122 MG/DL
CHOLEST/HDLC SERPL: 2.8 (CALC)
CO2 SERPL-SCNC: 29 MMOL/L (ref 20–32)
COLOR UR: NORMAL
CREAT SERPL-MCNC: 0.77 MG/DL (ref 0.7–1.25)
EOSINOPHIL # BLD AUTO: 209 CELLS/UL (ref 15–500)
EOSINOPHIL NFR BLD AUTO: 2.9 %
ERYTHROCYTE [DISTWIDTH] IN BLOOD BY AUTOMATED COUNT: 12.3 % (ref 11–15)
GLOBULIN SER CALC-MCNC: 2.2 G/DL (CALC) (ref 1.9–3.7)
GLUCOSE SERPL-MCNC: 94 MG/DL (ref 65–99)
GLUCOSE UR QL STRIP: NEGATIVE
HCT VFR BLD AUTO: 49.5 % (ref 38.5–50)
HDLC SERPL-MCNC: 43 MG/DL
HGB BLD-MCNC: 16.7 G/DL (ref 13.2–17.1)
HGB UR QL STRIP: NEGATIVE
KETONES UR QL STRIP: NEGATIVE
LDLC SERPL CALC-MCNC: 52 MG/DL (CALC)
LEUKOCYTE ESTERASE UR QL STRIP: NEGATIVE
LYMPHOCYTES # BLD AUTO: 2189 CELLS/UL (ref 850–3900)
LYMPHOCYTES NFR BLD AUTO: 30.4 %
MCH RBC QN AUTO: 30.6 PG (ref 27–33)
MCHC RBC AUTO-ENTMCNC: 33.7 G/DL (ref 32–36)
MCV RBC AUTO: 90.8 FL (ref 80–100)
MONOCYTES # BLD AUTO: 569 CELLS/UL (ref 200–950)
MONOCYTES NFR BLD AUTO: 7.9 %
NEUTROPHILS # BLD AUTO: 4176 CELLS/UL (ref 1500–7800)
NEUTROPHILS NFR BLD AUTO: 58 %
NITRITE UR QL STRIP: NEGATIVE
NONHDLC SERPL-MCNC: 79 MG/DL (CALC)
PH UR STRIP: 6 [PH] (ref 5–8)
PLATELET # BLD AUTO: 263 THOUSAND/UL (ref 140–400)
PMV BLD REES-ECKER: 9.1 FL (ref 7.5–12.5)
POTASSIUM SERPL-SCNC: 4.3 MMOL/L (ref 3.5–5.3)
PROT SERPL-MCNC: 6.6 G/DL (ref 6.1–8.1)
PROT UR QL STRIP: NEGATIVE
PSA SERPL-MCNC: 0.7 NG/ML
RBC # BLD AUTO: 5.45 MILLION/UL (ref 4.2–5.8)
SL AMB EGFR AFRICAN AMERICAN: 113 ML/MIN/1.73M2
SL AMB EGFR NON AFRICAN AMERICAN: 97 ML/MIN/1.73M2
SODIUM SERPL-SCNC: 139 MMOL/L (ref 135–146)
SP GR UR STRIP: 1.02 (ref 1–1.03)
TRIGL SERPL-MCNC: 198 MG/DL
TSH SERPL-ACNC: 1.16 MIU/L (ref 0.4–4.5)
WBC # BLD AUTO: 7.2 THOUSAND/UL (ref 3.8–10.8)
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
## 2020-07-21 ENCOUNTER — OFFICE VISIT (OUTPATIENT)
Dept: FAMILY MEDICINE CLINIC | Facility: CLINIC | Age: 60
End: 2020-07-21
Payer: COMMERCIAL

## 2020-07-21 VITALS
BODY MASS INDEX: 36.2 KG/M2 | WEIGHT: 252.9 LBS | HEART RATE: 76 BPM | TEMPERATURE: 98.3 F | HEIGHT: 70 IN | RESPIRATION RATE: 18 BRPM | SYSTOLIC BLOOD PRESSURE: 120 MMHG | DIASTOLIC BLOOD PRESSURE: 78 MMHG

## 2020-07-21 DIAGNOSIS — Z00.00 ROUTINE GENERAL MEDICAL EXAMINATION AT A HEALTH CARE FACILITY: Primary | ICD-10-CM

## 2020-07-21 DIAGNOSIS — Z98.61 S/P PTCA (PERCUTANEOUS TRANSLUMINAL CORONARY ANGIOPLASTY): ICD-10-CM

## 2020-07-21 DIAGNOSIS — Z12.2 SPECIAL SCREENING FOR CANCER OF THE RESPIRATORY ORGANS: ICD-10-CM

## 2020-07-21 DIAGNOSIS — I10 ESSENTIAL HYPERTENSION: ICD-10-CM

## 2020-07-21 DIAGNOSIS — G47.33 OBSTRUCTIVE SLEEP APNEA SYNDROME: ICD-10-CM

## 2020-07-21 DIAGNOSIS — C44.310 FACIAL BASAL CELL CANCER: ICD-10-CM

## 2020-07-21 DIAGNOSIS — E66.01 MORBID OBESITY (HCC): ICD-10-CM

## 2020-07-21 DIAGNOSIS — E78.2 MIXED HYPERLIPIDEMIA: ICD-10-CM

## 2020-07-21 DIAGNOSIS — I25.10 CORONARY ARTERY DISEASE INVOLVING NATIVE CORONARY ARTERY OF NATIVE HEART WITHOUT ANGINA PECTORIS: ICD-10-CM

## 2020-07-21 DIAGNOSIS — Z23 NEED FOR TDAP VACCINATION: ICD-10-CM

## 2020-07-21 PROBLEM — M75.81 TENDINITIS OF RIGHT ROTATOR CUFF: Status: RESOLVED | Noted: 2019-10-14 | Resolved: 2020-07-21

## 2020-07-21 PROBLEM — M75.101 TEAR OF RIGHT SUPRASPINATUS TENDON: Status: RESOLVED | Noted: 2020-01-30 | Resolved: 2020-07-21

## 2020-07-21 PROBLEM — Z98.890 S/P RIGHT ROTATOR CUFF REPAIR: Status: RESOLVED | Noted: 2019-10-14 | Resolved: 2020-07-21

## 2020-07-21 PROCEDURE — 99215 OFFICE O/P EST HI 40 MIN: CPT | Performed by: FAMILY MEDICINE

## 2020-07-21 PROCEDURE — 3008F BODY MASS INDEX DOCD: CPT | Performed by: FAMILY MEDICINE

## 2020-07-21 PROCEDURE — 99396 PREV VISIT EST AGE 40-64: CPT | Performed by: FAMILY MEDICINE

## 2020-07-21 PROCEDURE — 1036F TOBACCO NON-USER: CPT | Performed by: FAMILY MEDICINE

## 2020-07-21 PROCEDURE — 90715 TDAP VACCINE 7 YRS/> IM: CPT | Performed by: FAMILY MEDICINE

## 2020-07-21 PROCEDURE — 3074F SYST BP LT 130 MM HG: CPT | Performed by: FAMILY MEDICINE

## 2020-07-21 PROCEDURE — 3078F DIAST BP <80 MM HG: CPT | Performed by: FAMILY MEDICINE

## 2020-07-21 PROCEDURE — 90471 IMMUNIZATION ADMIN: CPT | Performed by: FAMILY MEDICINE

## 2020-07-21 NOTE — PATIENT INSTRUCTIONS
1  Try a Tums ultra 1 a day at dinner time to help take care of cramps    2  See you back in 1 or 2 weeks to get rid of the basal cell cancer on her right temple    3  Change her citalopram to the morning and see if you sleep better at night  If you do not let me know and we should seriously consider doing a sleep study to see if you have any sleep apnea    4   Ask Dr Carlos Gonsalez of about hyaluronic acid injections, this is made from the Cocks comb to see if this helps her knees

## 2020-07-21 NOTE — PROGRESS NOTES
SUBJECTIVE:--------------------------------------------------------------------------------------------------    Cyrus Dobbs is a 56 y o   male and is here for routine health maintenance  Health Maintenance   Topic Date Due    Hepatitis C Screening  1960    DTaP,Tdap,and Td Vaccines (1 - Tdap) 04/12/1969    HIV Screening  04/12/1973    Annual Physical  04/12/1976    Influenza Vaccine  07/01/2020    PT PLAN OF CARE  07/03/2020    BMI: Followup Plan  01/06/2021    Depression Screening PHQ  02/03/2021    BMI: Adult  07/21/2021    Pneumococcal Vaccine: 65+ Years (1 of 2 - PCV13) 04/12/2023    CRC Screening: Colonoscopy  04/24/2029    Pneumococcal Vaccine: Pediatrics (0 to 5 Years) and At-Risk Patients (6 to 59 Years)  Aged Out    HIB Vaccine  Aged Out    Hepatitis B Vaccine  Aged Out    IPV Vaccine  Aged Out    Hepatitis A Vaccine  Aged Out    Meningococcal ACWY Vaccine  Aged Out    HPV Vaccine  Aged [employer] History   Administered Date(s) Administered    Influenza Injectable, MDCK, Preservative Free, Quadrivalent, 0 5 mL 12/02/2018    Influenza, recombinant, quadrivalent,injectable, preservative free 09/30/2019    Zoster Vaccine Recombinant 02/22/2019, 04/22/2019       Diet and Physical Activity  Diet: well balanced diet  Weight concerns: Patient has class 2 obesity (BMI 35 0-39  9)  Exercise: frequently       General Health  Hearing:  Normal reported by patient  Vision:  Sees Ophthalmology/Optometry yearly  Dental:  Sees his dentist every 6 months    Smoker not since 2015       ASSESSMENT/PLAN:---------------------------------------------------------------------------------------    Patient's physical is up-to-date  Immunizations:  Adacel today  Cancer screenings are up-to-date      Patient instructed on exercise  Patient instructed on healthy choices for diet      NEXT PHYSICAL 1 YEAR        The following portions of the patient's history were reviewed and updated as appropriate: allergies, current medications, past family history, past medical history, past social history, past surgical history and problem list       OBJECTIVE:--------------------------------------------------------------------------------------------------  /78 (BP Location: Left arm, Patient Position: Sitting, Cuff Size: Large)   Pulse 76   Temp 98 3 °F (36 8 °C) (Oral)   Resp 18   Ht 5' 10" (1 778 m)   Wt 115 kg (252 lb 14 4 oz)   BMI 36 29 kg/m²   Wt Readings from Last 3 Encounters:   07/21/20 115 kg (252 lb 14 4 oz)   07/09/20 116 kg (255 lb)   03/03/20 118 kg (260 lb)     BP Readings from Last 3 Encounters:   07/21/20 120/78   03/03/20 119/81   02/10/20 121/84     Pulse Readings from Last 3 Encounters:   07/21/20 76   03/03/20 (!) 110   02/10/20 99     Body mass index is 36 29 kg/m²    Health Maintenance   Topic Date Due    Hepatitis C Screening  1960    DTaP,Tdap,and Td Vaccines (1 - Tdap) 04/12/1969    HIV Screening  04/12/1973    Annual Physical  04/12/1976    Influenza Vaccine  07/01/2020    PT PLAN OF CARE  07/03/2020    BMI: Followup Plan  01/06/2021    Depression Screening PHQ  02/03/2021    BMI: Adult  07/21/2021    Pneumococcal Vaccine: 65+ Years (1 of 2 - PCV13) 04/12/2023    CRC Screening: Colonoscopy  04/24/2029    Pneumococcal Vaccine: Pediatrics (0 to 5 Years) and At-Risk Patients (6 to 59 Years)  Aged Out    HIB Vaccine  Aged Out    Hepatitis B Vaccine  Aged Out    IPV Vaccine  Aged Out    Hepatitis A Vaccine  Aged Out    Meningococcal ACWY Vaccine  Aged Out    HPV Vaccine  Aged Out         Laboratory Results:   Lab Results   Component Value Date    WBC 7 2 07/14/2020    WBC 13 12 (H) 01/07/2014    HGB 16 7 07/14/2020    HGB 15 7 01/07/2014    HCT 49 5 07/14/2020    HCT 46 1 01/07/2014    MCV 90 8 07/14/2020    MCV 90 01/07/2014     07/14/2020     01/07/2014     Lab Results   Component Value Date    BUN 15 07/14/2020    BUN 13 01/07/2014 Lab Results   Component Value Date    GLUCOSE 100 01/07/2014    ALT 26 07/14/2020    ALT 23 12/21/2019    AST 19 07/14/2020    AST 19 12/21/2019     Lab Results   Component Value Date    TSH 1 16 07/14/2020     Lab Results   Component Value Date    HGBA1C 5 4 02/18/2019    HGBA1C 5 2 10/25/2017       Lipid Profile:   No results found for: CHOL  Lab Results   Component Value Date    HDL 43 07/14/2020    HDL 41 12/21/2019     Lab Results   Component Value Date    LDLCALC 52 07/14/2020    [address] 12/21/2019     Lab Results   Component Value Date    TRIG 198 (H) 07/14/2020    TRIG 192 (H) 12/21/2019       ROS:   12 point review of systems negative            PHYSICAL EXAM:    Gen  No acute distress well-appearing well-nourished appears stated age    Mental status  Good judgment and insight oriented to time person and place, recent and remote memory intact mood and affect normal cooperative and patient is reasonable    HEENT  PERRLA 3 mm, EOMI without nystagmus, TMs clear, turbinates open pink no exudate, pharynx benign, tongue midline    Neck   supple no masses trachea midline positive click normal carotid upstrokes with no bruits    Cor  Regular rhythm without ectopy or murmur, no S3-S4, normal palpation that is no heave lift or thrill    Vascular  No edema, good pedal pulses    Lungs  CTA bilaterally in no respiratory distress no wheezes rhonchi or rales, normal to palpation no tactile fremitus    Abdomen  Soft, no palpable masses, no hepatosplenomegaly, normal bowel sounds, nontender    Lymphatics  No palpable nodes in the neck, supraclavicular area, axilla, or groin     Musculoskeletal  No clubbing cyanosis or edema muscle tone normal    Skin  no rashes or abnormal appearing lesions    Neuro  Normal ambulation, cranial nerves 2-12 grossly intact, higher functioning with reasoning intact

## 2020-07-25 DIAGNOSIS — I25.119 CORONARY ARTERY DISEASE WITH ANGINA PECTORIS, UNSPECIFIED VESSEL OR LESION TYPE, UNSPECIFIED WHETHER NATIVE OR TRANSPLANTED HEART (HCC): ICD-10-CM

## 2020-07-25 DIAGNOSIS — F41.8 DEPRESSION WITH ANXIETY: ICD-10-CM

## 2020-07-25 DIAGNOSIS — I10 ESSENTIAL HYPERTENSION: ICD-10-CM

## 2020-07-27 RX ORDER — LISINOPRIL 5 MG/1
TABLET ORAL
Qty: 90 TABLET | Refills: 3 | Status: SHIPPED | OUTPATIENT
Start: 2020-07-27 | End: 2021-06-20

## 2020-07-27 RX ORDER — CITALOPRAM 40 MG/1
TABLET ORAL
Qty: 90 TABLET | Refills: 3 | Status: SHIPPED | OUTPATIENT
Start: 2020-07-27 | End: 2021-06-20

## 2020-07-30 ENCOUNTER — HOSPITAL ENCOUNTER (OUTPATIENT)
Dept: CT IMAGING | Facility: HOSPITAL | Age: 60
Discharge: HOME | End: 2020-07-30
Payer: COMMERCIAL

## 2020-07-30 DIAGNOSIS — Z12.2 SPECIAL SCREENING FOR CANCER OF THE RESPIRATORY ORGANS: ICD-10-CM

## 2020-07-30 PROCEDURE — G0297 LDCT FOR LUNG CA SCREEN: HCPCS

## 2020-08-02 NOTE — PROGRESS NOTES
COVID-19 Virtual Visit  Patient seen with his wife in the room because patient is hard of hearing    Starting Sunday night, patient with congestion sore throat and temperature  Today 99 4  He is taking Tylenol knee does feel better  He was planning on going to [facility] to a house he has down there by flying this weekend    Assessment/Plan:  1  COVID testing  2  Cancel appointment for flight to [facility]  3  Tylenol extra-strength 2 tablets 3 times a day  4  Mucinex with 8 oz of water twice a day  5  Isolation until COVID test is known        Problem List Items Addressed This Visit     None        This virtual check-in was done via [employer] and patient was informed that this is not a secure, HIPAA-complaint platform  He agrees to proceed     Disposition:      I referred Willie Rosenberg to one of our centralized sites for a COVID-19 swab    I spent 15 minutes directly with the patient during this visit    Encounter provider Beverley Alonso DO    Provider located at [address] [place] [id number] [phone]    Recent Visits  Date Type Provider Dept   08/03/20 Telephone Beverley Alonso, [address] recent visits within past 7 days and meeting all other requirements     Today's Visits  Date Type Provider Dept   08/04/20 [address], [address] [facility]   Showing today's visits and meeting all other requirements     Future Appointments  No visits were found meeting these conditions  Showing future appointments within next 150 days and meeting all other requirements        Patient agrees to participate in a virtual check in via telephone or video visit instead of presenting to the office to address urgent/immediate medical needs  Patient is aware this is a billable service         After connecting through [employer], the patient was identified by name and date of birth  Johnson Rosas was informed that this was a telemedicine visit and that the exam was being conducted confidentially over secure lines  My office door was closed  No one else was in the room  Johnson Rosas acknowledged consent and understanding of privacy and security of the telemedicine visit  I informed the patient that I have reviewed his record in Epic and presented the opportunity for him to ask any questions regarding the visit today  The patient agreed to participate  Subjective  Johnson Rosas is a 56 y o  male who is concerned about COVID-19  He reports sore throat  He has not experienced anosmia He has not had contact with a person who is under investigation for or who is positive for COVID-19 within the last 14 days  He has not been hospitalized recently for fever and/or lower respiratory symptoms      Past Medical History:   Diagnosis Date    Hyperlipidemia     Hypertension     Myocardial infarction ([facility] [address] ) 2015       Past Surgical History:   Procedure Laterality Date    ANGIOPLASTY      ANKLE FUSION      CARDIAC SURGERY  2015    stent    CARPAL TUNNEL RELEASE Bilateral     COLONOSCOPY      KNEE ARTHROSCOPY Bilateral     MENISCECTOMY Bilateral     OH COLONOSCOPY FLX DX W/COLLJ SPEC WHEN PFRMD N/A 4/24/2019    Procedure: COLONOSCOPY with polypectomies;  Surgeon: Hermilo Talbert DO;  Location:  MAIN OR;  Service: Gastroenterology    [place] SHLDR ARTHROSCOP,SURG,W/ROTAT CUFF REPR Right 2/7/2020    Procedure: SHOULDER ARTHROSCOPIC ROTATOR CUFF REPAIR; SAD, BICEPS TENODESIS;  Surgeon: Elmo Urbina MD;  Location: [facility]  MAIN OR;  Service: Orthopedics    ROTATOR CUFF REPAIR Left     TONSILLECTOMY         Current Outpatient Medications   Medication Sig Dispense Refill    aspirin 81 mg CHEW Chew 81 mg      atorvastatin (LIPITOR) 40 mg tablet Take 1 tablet (40 mg total) by mouth daily at bedtime 90 tablet 1    citalopram (CeleXA) 40 mg tablet TAKE 1 TABLET BY MOUTH  DAILY 90 tablet 3    doxycycline (ORACEA) 40 MG capsule Take 1 capsule (40 mg total) by mouth every morning 90 capsule 1    lisinopril (ZESTRIL) 5 mg tablet TAKE 1 TABLET BY MOUTH  DAILY 90 tablet 3    SOOLANTRA 1 % CREA       Vitamin D, Cholecalciferol, 1000 units TABS Take 1 tablet (1,000 Units total) by mouth daily 90 tablet 1     No current facility-administered medications for this visit  No Known Allergies    Review of Systems    Video Exam    Vitals:    08/04/20 0913   Temp: 99 4 °F (37 4 °C)   Weight: 113 kg (250 lb)         Nancy Valente appears mild distress  Physical Exam   Physical Exam   General:  Patient is oriented to person, place, and time  Patient does appear ill but in no acute distress  Mental status:  Awake, reasonable, focused, relevant  HEENT-sounds congested, normocephalic atraumatic without facial weakness, no facial pallor or flushing or cyanosis  Pulmonary/Chest: Effort normal   No coughing  No signs of respiratory distress, tachypnea, conversational dyspnea or audible wheezing noted  Psychiatric: has a normal moriented to person, place, and time  ood and affect  Her behavior is normal  Thought content normal      VIRTUAL VISIT DISCLAIMER    Sage William acknowledges that he has consented to an online visit or consultation  He understands that the online visit is based solely on information provided by him, and that, in the absence of a face-to-face physical evaluation by the physician, the diagnosis he receives is both limited and provisional in terms of accuracy and completeness  This is not intended to replace a full medical face-to-face evaluation by the physician  Sage William understands and accepts these terms

## 2020-08-03 ENCOUNTER — TELEPHONE (OUTPATIENT)
Dept: FAMILY MEDICINE CLINIC | Facility: CLINIC | Age: 60
End: 2020-08-03

## 2020-08-03 NOTE — TELEPHONE ENCOUNTER
Patient is calling because he developed a head cold stuffy, sore throat no fever  Patient is asking if you can send over a prescription for head cold  Patient has apt tomorrow do you want me to reschedule apt for what he was coming in for and add in a virtual for head cold? I have no openings for you today

## 2020-08-04 ENCOUNTER — TELEMEDICINE (OUTPATIENT)
Dept: FAMILY MEDICINE CLINIC | Facility: CLINIC | Age: 60
End: 2020-08-04
Payer: COMMERCIAL

## 2020-08-04 VITALS — BODY MASS INDEX: 35.87 KG/M2 | TEMPERATURE: 99.4 F | WEIGHT: 250 LBS

## 2020-08-04 DIAGNOSIS — Z20.822 SUSPECTED COVID-19 VIRUS INFECTION: ICD-10-CM

## 2020-08-04 DIAGNOSIS — Z20.822 SUSPECTED COVID-19 VIRUS INFECTION: Primary | ICD-10-CM

## 2020-08-04 PROCEDURE — U0003 INFECTIOUS AGENT DETECTION BY NUCLEIC ACID (DNA OR RNA); SEVERE ACUTE RESPIRATORY SYNDROME CORONAVIRUS 2 (SARS-COV-2) (CORONAVIRUS DISEASE [COVID-19]), AMPLIFIED PROBE TECHNIQUE, MAKING USE OF HIGH THROUGHPUT TECHNOLOGIES AS DESCRIBED BY CMS-2020-01-R: HCPCS | Performed by: FAMILY MEDICINE

## 2020-08-04 PROCEDURE — 99213 OFFICE O/P EST LOW 20 MIN: CPT | Performed by: FAMILY MEDICINE

## 2020-08-04 PROCEDURE — 1036F TOBACCO NON-USER: CPT | Performed by: FAMILY MEDICINE

## 2020-08-05 LAB — SARS-COV-2 RNA SPEC QL NAA+PROBE: NOT DETECTED

## 2020-08-05 NOTE — RESULT ENCOUNTER NOTE
Call patient regarding [address] test which is NEGATIVE  Patient to continue to socially distancing and wearing a mask when in public

## 2020-08-05 NOTE — RESULT ENCOUNTER NOTE
Please call to inform patient that is CT lung screen for cancers negative  We should check it again next year

## 2020-08-06 ENCOUNTER — TELEPHONE (OUTPATIENT)
Dept: FAMILY MEDICINE CLINIC | Facility: CLINIC | Age: 60
End: 2020-08-06

## 2020-08-06 NOTE — TELEPHONE ENCOUNTER
----- Message from Venita Carrion DO sent at 8/5/2020  3:36 PM EDT -----  Call patient regarding [address] test which is NEGATIVE  Patient to continue to socially distancing and wearing a mask when in public      Left detailed message to patient

## 2020-08-06 NOTE — TELEPHONE ENCOUNTER
----- Message from Adryan Rodríguez DO sent at 8/5/2020  3:14 PM EDT -----  Please call to inform patient that is CT lung screen for cancers negative  We should check it again next year  Left message to call back

## 2020-09-13 DIAGNOSIS — E78.2 MIXED HYPERLIPIDEMIA: ICD-10-CM

## 2020-09-13 RX ORDER — ATORVASTATIN CALCIUM 40 MG/1
TABLET, FILM COATED ORAL
Qty: 90 TABLET | Refills: 0 | Status: SHIPPED | OUTPATIENT
Start: 2020-09-13 | End: 2021-01-04

## 2021-01-04 DIAGNOSIS — E78.2 MIXED HYPERLIPIDEMIA: ICD-10-CM

## 2021-01-04 RX ORDER — ATORVASTATIN CALCIUM 40 MG/1
TABLET, FILM COATED ORAL
Qty: 90 TABLET | Refills: 0 | Status: SHIPPED | OUTPATIENT
Start: 2021-01-04 | End: 2021-04-05

## 2021-01-09 NOTE — PROGRESS NOTES
ASSESSMENT/PLAN:    Adjustment disorder with depressed mood  Patient already on 40 of citalopram continue the same  Will add Abilify and recheck in 6 weeks    Basal cell cancer  Patient to come back in 6 weeks to have this removed     Hyperlipidemia  Cholesterol and LDL values are excellent  Continue atorvastatin, recheck in July line     Coronary artery disease/status post PTCA with stent 2015  Dr Gregorio Bowen Cardiology follows him from [place] baby aspirin and atorvastatin     Vitamin-D deficiency  Continue vitamin-D 1000 units daily  Nelida Vit D qd      History of tobacco abuse  CT scan screen March 2019 no nodules  CT scan July 2020 no nodules  Patient would like to have another done in July 2020  Patient has not smoked since his stent in 2015     Adenoma is in the colon as well as serrated adenomas  Patient's next colonoscopy is April 2022  Balwinder Moreno [place]     Recheck sooner if needed      BMI Counseling: Body mass index is 37 07 kg/m²  The BMI is above normal  Nutrition recommendations include decreasing portion sizes and encouraging healthy choices of fruits and vegetables  Exercise recommendations include exercising 3-5 times per week                Health Maintenance   Topic Date Due    Hepatitis C Screening  1960    HIV Screening  04/12/1973    PT PLAN OF CARE  07/03/2020    BMI: Followup Plan  01/06/2021    Annual Physical  07/21/2021    Depression Screening PHQ  01/12/2022    BMI: Adult  01/12/2022    Colorectal Cancer Screening  04/24/2029    DTaP,Tdap,and Td Vaccines (2 - Td) 07/21/2030    Influenza Vaccine  Completed    Pneumococcal Vaccine: Pediatrics (0 to 5 Years) and At-Risk Patients (6 to 59 Years)  Aged Out    HIB Vaccine  Aged Out    Hepatitis B Vaccine  Aged Out    IPV Vaccine  Aged Out    Hepatitis A Vaccine  Aged Out    Meningococcal ACWY Vaccine  Aged Out    HPV Vaccine  Aged Out         Problem List as of 1/12/2021 Reviewed: 8/4/2020 11:00 AM by Nickie Chery, DO    Chronic pain of both knees    Coronary artery disease involving native coronary artery of native heart without angina pectoris    Essential hypertension    Facial basal cell cancer    History of colonic polyps    Mixed hyperlipidemia    Morbid obesity (HCC)    Obstructive sleep apnea syndrome    Patellofemoral arthritis    Pes anserinus bursitis of both knees    Primary osteoarthritis of both knees    S/P PTCA (percutaneous transluminal coronary angioplasty)            Subjective:   Chief Complaint   Patient presents with    Hypertension    Hyperlipidemia    Depression    Anxiety     Patient is here for recheck on his blood pressure  He never did come back to get his basal cell taking care of on his right temple    He did see Cardiology since last visit he did not change anything, but they did check an echo that had no changes as well    He also had a CT of his chest in July that showed no changes from his smoking which she stopped 3 years ago after having CAD    He did gain 13 lb over the last 6 months  He has an that time retired and as a result feels very depressed because he was a workaholic and now he is doing nothing  He has no purpose  He is planning to moved to [facility] in the next 6 months a just feels incredibly down at this time  However tomorrow he is planning to go to [facility] and he feels much better  He has guilt over not being with his kids as they grew up and feeling like it is his duty to take care of them before he leaves      patient ID: Karlos Alexandre is a 56 y o  male      Past Medical History:   Diagnosis Date    Hyperlipidemia     Hypertension     Myocardial infarction [facility]) 2015     Past Surgical History:   Procedure Laterality Date    ANGIOPLASTY      ANKLE FUSION      CARDIAC SURGERY  2015    stent    CARPAL TUNNEL RELEASE Bilateral     COLONOSCOPY      KNEE ARTHROSCOPY Bilateral     MENISCECTOMY Bilateral     CA COLONOSCOPY FLX DX W/COLLJ SPEC WHEN PFRMD N/A 4/24/2019    Procedure: COLONOSCOPY with polypectomies;  Surgeon: Merna Dove DO;  Location: [facility] MA[facility];  Service: Gastroenterology    [place] SHLDR ARTHROSCOP,SURG,W/ROTAT CUFF REPR Right 2/7/2020    Procedure: SHOULDER ARTHROSCOPIC ROTATOR CUFF REPAIR; SAD, BICEPS TENODESIS;  Surgeon: Abhijit Mcfarland MD;  [facility];  Service: Orthopedics    ROTATOR CUFF REPAIR Left     TONSILLECTOMY       Family History   Problem Relation Age of Onset   [facility] Breast cancer Mother     COPD Father     Alcohol abuse Neg Hx     Substance Abuse Neg Hx     Mental illness Neg Hx      Social History     Tobacco Use    Smoking status: Former Smoker     Quit date: 5/16/2017     Years since quitting: 3 6    Smokeless tobacco: Never Used   Substance Use Topics    Alcohol use: Yes     Alcohol/week: 1 0 standard drinks     Types: 1 Cans of beer per week     Frequency: Monthly or less     Drinks per session: 1 or 2     Comment: Socially    Drug use: Never     Social History     Tobacco Use   Smoking Status Former Smoker    Quit date: 5/16/2017    Years since quitting: 3 6   Smokeless Tobacco Never Used        MED LIST WAS REVIEWED AND UPDATED       ROS  As per HPI  Rest of 12 point review of systems negative     Objective:      VITALS:  Wt Readings from Last 3 Encounters:   01/12/21 121 kg (265 lb 12 8 oz)   08/04/20 113 kg (250 lb)   07/21/20 115 kg (252 lb 14 4 oz)     BP Readings from Last 3 Encounters:   01/12/21 120/82   07/21/20 120/78   03/03/20 119/81     Pulse Readings from Last 3 Encounters:   01/12/21 99   07/21/20 76   03/03/20 (!) 110     Body mass index is 37 07 kg/m²  Laboratory Results:    All pertinent labs and studies were reviewed with patient during this office visit  with highlights of the results contained in this notes ASSESSMENT AND PLAN section       Physical Exam  Constitutional  Appears healthy, Looks well, Appearance consistent with age    Mental Status  Patient has good eye contact but is very monotone and in seems depressed  Alert, Oriented, Cooperative, Memory function normal , clean, and reasonable    Neck  No neck mass, No thyromegaly, Good carotid upstrokes bilaterally, trachea midline positive click    Respiratory  Breath sounds normal, No rales, No rhonchi, No wheezing, normal palpation    Cardiac   Regular rhythm without ectopy or murmur no S3-S4, no heave lift or thrill to palpation    Vascular  No leg edema, No pedal edema    Muscular skeletal  No clubbing cyanosis , muscle tone normal    Skin  No appreciable rashes or abnormal appearing lesions

## 2021-01-12 ENCOUNTER — OFFICE VISIT (OUTPATIENT)
Dept: FAMILY MEDICINE CLINIC | Facility: CLINIC | Age: 61
End: 2021-01-12
Payer: COMMERCIAL

## 2021-01-12 VITALS
WEIGHT: 265.8 LBS | SYSTOLIC BLOOD PRESSURE: 120 MMHG | TEMPERATURE: 97.9 F | HEART RATE: 99 BPM | RESPIRATION RATE: 16 BRPM | HEIGHT: 71 IN | BODY MASS INDEX: 37.21 KG/M2 | DIASTOLIC BLOOD PRESSURE: 82 MMHG

## 2021-01-12 DIAGNOSIS — F43.21 ADJUSTMENT DISORDER WITH DEPRESSED MOOD: Primary | ICD-10-CM

## 2021-01-12 PROCEDURE — 99215 OFFICE O/P EST HI 40 MIN: CPT | Performed by: FAMILY MEDICINE

## 2021-01-12 PROCEDURE — 3074F SYST BP LT 130 MM HG: CPT | Performed by: FAMILY MEDICINE

## 2021-01-12 PROCEDURE — 1036F TOBACCO NON-USER: CPT | Performed by: FAMILY MEDICINE

## 2021-01-12 PROCEDURE — 3008F BODY MASS INDEX DOCD: CPT | Performed by: FAMILY MEDICINE

## 2021-01-12 PROCEDURE — 3079F DIAST BP 80-89 MM HG: CPT | Performed by: FAMILY MEDICINE

## 2021-01-12 PROCEDURE — 3725F SCREEN DEPRESSION PERFORMED: CPT | Performed by: FAMILY MEDICINE

## 2021-01-12 RX ORDER — ARIPIPRAZOLE 2 MG/1
2 TABLET ORAL DAILY
Qty: 30 TABLET | Refills: 1 | Status: SHIPPED | OUTPATIENT
Start: 2021-01-12 | End: 2021-02-03

## 2021-01-12 NOTE — PATIENT INSTRUCTIONS
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

## 2021-02-03 DIAGNOSIS — F43.21 ADJUSTMENT DISORDER WITH DEPRESSED MOOD: ICD-10-CM

## 2021-02-03 RX ORDER — ARIPIPRAZOLE 2 MG/1
2 TABLET ORAL DAILY
Qty: 30 TABLET | Refills: 1 | Status: SHIPPED | OUTPATIENT
Start: 2021-02-03 | End: 2021-02-28

## 2021-02-18 NOTE — PROGRESS NOTES
ASSESSMENT/PLAN:    Depression  Continue citalopram patient is changed to the day  Continue Abilify as it seems to have boosted the response and is only 6 weeks  Seems to be exacerbated by patient's tinnitis as it is very aggravating    Insomnia due to tinnitis  Patient is incredibly aggravated by waking up and not being able to sleep on interrupted  He really feels part of this is due to the tinnitis as that is always what seems to wake him up  We will do a trial of trazodone once nightly to see if that helps him stay asleep and helps him to have uninterrupted sleep  Basal cell cancer    Biopsy    Date/Time: 2/22/2021 3:16 PM  Performed by: Jeremy Krueger DO  Authorized by: Jeremy Krueger DO   Universal Protocol:  Consent: Verbal consent obtained  Written consent obtained  Risks and benefits: risks, benefits and alternatives were discussed  Consent given by: patient  Patient understanding: patient states understanding of the procedure being performed  Patient identity confirmed: verbally with patient      Procedure Details - Skin Biopsy:     Biopsy tissue type: skin    Biopsy method: shave biopsy      Initial size (mm):  8    Final defect size (mm):  10    Malignancy: malignant lesion      Skin cancer type: basal cell carcinoma      Shave biopsy initially done  Treatment than with fulguration and curetting x3  Lesion Destruction    Date/Time: 2/22/2021 3:18 PM  Performed by: Jeremy Krueger DO  Authorized by: Jeremy Krueger DO   Universal Protocol:  Consent: Verbal consent obtained  Written consent obtained    Risks and benefits: risks, benefits and alternatives were discussed  Consent given by: patient  Patient understanding: patient states understanding of the procedure being performed  Patient identity confirmed: verbally with patient      Procedure Details - Lesion Destruction:     Number of Lesions:  1  Lesion 1:     Body area:  Head/neck    Head/neck location:  Forehead    Initial size (mm):  10    Final defect size (mm):  25    Malignancy: malignant lesion      Skin cancer type: basal cell carcinoma    Destruction method: electrodesiccation and curettage        Fulguration and curettage done after the initial shave biopsy times a total of 3 rounds      Patient tolerated the procedure well   Antibiotic ointment and dressing applied   Discharge instructions given              Recheck sooner if needed          Health Maintenance   Topic Date Due    Hepatitis C Screening  1960    HIV Screening  04/12/1973    PT PLAN OF CARE  07/03/2020    COVID-19 Vaccine (2 of 2 - Moderna series) 03/12/2021    Annual Physical  07/21/2021    Depression Screening PHQ  01/12/2022    BMI: Followup Plan  01/12/2022    BMI: Adult  02/22/2022    Colorectal Cancer Screening  04/24/2029    DTaP,Tdap,and Td Vaccines (3 - Td) 01/23/2031    Influenza Vaccine  Completed    Pneumococcal Vaccine: Pediatrics (0 to 5 Years) and At-Risk Patients (6 to 59 Years)  Aged Out    HIB Vaccine  Aged Out    Hepatitis B Vaccine  Aged Out    IPV Vaccine  Aged Out    Hepatitis A Vaccine  Aged Out    Meningococcal ACWY Vaccine  Aged Out    HPV Vaccine  Aged Out         Problem List as of 2/22/2021 Reviewed: 8/4/2020 11:00 AM by Stefanie Price DO    Chronic pain of both knees    Coronary artery disease involving native coronary artery of native heart without angina pectoris    Essential hypertension    Facial basal cell cancer    History of colonic polyps    Mixed hyperlipidemia    Morbid obesity ([address] )    Obstructive sleep apnea syndrome    Patellofemoral arthritis    Pes anserinus bursitis of both knees    Primary osteoarthritis of both knees    S/P PTCA (percutaneous transluminal coronary angioplasty)            Subjective:   Chief Complaint   Patient presents with    Depression       Patient is here for recheck regarding his depression  Now he finds that he really cannot sleep did not sleep that well before  He has not has had terrible tinnitus that wakes him up from sleep  He will go to bed wake up in 2 hours be up because of it, will ventrally fall sleep wake up again because of the tinnitis then fall back to sleep  The tinnitis is like a smoke detector going off in his head  He would like to try something to stop the no ways    He thinks the Abilify is helping some because he has had some motivation to get some things done that includes helping people  He has started some projects and has some projects pending        patient ID: Lizzy La is a 56 y o  male  Past Medical History:   Diagnosis Date    Hyperlipidemia     Hypertension     Myocardial infarction [facility]) 2015     Past Surgical History:   Procedure Laterality Date    ANGIOPLASTY      ANKLE FUSION      CARDIAC SURGERY  2015    stent    CARPAL TUNNEL RELEASE Bilateral     COLONOSCOPY      KNEE ARTHROSCOPY Bilateral     MENISCECTOMY Bilateral     AL COLONOSCOPY FLX DX W/COLLJ SPEC WHEN PFRMD N/A 4/24/2019    Procedure: COLONOSCOPY with polypectomies;  Surgeon: Merna Dove DO;  Location:  MAIN OR;  Service: Gastroenterology    [place] SHLDR ARTHROSCOP,SURG,W/ROTAT CUFF REPR Right 2/7/2020    Procedure: SHOULDER ARTHROSCOPIC ROTATOR CUFF REPAIR; SAD, BICEPS TENODESIS;  Surgeon: Abhijit Mcfarland MD;  Location: [facility]  MAIN [facility];  Service: Orthopedics    ROTATOR CUFF REPAIR Left     TONSILLECTOMY       Family History   Problem Relation Age of Onset   [facility] Breast cancer Mother     COPD Father     Alcohol abuse Neg Hx     Substance Abuse Neg Hx     Mental illness Neg Hx      Social History     Tobacco Use    Smoking status: Former Smoker     Quit date: 5/16/2017     Years since quitting: 3 7    Smokeless tobacco: Never Used   Substance Use Topics    Alcohol use:  Yes     Alcohol/week: 1 0 standard drinks     Types: 1 Cans of beer per week     Frequency: Monthly or less     Drinks per session: 1 or 2     Comment: Socially    Drug use: Never     Social History     Tobacco Use Smoking Status Former Smoker    Quit date: 5/16/2017    Years since quitting: 3 7   Smokeless Tobacco Never Used        MED LIST WAS REVIEWED AND UPDATED       ROS  As per HPI  Rest of 12 point review of systems negative     Objective:      VITALS:  Wt Readings from Last 3 Encounters:   02/22/21 119 kg (263 lb 6 4 oz)   01/12/21 121 kg (265 lb 12 8 oz)   08/04/20 113 kg (250 lb)     BP Readings from Last 3 Encounters:   02/22/21 124/80   01/12/21 120/82   07/21/20 120/78     Pulse Readings from Last 3 Encounters:   02/22/21 88   01/12/21 99   07/21/20 76     Body mass index is 37 79 kg/m²  Laboratory Results: All pertinent labs and studies were reviewed with patient during this office visit  with highlights of the results contained in this notes ASSESSMENT AND PLAN section       Physical Exam  General  Patient in no acute distress, well appearing, well nourished and appears stated age    Mental status  Good judgment and insight, oriented to time person and place, recent and remote memory is intact, mood and affect are flat in quiet, cooperative, and patient is reasonable      Skin  Right temple has 8 or 9 mm red raised pearly bordered edged lesion

## 2021-02-22 ENCOUNTER — OFFICE VISIT (OUTPATIENT)
Dept: FAMILY MEDICINE CLINIC | Facility: CLINIC | Age: 61
End: 2021-02-22
Payer: COMMERCIAL

## 2021-02-22 VITALS
SYSTOLIC BLOOD PRESSURE: 124 MMHG | RESPIRATION RATE: 15 BRPM | TEMPERATURE: 98.2 F | BODY MASS INDEX: 37.71 KG/M2 | DIASTOLIC BLOOD PRESSURE: 80 MMHG | WEIGHT: 263.4 LBS | HEART RATE: 88 BPM | HEIGHT: 70 IN

## 2021-02-22 DIAGNOSIS — F51.02 ADJUSTMENT INSOMNIA: Primary | ICD-10-CM

## 2021-02-22 DIAGNOSIS — C44.310 FACIAL BASAL CELL CANCER: ICD-10-CM

## 2021-02-22 DIAGNOSIS — F32.A DEPRESSIVE DISORDER: ICD-10-CM

## 2021-02-22 DIAGNOSIS — H93.13 TINNITUS OF BOTH EARS: ICD-10-CM

## 2021-02-22 PROBLEM — K63.5 POLYP OF COLON: Status: ACTIVE | Noted: 2021-02-22

## 2021-02-22 PROBLEM — L71.9 ROSACEA: Status: ACTIVE | Noted: 2021-02-22

## 2021-02-22 PROCEDURE — 99214 OFFICE O/P EST MOD 30 MIN: CPT | Performed by: FAMILY MEDICINE

## 2021-02-22 PROCEDURE — 3725F SCREEN DEPRESSION PERFORMED: CPT | Performed by: FAMILY MEDICINE

## 2021-02-22 PROCEDURE — 3008F BODY MASS INDEX DOCD: CPT | Performed by: FAMILY MEDICINE

## 2021-02-22 PROCEDURE — 11310 SHAVE SKIN LESION 0.5 CM/<: CPT | Performed by: FAMILY MEDICINE

## 2021-02-22 PROCEDURE — 1036F TOBACCO NON-USER: CPT | Performed by: FAMILY MEDICINE

## 2021-02-22 PROCEDURE — 17283 DSTR MAL LS F/E/E/N/L/M2.1-3: CPT | Performed by: FAMILY MEDICINE

## 2021-02-22 RX ORDER — TRAZODONE HYDROCHLORIDE 150 MG/1
150 TABLET ORAL
Qty: 90 TABLET | Refills: 1 | Status: SHIPPED | OUTPATIENT
Start: 2021-02-22

## 2021-02-22 NOTE — PATIENT INSTRUCTIONS
For your depression:  -continue this citalopram  -continue the Abilify  -Begin trazodone 150 mg at bedtime for sleep  You can try half a pill 1st to see if it works and if it does not then do the whole pill      For your surgery:    Keep the area clean and dry for the rest of today  Tomorrow take off the dressing and take a shower or wash as she normally would  Please wash this area with soap and water being careful to the area  Please put antibiotic or Vaseline on the pad of a Band-Aid and cover this area  If you cannot reach your self, please ask someone to assist you  Please do this for at least 10 days or until it is totally healed    Please call if you see any signs of infection such as:  Redness  Discharge  You have pain  The area becomes swollen      Please have  hearing checked at the [place] and see if they can give you hearing aids to see if it helps a tinnitis  Sometimes it does sometimes a does not    Please show the following to the [place]:      Depression  Continue citalopram patient is changed to the day  Continue Abilify as it seems to have boosted the response and is only 6 weeks  Seems to be exacerbated by patient's tinnitis as it is very aggravating    Insomnia due to tinnitis  Patient is incredibly aggravated by waking up and not being able to sleep on interrupted  He really feels part of this is due to the tinnitis as that is always what seems to wake him up  We will do a trial of trazodone once nightly to see if that helps him stay asleep and helps him to have uninterrupted sleep

## 2021-02-26 ENCOUNTER — TELEPHONE (OUTPATIENT)
Dept: FAMILY MEDICINE CLINIC | Facility: CLINIC | Age: 61
End: 2021-02-26

## 2021-02-26 DIAGNOSIS — F43.21 ADJUSTMENT DISORDER WITH DEPRESSED MOOD: ICD-10-CM

## 2021-02-26 NOTE — TELEPHONE ENCOUNTER
----- Message from Joe Padilla DO sent at 2/25/2021  8:32 PM EST -----  Please call patient with his pathology   It is a basal cell cancer as we discussed   We will watch this for any new growths but I do not expect any

## 2021-02-26 NOTE — RESULT ENCOUNTER NOTE
Please call patient with his pathology   It is a basal cell cancer as we discussed   We will watch this for any new growths but I do not expect any

## 2021-02-28 RX ORDER — ARIPIPRAZOLE 2 MG/1
TABLET ORAL
Qty: 90 TABLET | Refills: 1 | Status: SHIPPED | OUTPATIENT
Start: 2021-02-28

## 2021-04-04 DIAGNOSIS — E78.2 MIXED HYPERLIPIDEMIA: ICD-10-CM

## 2021-04-05 RX ORDER — ATORVASTATIN CALCIUM 40 MG/1
TABLET, FILM COATED ORAL
Qty: 90 TABLET | Refills: 3 | Status: SHIPPED | OUTPATIENT
Start: 2021-04-05

## 2021-09-13 ENCOUNTER — TELEPHONE (OUTPATIENT)
Dept: FAMILY MEDICINE CLINIC | Facility: CLINIC | Age: 61
End: 2021-09-13

## 2021-10-25 NOTE — TELEPHONE ENCOUNTER
10/25/21 3:42 PM     Thank you for your request  Your request has been received, reviewed, and the patient chart updated  The PCP has successfully been removed with a patient attribution note  This message will now be completed      Thank you  Lian Vaca

## 2022-10-09 ENCOUNTER — HOSPITAL ENCOUNTER (OUTPATIENT)
Dept: RADIOLOGY | Facility: HOSPITAL | Age: 62
Discharge: HOME | End: 2022-10-09
Payer: COMMERCIAL

## 2022-10-09 DIAGNOSIS — R25.1 TREMORS OF NERVOUS SYSTEM: ICD-10-CM

## 2022-10-09 PROCEDURE — G1004 CDSM NDSC: HCPCS

## 2022-10-09 PROCEDURE — 70551 MRI BRAIN STEM W/O DYE: CPT

## 2022-11-02 ENCOUNTER — TELEPHONE (OUTPATIENT)
Dept: GASTROENTEROLOGY | Facility: CLINIC | Age: 62
End: 2022-11-02

## 2022-11-02 NOTE — TELEPHONE ENCOUNTER
Patient's Kimi Kory for colonoscopy is scanned in his chart    Please call him to schedule a colonoscopy

## 2022-11-02 NOTE — TELEPHONE ENCOUNTER
11/02/22  Screened by: Afia Thompson    [facility]erring Provider [facility]    Pre- Screening: There is no height or weight on file to calculate BMI  Has patient been referred for a routine screening Colonoscopy? yes  Is the patient between 39-70 years old? yes      Previous Colonoscopy yes   If yes:    Date: 2017? Facility:     Reason:       SCHEDULING STAFF: If the patient is between 45yrs-49yrs, please advise patient to confirm benefits/coverage with their insurance company for a routine screening colonoscopy, some insurance carriers will only cover at [address] or older  If the patient is over 66years old, please schedule an office visit  Does the patient want to see a Gastroenterologist prior to their procedure OR are they having any GI symptoms? no    Has the patient been hospitalized or had abdominal surgery in the past 6 months? no    Does the patient use supplemental oxygen? no    Does the patient take Coumadin, Lovenox, Plavix, Elliquis, Xarelto, or other blood thinning medication? no    Has the patient had a stroke, cardiac event, or stent placed in the past year? no    SCHEDULING STAFF: If patient answers NO to above questions, then schedule procedure  If patient answers YES to above questions, then schedule office appointment  If patient is between 45yrs - 49yrs, please advise patient that we will have to confirm benefits & coverage with their insurance company for a routine screening colonoscopy      PATIENT PASSED OA  [facility] PAPERWORK IN CHART

## 2022-11-07 ENCOUNTER — TELEPHONE (OUTPATIENT)
Dept: GASTROENTEROLOGY | Facility: CLINIC | Age: 62
End: 2022-11-07

## 2022-12-18 ENCOUNTER — HOSPITAL ENCOUNTER (EMERGENCY)
Facility: HOSPITAL | Age: 62
Discharge: HOME | End: 2022-12-18
Attending: EMERGENCY MEDICINE

## 2022-12-18 VITALS
WEIGHT: 222 LBS | HEART RATE: 68 BPM | HEIGHT: 72 IN | SYSTOLIC BLOOD PRESSURE: 101 MMHG | BODY MASS INDEX: 30.07 KG/M2 | DIASTOLIC BLOOD PRESSURE: 87 MMHG | TEMPERATURE: 97.6 F | OXYGEN SATURATION: 96 % | RESPIRATION RATE: 20 BRPM

## 2022-12-18 DIAGNOSIS — F41.9 ANXIETY: Primary | ICD-10-CM

## 2022-12-18 DIAGNOSIS — L29.9 PRURITIC DERMATITIS: ICD-10-CM

## 2022-12-18 LAB
ALBUMIN SERPL BCP-MCNC: 4 G/DL (ref 3.5–5)
ALP SERPL-CCNC: 95 U/L (ref 46–116)
ALT SERPL W P-5'-P-CCNC: 17 U/L (ref 12–78)
ANION GAP SERPL CALCULATED.3IONS-SCNC: 11 MMOL/L (ref 4–13)
AST SERPL W P-5'-P-CCNC: 22 U/L (ref 5–45)
ATRIAL RATE: 86 BPM
BASOPHILS # BLD AUTO: 0.06 THOUSANDS/ÂΜL (ref 0–0.1)
BASOPHILS NFR BLD AUTO: 1 % (ref 0–1)
BILIRUB SERPL-MCNC: 1 MG/DL (ref 0.2–1)
BUN SERPL-MCNC: 10 MG/DL (ref 5–25)
CALCIUM SERPL-MCNC: 9.5 MG/DL (ref 8.3–10.1)
CHLORIDE SERPL-SCNC: 106 MMOL/L (ref 96–108)
CO2 SERPL-SCNC: 23 MMOL/L (ref 21–32)
CREAT SERPL-MCNC: 0.73 MG/DL (ref 0.6–1.3)
EOSINOPHIL # BLD AUTO: 0.12 THOUSAND/ÂΜL (ref 0–0.61)
EOSINOPHIL NFR BLD AUTO: 2 % (ref 0–6)
ERYTHROCYTE [DISTWIDTH] IN BLOOD BY AUTOMATED COUNT: 12.1 % (ref 11.6–15.1)
GFR SERPL CREATININE-BSD FRML MDRD: 98 ML/MIN/1.73SQ M
GLUCOSE SERPL-MCNC: 121 MG/DL (ref 65–140)
HCT VFR BLD AUTO: 49.2 % (ref 36.5–49.3)
HGB BLD-MCNC: 16.9 G/DL (ref 12–17)
IMM GRANULOCYTES # BLD AUTO: 0.02 THOUSAND/UL (ref 0–0.2)
IMM GRANULOCYTES NFR BLD AUTO: 0 % (ref 0–2)
LYMPHOCYTES # BLD AUTO: 1.62 THOUSANDS/ÂΜL (ref 0.6–4.47)
LYMPHOCYTES NFR BLD AUTO: 25 % (ref 14–44)
MCH RBC QN AUTO: 30.4 PG (ref 26.8–34.3)
MCHC RBC AUTO-ENTMCNC: 34.3 G/DL (ref 31.4–37.4)
MCV RBC AUTO: 89 FL (ref 82–98)
MONOCYTES # BLD AUTO: 0.44 THOUSAND/ÂΜL (ref 0.17–1.22)
MONOCYTES NFR BLD AUTO: 7 % (ref 4–12)
NEUTROPHILS # BLD AUTO: 4.32 THOUSANDS/ÂΜL (ref 1.85–7.62)
NEUTS SEG NFR BLD AUTO: 65 % (ref 43–75)
NRBC BLD AUTO-RTO: 0 /100 WBCS
P AXIS: 35 DEGREES
PLATELET # BLD AUTO: 264 THOUSANDS/UL (ref 149–390)
PMV BLD AUTO: 9 FL (ref 8.9–12.7)
POTASSIUM SERPL-SCNC: 4.5 MMOL/L (ref 3.5–5.3)
PR INTERVAL: 150 MS
PROT SERPL-MCNC: 7.9 G/DL (ref 6.4–8.4)
QRS AXIS: -15 DEGREES
QRSD INTERVAL: 84 MS
QT INTERVAL: 356 MS
QTC INTERVAL: 426 MS
RBC # BLD AUTO: 5.56 MILLION/UL (ref 3.88–5.62)
SODIUM SERPL-SCNC: 140 MMOL/L (ref 135–147)
T WAVE AXIS: -3 DEGREES
TSH SERPL DL<=0.05 MIU/L-ACNC: 1.1 UIU/ML (ref 0.45–4.5)
VENTRICULAR RATE: 86 BPM
WBC # BLD AUTO: 6.58 THOUSAND/UL (ref 4.31–10.16)

## 2022-12-18 RX ORDER — HYDROXYZINE HYDROCHLORIDE 25 MG/1
25 TABLET, FILM COATED ORAL EVERY 6 HOURS
Qty: 12 TABLET | Refills: 0 | Status: SHIPPED | OUTPATIENT
Start: 2022-12-18

## 2022-12-18 RX ORDER — HYDROXYZINE HYDROCHLORIDE 25 MG/1
25 TABLET, FILM COATED ORAL ONCE
Status: COMPLETED | OUTPATIENT
Start: 2022-12-18 | End: 2022-12-18

## 2022-12-18 RX ADMIN — HYDROXYZINE HYDROCHLORIDE 25 MG: 25 TABLET, FILM COATED ORAL at 13:09

## 2022-12-18 NOTE — ED NOTES
Crisis met with patient to discuss with patient and his wife options for further treatment  Patient has been experiencing depression and anxiety for the last year and was diagnosed with Parkinsons Thursday  Patient also in outpatient treatment with psychiatry at the [place]  Patient reports some occasional suicidal ideation, but denies any plan or intent  Denies HI, AVH or paranoia  Discussed with patient and his wife about different treatment options available including inpatient treatment, PHP and IOP  Patient and his wife are interested in more information on PHP and IOP program  Provided information on [facility] and [facility]  Patient will discuss further with his therapist at the [place] on Wednesday

## 2022-12-18 NOTE — DISCHARGE INSTRUCTIONS
Medication was sent to your pharmacy  Please only take as needed and prescribed for itchiness and anxiety  Follow up with your psychiatrist and neurologist as scheduled

## 2022-12-18 NOTE — ED PROVIDER NOTES
History  Chief Complaint   Patient presents with   • Depression     Pt with history of depression and anxiety for a year  Past Thursday pt was dx with jose miguel at the [address] [place] [address]  Now for a week pt has been more agitated and can't settle  Pt wife wants to see if there is something going on her   77-year-old male history of hypertension, CAD, anxiety and depression, recently diagnosed with Parkinson's , presenting due to worsening anxiety and depression  States on Thursday he was diagnosed with Parkinson's through his neurologist and he receives medical care via the [address] [place] [address] since then he has had increase in his symptoms that include agitation and anxiety and depression  States he has been taking all of his medication as prescribed and has not yet started the Parkinson's medications nor has he had any adjustments to his regularly prescribed medication  Denies any drug or alcohol use  Denies any suicidal plan or attempt and denies any hallucinations  States he has had areas of itchiness on his torso and arms as well and has been scratching his arms frequently  Denies any new detergent or known allergic contacts  Denies any fevers, chills, chest pain or dyspnea, palpitations, urinary symptoms  Prior to Admission Medications   Prescriptions Last Dose Informant Patient Reported? Taking?    ARIPiprazole (ABILIFY) 2 mg tablet   No No   Sig: TAKE 1 TABLET BY MOUTH EVERY DAY   SOOLANTRA 1 % CREA   Yes No   Vitamin D, Cholecalciferol, 1000 units TABS   No No   Sig: Take 1 tablet (1,000 Units total) by mouth daily   aspirin 81 mg CHEW   Yes No   Sig: Chew 81 mg   atorvastatin (LIPITOR) 40 mg tablet   No No   Sig: TAKE 1 TABLET BY MOUTH EVERYDAY AT BEDTIME   citalopram (CeleXA) 40 mg tablet   No No   Sig: TAKE 1 TABLET BY MOUTH  DAILY   doxycycline (ORACEA) 40 MG capsule   No No   Sig: Take 1 capsule (40 mg total) by mouth every morning   lisinopril (ZESTRIL) 5 mg tablet   No No   Sig: TAKE 1 TABLET BY MOUTH  DAILY   traZODone (DESYREL) 150 mg tablet   No No   Sig: Take 1 tablet (150 mg total) by mouth daily at bedtime      Facility-Administered Medications: None       Past Medical History:   Diagnosis Date   • Hyperlipidemia    • Hypertension    • Myocardial infarction ([facility] [address] )        Past Surgical History:   Procedure Laterality Date   • ANGIOPLASTY     • ANKLE FUSION     • CARDIAC SURGERY      stent   • CARPAL TUNNEL RELEASE Bilateral    • COLONOSCOPY     • KNEE ARTHROSCOPY Bilateral    • MENISCECTOMY Bilateral    • MN COLONOSCOPY FLX DX W/COLLJ SPEC WHEN PFRMD N/A 2019    Procedure: COLONOSCOPY with polypectomies;  Surgeon: Herson Ba DO;  Location:  MAIN OR;  Service: Gastroenterology   • MN SHLDR ARTHROSCOP,SURG,W/ROTAT CUFF REPR Right 2020    Procedure: SHOULDER ARTHROSCOPIC ROTATOR CUFF REPAIR; SAD, BICEPS TENODESIS;  Surgeon: Zulema Soliman MD;  Location: [facility]  MAIN OR;  Service: Orthopedics   • ROTATOR CUFF REPAIR Left    • TONSILLECTOMY         Family History   Problem Relation Age of Onset   • Breast cancer Mother    • COPD Father    • Alcohol abuse Neg Hx    • Substance Abuse Neg Hx    • Mental illness Neg Hx      I have reviewed and agree with the history as documented  E-Cigarette/Vaping   • E-Cigarette Use Never User      E-Cigarette/Vaping Substances     Social History     Tobacco Use   • Smoking status: Former     Types: Cigarettes     Quit date: 2017     Years since quittin 6   • Smokeless tobacco: Never   Vaping Use   • Vaping Use: Never used   Substance Use Topics   • Alcohol use: Yes     Alcohol/week: 1 0 standard drink     Types: 1 Cans of beer per week     Comment: Socially   • Drug use: Never       Review of Systems   Constitutional: Negative for fatigue and fever  HENT: Negative for congestion and trouble swallowing  Eyes: Negative for visual disturbance  Respiratory: Negative for cough, chest tightness and shortness of breath  Cardiovascular: Negative for chest pain  Gastrointestinal: Negative for abdominal pain, diarrhea, nausea and vomiting  Genitourinary: Negative for flank pain  Musculoskeletal: Negative for back pain and gait problem  Skin: Negative for rash and wound  pruritis   Neurological: Negative for syncope and headaches  Psychiatric/Behavioral: Positive for dysphoric mood  Negative for agitation  The patient is nervous/anxious  All other systems reviewed and are negative  Physical Exam  Physical Exam  Vitals and nursing note reviewed  Constitutional:       Appearance: He is well-developed  He is not diaphoretic  HENT:      Head: Normocephalic and atraumatic  Right Ear: External ear normal       Left Ear: External ear normal    Eyes:      General:         Right eye: No discharge  Left eye: No discharge  Conjunctiva/sclera: Conjunctivae normal    Neck:      Vascular: No JVD  Trachea: No tracheal deviation  Cardiovascular:      Rate and Rhythm: Normal rate and regular rhythm  Heart sounds: Normal heart sounds  Pulmonary:      Effort: Pulmonary effort is normal       Breath sounds: Normal breath sounds  No wheezing  Abdominal:      General: There is no distension  Musculoskeletal:         General: Normal range of motion  Cervical back: Normal range of motion  Skin:     General: Skin is warm and dry  Neurological:      Mental Status: He is alert and oriented to person, place, and time  Psychiatric:         Mood and Affect: Mood is anxious           Speech: Speech normal          Behavior: Behavior normal          Vital Signs  ED Triage Vitals [12/18/22 1233]   Temperature Pulse Respirations Blood Pressure SpO2   97 6 °F (36 4 °C) (!) 111 20 101/87 96 %      Temp Source Heart Rate Source Patient Position - Orthostatic VS BP Location FiO2 (%)   Temporal Monitor Sitting Left arm --      Pain Score       No Pain           Vitals:    12/18/22 1233 12/18/22 1417   BP: 101/87    Pulse: (!) 111 68   Patient Position - Orthostatic VS: Sitting          Visual Acuity  Visual Acuity    Flowsheet Row Most Recent Value   L Pupil Size (mm) 3   R Pupil Size (mm) 3          ED Medications  Medications   hydrOXYzine HCL (ATARAX) tablet 25 mg (25 mg Oral Given 12/18/22 1309)       Diagnostic Studies  Results Reviewed     Procedure Component Value Units Date/Time    TSH, 3rd generation with Free T4 reflex [[id number]]  (Normal) Collected: 12/18/22 1316    Lab Status: Final result Specimen: Blood from Hand, Right Updated: 12/18/22 1404     TSH 3RD GENERATON 1 100 uIU/mL     Narrative:      Patients undergoing fluorescein dye angiography may retain small amounts of fluorescein in the body for 48-72 hours post procedure  Samples containing fluorescein can produce falsely depressed TSH values  If the patient had this procedure,a specimen should be resubmitted post fluorescein clearance        Comprehensive metabolic panel [[id number]] Collected: 12/18/22 1316    Lab Status: Final result Specimen: Blood from Hand, Right Updated: 12/18/22 1355     Sodium 140 mmol/L      Potassium 4 5 mmol/L      Chloride 106 mmol/L      CO2 23 mmol/L      ANION GAP 11 mmol/L      BUN 10 mg/dL      Creatinine 0 73 mg/dL      Glucose 121 mg/dL      Calcium 9 5 mg/dL      AST 22 U/L      ALT 17 U/L      Alkaline Phosphatase 95 U/L      Total Protein 7 9 g/dL      Albumin 4 0 g/dL      Total Bilirubin 1 00 mg/dL      eGFR 98 ml/min/1 73sq m     Narrative:      [facility] guidelines for Chronic Kidney Disease (CKD):   •  Stage 1 with normal or high GFR (GFR > 90 mL/min/1 73 square meters)  •  Stage 2 Mild CKD (GFR = 60-89 mL/min/1 73 square meters)  •  Stage 3A Moderate CKD (GFR = 45-59 mL/min/1 73 square meters)  •  Stage 3B Moderate CKD (GFR = 30-44 mL/min/1 73 square meters)  •  Stage 4 Severe CKD (GFR = 15-29 mL/min/1 73 square meters)  •  Stage 5 End Stage CKD (GFR <15 mL/min/1 73 square meters)  Note: GFR calculation is accurate only with a steady state creatinine    CBC and differential [[id number]] Collected: 12/18/22 1316    Lab Status: Final result Specimen: Blood from Hand, Right Updated: 12/18/22 1325     WBC 6 58 Thousand/uL      RBC 5 56 Million/uL      Hemoglobin 16 9 g/dL      Hematocrit 49 2 %      MCV 89 fL      MCH 30 4 pg      MCHC 34 3 g/dL      RDW 12 1 %      MPV 9 0 fL      Platelets 623 Thousands/uL      nRBC 0 /100 WBCs      Neutrophils Relative 65 %      Immat GRANS % 0 %      Lymphocytes Relative 25 %      Monocytes Relative 7 %      Eosinophils Relative 2 %      Basophils Relative 1 %      Neutrophils Absolute 4 32 Thousands/µL      Immature Grans Absolute 0 02 Thousand/uL      Lymphocytes Absolute 1 62 Thousands/µL      Monocytes Absolute 0 44 Thousand/µL      Eosinophils Absolute 0 12 Thousand/µL      Basophils Absolute 0 06 Thousands/µL                  No orders to display              Procedures  Procedures         ED Course  ED Course as of 12/20/22 1327   Sun Dec 18, 2022   1351 Itchiness improved w atarax  HR also improved to 80s   1351 Crisis spoke w patient and wife  Not interested in inpatient treatment at this time  Appt with the  psychiatrist on Wednesday  Outpatient resources provided  MDM  Number of Diagnoses or Management Options  Anxiety  Pruritic dermatitis  Diagnosis management comments: Improved anxiety and pruritis with atarax  Short course sent to pharmacy  Seen by crisis and confirmed patient has close follow up with psych  Suspect anxiety exacerbation due to recent diagnosis of parkinsons  Follow up w psych and pcp  No SI or HI at this time  Return precautions advised         Disposition  Final diagnoses:   Anxiety   Pruritic dermatitis     Time reflects when diagnosis was documented in both MDM as applicable and the Disposition within this note     Time User Action Codes Description Comment 12/18/2022  2:06 PM Mikayla Half Add [F41 9] Anxiety     12/18/2022  2:06 PM Mikayla Half Add [L29 9] Pruritic dermatitis       ED Disposition     ED Disposition   Discharge    Condition   Stable    Date/Time   Sun Dec 18, 2022  2:07 PM    Zia Champion discharge to home/self care  Follow-up Information     Follow up With Specialties Details Why Contact Info Additional [address]   [address], [address] [facility] [address]  [address], [address], [address] [place], [place], [address]          Discharge Medication List as of 12/18/2022  2:13 PM      START taking these medications    Details   hydrOXYzine HCL (ATARAX) 25 mg tablet Take 1 tablet (25 mg total) by mouth every 6 (six) hours, Starting Sun 12/18/2022, Normal         CONTINUE these medications which have NOT CHANGED    Details   ARIPiprazole (ABILIFY) 2 mg tablet TAKE 1 TABLET BY MOUTH EVERY DAY, Normal      aspirin 81 mg CHEW Chew 81 mg, Historical Med      atorvastatin (LIPITOR) 40 mg tablet TAKE 1 TABLET BY MOUTH EVERYDAY AT BEDTIME, Normal      citalopram (CeleXA) 40 mg tablet TAKE 1 TABLET BY MOUTH  DAILY, Normal      doxycycline (ORACEA) 40 MG capsule Take 1 capsule (40 mg total) by mouth every morning, Starting Mon 3/16/2020, Normal      lisinopril (ZESTRIL) 5 mg tablet TAKE 1 TABLET BY MOUTH  DAILY, Normal      SOOLANTRA 1 % CREA Starting Mon 6/10/2019, Historical Med      traZODone (DESYREL) 150 mg tablet Take 1 tablet (150 mg total) by mouth daily at bedtime, Starting Mon 2/22/2021, Normal      Vitamin D, Cholecalciferol, 1000 units TABS Take 1 tablet (1,000 Units total) by mouth daily, Starting Fri 2/22/2019, Normal             No discharge procedures on file      PDMP Review       Value Time User    PDMP Reviewed  Yes 2/7/2020 10:36 AM Con Tomas PA-C ED Provider  Electronically Signed by           Kaylan Simon DO  12/20/22 7558

## 2023-01-04 ENCOUNTER — HOSPITAL ENCOUNTER (EMERGENCY)
Facility: HOSPITAL | Age: 63
End: 2023-01-05
Attending: EMERGENCY MEDICINE

## 2023-01-04 DIAGNOSIS — R45.851 DEPRESSION WITH SUICIDAL IDEATION: Primary | ICD-10-CM

## 2023-01-04 DIAGNOSIS — F32.A DEPRESSION WITH SUICIDAL IDEATION: Primary | ICD-10-CM

## 2023-01-04 LAB
ALBUMIN SERPL BCP-MCNC: 3.7 G/DL (ref 3.5–5)
ALP SERPL-CCNC: 88 U/L (ref 46–116)
ALT SERPL W P-5'-P-CCNC: 39 U/L (ref 12–78)
AMPHETAMINES SERPL QL SCN: NEGATIVE
ANION GAP SERPL CALCULATED.3IONS-SCNC: 8 MMOL/L (ref 4–13)
AST SERPL W P-5'-P-CCNC: 26 U/L (ref 5–45)
BARBITURATES UR QL: NEGATIVE
BASOPHILS # BLD AUTO: 0.06 THOUSANDS/ÂΜL (ref 0–0.1)
BASOPHILS NFR BLD AUTO: 1 % (ref 0–1)
BENZODIAZ UR QL: POSITIVE
BILIRUB SERPL-MCNC: 0.9 MG/DL (ref 0.2–1)
BUN SERPL-MCNC: 10 MG/DL (ref 5–25)
CALCIUM SERPL-MCNC: 9.5 MG/DL (ref 8.3–10.1)
CHLORIDE SERPL-SCNC: 106 MMOL/L (ref 96–108)
CO2 SERPL-SCNC: 26 MMOL/L (ref 21–32)
COCAINE UR QL: NEGATIVE
CREAT SERPL-MCNC: 0.68 MG/DL (ref 0.6–1.3)
EOSINOPHIL # BLD AUTO: 0.2 THOUSAND/ÂΜL (ref 0–0.61)
EOSINOPHIL NFR BLD AUTO: 3 % (ref 0–6)
ERYTHROCYTE [DISTWIDTH] IN BLOOD BY AUTOMATED COUNT: 12.1 % (ref 11.6–15.1)
ETHANOL EXG-MCNC: 0 MG/DL
FLUAV RNA RESP QL NAA+PROBE: NEGATIVE
FLUBV RNA RESP QL NAA+PROBE: NEGATIVE
GFR SERPL CREATININE-BSD FRML MDRD: 100 ML/MIN/1.73SQ M
GLUCOSE SERPL-MCNC: 117 MG/DL (ref 65–140)
HCT VFR BLD AUTO: 50.5 % (ref 36.5–49.3)
HGB BLD-MCNC: 17.1 G/DL (ref 12–17)
IMM GRANULOCYTES # BLD AUTO: 0.04 THOUSAND/UL (ref 0–0.2)
IMM GRANULOCYTES NFR BLD AUTO: 1 % (ref 0–2)
LYMPHOCYTES # BLD AUTO: 2.12 THOUSANDS/ÂΜL (ref 0.6–4.47)
LYMPHOCYTES NFR BLD AUTO: 26 % (ref 14–44)
MCH RBC QN AUTO: 29.4 PG (ref 26.8–34.3)
MCHC RBC AUTO-ENTMCNC: 33.9 G/DL (ref 31.4–37.4)
MCV RBC AUTO: 87 FL (ref 82–98)
METHADONE UR QL: NEGATIVE
MONOCYTES # BLD AUTO: 0.64 THOUSAND/ÂΜL (ref 0.17–1.22)
MONOCYTES NFR BLD AUTO: 8 % (ref 4–12)
NEUTROPHILS # BLD AUTO: 4.99 THOUSANDS/ÂΜL (ref 1.85–7.62)
NEUTS SEG NFR BLD AUTO: 61 % (ref 43–75)
NRBC BLD AUTO-RTO: 0 /100 WBCS
OPIATES UR QL SCN: NEGATIVE
OXYCODONE+OXYMORPHONE UR QL SCN: NEGATIVE
PCP UR QL: NEGATIVE
PLATELET # BLD AUTO: 283 THOUSANDS/UL (ref 149–390)
PMV BLD AUTO: 8.8 FL (ref 8.9–12.7)
POTASSIUM SERPL-SCNC: 4.2 MMOL/L (ref 3.5–5.3)
PROT SERPL-MCNC: 7.2 G/DL (ref 6.4–8.4)
RBC # BLD AUTO: 5.82 MILLION/UL (ref 3.88–5.62)
RSV RNA RESP QL NAA+PROBE: NEGATIVE
SARS-COV-2 RNA RESP QL NAA+PROBE: NEGATIVE
SODIUM SERPL-SCNC: 140 MMOL/L (ref 135–147)
THC UR QL: NEGATIVE
TSH SERPL DL<=0.05 MIU/L-ACNC: 2.09 UIU/ML (ref 0.45–4.5)
WBC # BLD AUTO: 8.05 THOUSAND/UL (ref 4.31–10.16)

## 2023-01-04 RX ORDER — BUPROPION HYDROCHLORIDE 150 MG/1
150 TABLET ORAL DAILY
Status: DISCONTINUED | OUTPATIENT
Start: 2023-01-05 | End: 2023-01-05 | Stop reason: HOSPADM

## 2023-01-04 RX ORDER — ASPIRIN 81 MG/1
81 TABLET, CHEWABLE ORAL DAILY
Status: DISCONTINUED | OUTPATIENT
Start: 2023-01-05 | End: 2023-01-05 | Stop reason: HOSPADM

## 2023-01-04 RX ORDER — LORAZEPAM 0.5 MG/1
0.5 TABLET ORAL ONCE
Status: COMPLETED | OUTPATIENT
Start: 2023-01-04 | End: 2023-01-04

## 2023-01-04 RX ORDER — TRAZODONE HYDROCHLORIDE 50 MG/1
25 TABLET ORAL
Status: DISCONTINUED | OUTPATIENT
Start: 2023-01-04 | End: 2023-01-05 | Stop reason: HOSPADM

## 2023-01-04 RX ORDER — ATORVASTATIN CALCIUM 40 MG/1
40 TABLET, FILM COATED ORAL
Status: DISCONTINUED | OUTPATIENT
Start: 2023-01-04 | End: 2023-01-05 | Stop reason: HOSPADM

## 2023-01-04 RX ADMIN — ATORVASTATIN CALCIUM 40 MG: 40 TABLET, FILM COATED ORAL at 17:25

## 2023-01-04 RX ADMIN — LORAZEPAM 0.5 MG: 0.5 TABLET ORAL at 17:25

## 2023-01-04 RX ADMIN — CARBIDOPA AND LEVODOPA 0.5 TABLET: 25; 100 TABLET ORAL at 17:25

## 2023-01-04 NOTE — ED NOTES
57 y o male Patient presents to the ER with reports of having increased depression, anxiety, a new Parkinsons diagnosis and SI  Pt reported his depression has been increasing for many months and has found out he has the onset of Parkinson's  Pt reported he doesn't want to live anymore and has being having thoughts of SI but with no plans  Pt stated he cannot settle down and is very anxious and has anxiety  Pt stated he doesn't have any motivation to do any task and basically sits in his recliner until he goes to bed  Pt reported he has feelings of agitation and restlessness  Pt reported he is constantly exhausted and tired  Pt currently has outpatient services with the [facility]  Pt denies HI and A/V hallucinations  Pt denies any substance abuse and no legal issues  Pt feels he needs to go inpatient for treatment and will sign a 201

## 2023-01-04 NOTE — ED PROVIDER NOTES
History  Chief Complaint   Patient presents with   • Psychiatric Evaluation     Patient presents to the ER with reports of having increased depression, anxiety, a new Parkinsons diagnosis and SI  Patient is a 55-year-old male with a past medical history significant for hypertension, coronary artery disease, depression, anxiety, Parkinson's disease who presents with progressively worsening depression, anxiety and suicidal thoughts  Patient reports that over the last month he has been having worsening depression and anxiety despite multiple medication changes  He is now stating that he wants to "end it all"  He denies having a plan  No prior suicide attempts  He states that he is afraid that if he does not get help, he will get worse  Denies any homicidal ideations, auditory visual hallucinations  Prior to Admission Medications   Prescriptions Last Dose Informant Patient Reported? Taking?    ARIPiprazole (ABILIFY) 2 mg tablet   No No   Sig: TAKE 1 TABLET BY MOUTH EVERY DAY   SOOLANTRA 1 % CREA   Yes No   Vitamin D, Cholecalciferol, 1000 units TABS   No No   Sig: Take 1 tablet (1,000 Units total) by mouth daily   aspirin 81 mg CHEW   Yes No   Sig: Chew 81 mg   atorvastatin (LIPITOR) 40 mg tablet   No No   Sig: TAKE 1 TABLET BY MOUTH EVERYDAY AT BEDTIME   citalopram (CeleXA) 40 mg tablet   No No   Sig: TAKE 1 TABLET BY MOUTH  DAILY   doxycycline (ORACEA) 40 MG capsule   No No   Sig: Take 1 capsule (40 mg total) by mouth every morning   hydrOXYzine HCL (ATARAX) 25 mg tablet   No No   Sig: Take 1 tablet (25 mg total) by mouth every 6 (six) hours   lisinopril (ZESTRIL) 5 mg tablet   No No   Sig: TAKE 1 TABLET BY MOUTH  DAILY   polyethylene glycol (GOLYTELY) 4000 mL solution   No No   Sig: Take 4,000 mL by mouth once for 1 dose   traZODone (DESYREL) 150 mg tablet   No No   Sig: Take 1 tablet (150 mg total) by mouth daily at bedtime      Facility-Administered Medications: None       Past Medical History: Diagnosis Date   • Hyperlipidemia    • Hypertension    • Myocardial infarction [facility])    • Parkinson's disease ([facility] [address] )        Past Surgical History:   Procedure Laterality Date   • ANGIOPLASTY     • ANKLE FUSION     • CARDIAC SURGERY      stent   • CARPAL TUNNEL RELEASE Bilateral    • COLONOSCOPY     • KNEE ARTHROSCOPY Bilateral    • MENISCECTOMY Bilateral    • UT COLONOSCOPY FLX DX W/COLLJ SPEC WHEN PFRMD N/A 2019    Procedure: COLONOSCOPY with polypectomies;  Surgeon: Je Morton DO;  Location: [facility];  Service: Gastroenterology   • UT SURGICAL ARTHROSCOPY SHOULDER W/ROTATOR CUFF RPR Right 2020    Procedure: SHOULDER ARTHROSCOPIC ROTATOR CUFF REPAIR; SAD, BICEPS TENODESIS;  Surgeon: Beau iCsse MD;  Location: [facility]  MAIN OR;  Service: Orthopedics   • ROTATOR CUFF REPAIR Left    • TONSILLECTOMY         Family History   Problem Relation Age of Onset   • Breast cancer Mother    • COPD Father    • Alcohol abuse Neg Hx    • Substance Abuse Neg Hx    • Mental illness Neg Hx      I have reviewed and agree with the history as documented  E-Cigarette/Vaping   • E-Cigarette Use Never User      E-Cigarette/Vaping Substances     Social History     Tobacco Use   • Smoking status: Former     Types: Cigarettes     Quit date: 2017     Years since quittin 6   • Smokeless tobacco: Never   Vaping Use   • Vaping Use: Never used   Substance Use Topics   • Alcohol use: Yes     Alcohol/week: 1 0 standard drink     Types: 1 Cans of beer per week     Comment: Socially   • Drug use: Never       Review of Systems   Constitutional: Negative for chills and fever  HENT: Negative for congestion and rhinorrhea  Eyes: Negative for photophobia and visual disturbance  Respiratory: Negative for cough and shortness of breath  Cardiovascular: Negative for chest pain and palpitations  Gastrointestinal: Negative for abdominal pain, constipation, diarrhea, nausea and vomiting     Genitourinary: Negative for dysuria, flank pain and hematuria  Musculoskeletal: Negative for back pain and neck pain  Skin: Negative for color change and pallor  Neurological: Negative for dizziness, weakness, light-headedness, numbness and headaches  Psychiatric/Behavioral: Positive for suicidal ideas  Physical Exam  Physical Exam  Vitals and nursing note reviewed  Constitutional:       General: He is not in acute distress  Appearance: Normal appearance  He is not ill-appearing, toxic-appearing or diaphoretic  HENT:      Head: Normocephalic and atraumatic  Mouth/Throat:      Mouth: Mucous membranes are moist    Eyes:      Conjunctiva/sclera: Conjunctivae normal       Pupils: Pupils are equal, round, and reactive to light  Cardiovascular:      Rate and Rhythm: Normal rate and regular rhythm  Pulses: Normal pulses  Heart sounds: Normal heart sounds  No murmur heard  Pulmonary:      Effort: Pulmonary effort is normal  No respiratory distress  Breath sounds: Normal breath sounds  No stridor  No wheezing, rhonchi or rales  Chest:      Chest wall: No tenderness  Abdominal:      General: Bowel sounds are normal  There is no distension  Palpations: Abdomen is soft  Tenderness: There is no abdominal tenderness  There is no guarding or rebound  Musculoskeletal:      Cervical back: Neck supple  Right lower leg: No edema  Left lower leg: No edema  Skin:     General: Skin is warm and dry  Neurological:      General: No focal deficit present  Mental Status: He is alert and oriented to person, place, and time  Mental status is at baseline  Psychiatric:         Mood and Affect: Mood is depressed  Affect is blunt and flat  Speech: Speech normal          Behavior: Behavior is cooperative  Thought Content: Thought content includes suicidal ideation  Thought content does not include homicidal ideation  Thought content does not include homicidal or suicidal plan  Vital Signs  ED Triage Vitals   Temperature Pulse Respirations Blood Pressure SpO2   01/04/23 1344 01/04/23 1224 01/04/23 1224 01/04/23 1224 01/04/23 1224   98 2 °F (36 8 °C) (!) 106 19 115/79 97 %      Temp Source Heart Rate Source Patient Position - Orthostatic VS BP Location FiO2 (%)   01/04/23 1344 01/04/23 1224 01/04/23 1224 01/04/23 1224 --   Temporal Monitor Sitting Right arm       Pain Score       01/04/23 1224       No Pain           Vitals:    01/04/23 1224   BP: 115/79   Pulse: (!) 106   Patient Position - Orthostatic VS: Sitting         Visual Acuity      ED Medications  Medications   aspirin chewable tablet 81 mg (has no administration in time range)   buPROPion (WELLBUTRIN XL) 24 hr tablet 150 mg (has no administration in time range)   atorvastatin (LIPITOR) tablet 40 mg (40 mg Oral Given 1/4/23 1725)   traZODone (DESYREL) tablet 25 mg (has no administration in time range)   carbidopa-levodopa (SINEMET)  mg per tablet 0 5 tablet (has no administration in time range)   LORazepam (ATIVAN) tablet 0 5 mg (0 5 mg Oral Given 1/4/23 1725)   carbidopa-levodopa (SINEMET)  mg per tablet 0 5 tablet (0 5 tablets Oral Given 1/4/23 1725)       Diagnostic Studies  Results Reviewed     Procedure Component Value Units Date/Time    FLU/RSV/COVID - if FLU/RSV clinically relevant [[id number]]  (Normal) Collected: 01/04/23 1403    Lab Status: Final result Specimen: Nares from Nose Updated: 01/04/23 1447     SARS-CoV-2 Negative     INFLUENZA A PCR Negative     INFLUENZA B PCR Negative     RSV PCR Negative    Narrative:      FOR PEDIATRIC PATIENTS - copy/paste COVID Guidelines URL to browser: [email or web address] [email or web address]  ashx    SARS-CoV-2 assay is a Nucleic Acid Amplification assay intended for the  qualitative detection of nucleic acid from SARS-CoV-2 in nasopharyngeal  swabs  Results are for the presumptive identification of SARS-CoV-2 RNA      Positive results are indicative of infection with SARS-CoV-2, the virus  causing COVID-19, but do not rule out bacterial infection or co-infection  with other viruses  Laboratories within the [place] and its  territories are required to report all positive results to the appropriate  public health authorities  Negative results do not preclude SARS-CoV-2  infection and should not be used as the sole basis for treatment or other  patient management decisions  Negative results must be combined with  clinical observations, patient history, and epidemiological information  This test has not been FDA cleared or approved  This test has been authorized by FDA under an Emergency Use Authorization  (EUA)  This test is only authorized for the duration of time the  declaration that circumstances exist justifying the authorization of the  emergency use of an in vitro diagnostic tests for detection of SARS-CoV-2  virus and/or diagnosis of COVID-19 infection under section 564(b)(1) of  the Act, 21 U  S C  435JIP-2(I)(3), unless the authorization is terminated  or revoked sooner  The test has been validated but independent review by FDA  and CLIA is pending  Test performed using [employer] Gene[employer]pert: This RT-PCR assay targets N2,  a region unique to SARS-CoV-2  A conserved region in the E-gene was chosen  for pan-Sarbecovirus detection which includes SARS-CoV-2  According to CMS-2020-01-R, this platform meets the definition of high-throughput technology  Rapid drug screen, urine [[id number]]  (Abnormal) Collected: 01/04/23 1240    Lab Status: Final result Specimen: Urine, Clean Catch Updated: 01/04/23 1333     Amph/Meth UR Negative     Barbiturate Ur Negative     Benzodiazepine Urine Positive     Cocaine Urine Negative     Methadone Urine Negative     Opiate Urine Negative     PCP Ur Negative     THC Urine Negative     Oxycodone Urine Negative    Narrative:      Presumptive report   If requested, specimen will be sent to reference lab for confirmation  FOR MEDICAL PURPOSES ONLY  IF CONFIRMATION NEEDED PLEASE CONTACT THE LAB WITHIN 5 DAYS  Drug Screen Cutoff Levels:  AMPHETAMINE/METHAMPHETAMINES  1000 ng/mL  BARBITURATES     200 ng/mL  BENZODIAZEPINES     200 ng/mL  COCAINE      300 ng/mL  METHADONE      300 ng/mL  OPIATES      300 ng/mL  PHENCYCLIDINE     25 ng/mL  THC       50 ng/mL  OXYCODONE      100 ng/mL    TSH [[id number]]  (Normal) Collected: 01/04/23 1238    Lab Status: Final result Specimen: Blood from Hand, Left Updated: 01/04/23 1328     TSH 3RD GENERATON 2 090 uIU/mL     Narrative:      Patients undergoing fluorescein dye angiography may retain small amounts of fluorescein in the body for 48-72 hours post procedure  Samples containing fluorescein can produce falsely depressed TSH values  If the patient had this procedure,a specimen should be resubmitted post fluorescein clearance        Comprehensive metabolic panel [[id number]] Collected: 01/04/23 1238    Lab Status: Final result Specimen: Blood from Hand, Left Updated: 01/04/23 1321     Sodium 140 mmol/L      Potassium 4 2 mmol/L      Chloride 106 mmol/L      CO2 26 mmol/L      ANION GAP 8 mmol/L      BUN 10 mg/dL      Creatinine 0 68 mg/dL      Glucose 117 mg/dL      Calcium 9 5 mg/dL      AST 26 U/L      ALT 39 U/L      Alkaline Phosphatase 88 U/L      Total Protein 7 2 g/dL      Albumin 3 7 g/dL      Total Bilirubin 0 90 mg/dL      eGFR 100 ml/min/1 73sq m     Narrative:      National Kidney Disease Foundation guidelines for Chronic Kidney Disease (CKD):   •  Stage 1 with normal or high GFR (GFR > 90 mL/min/1 73 square meters)  •  Stage 2 Mild CKD (GFR = 60-89 mL/min/1 73 square meters)  •  Stage 3A Moderate CKD (GFR = 45-59 mL/min/1 73 square meters)  •  Stage 3B Moderate CKD (GFR = 30-44 mL/min/1 73 square meters)  •  Stage 4 Severe CKD (GFR = 15-29 mL/min/1 73 square meters)  •  Stage 5 End Stage CKD (GFR <15 mL/min/1 73 square meters)  Note: GFR calculation is accurate only with a steady state creatinine    UA w Reflex to Microscopic w Reflex to Culture [[id number]]     Lab Status: No result Specimen: Urine     CBC and differential [[id number]]  (Abnormal) Collected: 01/04/23 1238    Lab Status: Final result Specimen: Blood from Hand, Left Updated: 01/04/23 1250     WBC 8 05 Thousand/uL      RBC 5 82 Million/uL      Hemoglobin 17 1 g/dL      Hematocrit 50 5 %      MCV 87 fL      MCH 29 4 pg      MCHC 33 9 g/dL      RDW 12 1 %      MPV 8 8 fL      Platelets 295 Thousands/uL      nRBC 0 /100 WBCs      Neutrophils Relative 61 %      Immat GRANS % 1 %      Lymphocytes Relative 26 %      Monocytes Relative 8 %      Eosinophils Relative 3 %      Basophils Relative 1 %      Neutrophils Absolute 4 99 Thousands/µL      Immature Grans Absolute 0 04 Thousand/uL      Lymphocytes Absolute 2 12 Thousands/µL      Monocytes Absolute 0 64 Thousand/µL      Eosinophils Absolute 0 20 Thousand/µL      Basophils Absolute 0 06 Thousands/µL     POCT alcohol breath test [[id number]]  (Normal) Resulted: 01/04/23 1240    Lab Status: Final result Updated: 01/04/23 1240     EXTBreath Alcohol 0 00                 No orders to display              Procedures  ECG 12 Lead Documentation Only    Date/Time: 1/4/2023 1:16 PM  Performed by: Edwin Xiong DO  Authorized by: Edwin Xiong DO     Indications / Diagnosis:  Depression, SI  ECG reviewed by me, the ED Provider: yes    Patient location:  ED  Previous ECG:     Previous ECG:  Unavailable  Interpretation:     Interpretation: non-specific    Rate:     ECG rate:  105    ECG rate assessment: tachycardic    Rhythm:     Rhythm: sinus tachycardia    Ectopy:     Ectopy: none    QRS:     QRS axis:  Normal    QRS intervals:  Normal  Conduction:     Conduction: normal    ST segments:     ST segments:  Normal  T waves:     T waves: non-specific    Comments:      CLAIR 481             ED Course  ED Course as of 01/04/23 1945 Wed Jan 04, 2023   1645 201 signed Medical Decision Making    Assessment and plan:  47-year-old male presenting with depression, anxiety and suicidal ideations  Patient has no prior suicide attempts  He has no active plan of suicide, but is concerned that he will do something reckless if he does not seek help at this point  Will do geriatric lab work and discuss with crisis team for voluntary admission  Amount and/or Complexity of Data Reviewed  Labs: ordered  Disposition  Final diagnoses:   Depression with suicidal ideation     Time reflects when diagnosis was documented in both MDM as applicable and the Disposition within this note     Time User Action Codes Description Comment    1/4/2023  1:18 PM Dat MCCULLOUGH,  R45 [id number]] Depression with suicidal ideation       ED Disposition     ED Disposition   Transfer to [address] [place] [address]   --    Date/Time   Wed Jan 4, 2023  1:19 PM    Comment   Johnson Rosas has been medically cleared and is pending crisis evaluation  Follow-up Information    None         Patient's Medications   Discharge Prescriptions    No medications on file       No discharge procedures on file      PDMP Review       Value Time User    PDMP Reviewed  Yes 2/7/2020 10:36 AM Jimmei Monroe PA-C          ED Provider  Electronically Signed by           Tabitha Doll DO  01/04/23 1945

## 2023-01-05 VITALS
RESPIRATION RATE: 18 BRPM | BODY MASS INDEX: 30.16 KG/M2 | WEIGHT: 222.66 LBS | HEART RATE: 126 BPM | DIASTOLIC BLOOD PRESSURE: 83 MMHG | TEMPERATURE: 97.6 F | OXYGEN SATURATION: 96 % | HEIGHT: 72 IN | SYSTOLIC BLOOD PRESSURE: 114 MMHG

## 2023-01-05 RX ORDER — LORAZEPAM 1 MG/1
1 TABLET ORAL EVERY 6 HOURS PRN
Status: DISCONTINUED | OUTPATIENT
Start: 2023-01-05 | End: 2023-01-05 | Stop reason: HOSPADM

## 2023-01-05 RX ADMIN — LORAZEPAM 1 MG: 1 TABLET ORAL at 09:18

## 2023-01-05 RX ADMIN — CARBIDOPA AND LEVODOPA 0.5 TABLET: 25; 100 TABLET ORAL at 16:11

## 2023-01-05 RX ADMIN — ATORVASTATIN CALCIUM 40 MG: 40 TABLET, FILM COATED ORAL at 16:11

## 2023-01-05 RX ADMIN — LORAZEPAM 1 MG: 1 TABLET ORAL at 16:11

## 2023-01-05 RX ADMIN — BUPROPION HYDROCHLORIDE 150 MG: 150 TABLET, EXTENDED RELEASE ORAL at 09:18

## 2023-01-05 RX ADMIN — ASPIRIN 81 MG CHEWABLE TABLET 81 MG: 81 TABLET CHEWABLE at 09:18

## 2023-01-05 NOTE — EMTALA/ACUTE CARE TRANSFER
[facility] EMERGENCY DEPARTMENT  [address] [facility] [address]  Dept: [phone]      [facility] TRANSFER CONSENT    NAME Lily Miller DOB 1960                              MRN [id number]    I have been informed of my rights regarding examination, treatment, and transfer   by Dr Roxane Richard DO    Benefits: Specialized equipment and/or services available at the receiving facility (Include comment)________________________    Risks: Potential for delay in receiving treatment      Consent for Transfer:  I acknowledge that my medical condition has been evaluated and explained to me by the emergency department physician or other qualified medical person and/or my attending physician, who has recommended that I be transferred to the service of  Accepting Physician: Dr Harleen Petty at [facility] Name, [address] : [address]  The above potential benefits of such transfer, the potential risks associated with such transfer, and the probable risks of not being transferred have been explained to me, and I fully understand them  The doctor has explained that, in my case, the benefits of transfer outweigh the risks  I agree to be transferred  I authorize the performance of emergency medical procedures and treatments upon me in both transit and upon arrival at the receiving facility  Additionally, I authorize the release of any and all medical records to the receiving facility and request they be transported with me, if possible  I understand that the safest mode of transportation during a medical emergency is an ambulance and that the Hospital advocates the use of this mode of transport  Risks of traveling to the receiving facility by car, including absence of medical control, life sustaining equipment, such as oxygen, and medical personnel has been explained to me and I fully understand them      (TONY CORRECT BOX BELOW)  [  ]  I consent to the stated transfer and to be transported by ambulance/helicopter  [  ]  I consent to the stated transfer, but refuse transportation by ambulance and accept full responsibility for my transportation by car  I understand the risks of non-ambulance transfers and I exonerate the Hospital and its staff from any deterioration in my condition that results from this refusal     X___________________________________________    DATE  23  TIME________  Signature of patient or legally responsible individual signing on patient behalf           RELATIONSHIP TO PATIENT_________________________          Provider Certification    NAME José Bustamante                                         1960                              MRN [id number]    A medical screening exam was performed on the above named patient  Based on the examination:    Condition Necessitating Transfer The encounter diagnosis was Depression with suicidal ideation  Patient Condition: The patient has been stabilized such that within reasonable medical probability, no material deterioration of the patient condition or the condition of the unborn child(ambrocio) is likely to result from the transfer    Reason for Transfer: Level of Care needed not available at this facility    Transfer Requirements: [facility], [address] [place] [address]   · Space available and qualified personnel available for treatment as acknowledged by [place]  · Agreed to accept transfer and to provide appropriate medical treatment as acknowledged by       Dr Osito Denis  · Appropriate medical records of the examination and treatment of the patient are provided at the time of transfer   [address], [address] _______  · Transfer will be performed by qualified personnel from [facility]  and appropriate transfer equipment as required, including the use of necessary and appropriate life support measures      Provider Certification: I have examined the patient and explained the following risks and benefits of being transferred/refusing transfer to the patient/family:  General risk, such as traffic hazards, adverse weather conditions, rough terrain or turbulence, possible failure of equipment (including vehicle or aircraft), or consequences of actions of persons outside the control of the transport personnel      Based on these reasonable risks and benefits to the patient and/or the unborn child(ambrocio), and based upon the information available at the time of the patient’s examination, I certify that the medical benefits reasonably to be expected from the provision of appropriate medical treatments at another medical facility outweigh the increasing risks, if any, to the individual’s medical condition, and in the case of labor to the unborn child, from effecting the transfer      X____________________________________________ DATE 01/05/23        TIME_______      ORIGINAL - SEND TO MEDICAL RECORDS   COPY - SEND WITH PATIENT DURING TRANSFER

## 2023-01-05 NOTE — ED NOTES
Patient discharged to [facility] with transportation  Attempted to call report to facility x 3 with inability to transfer the call       Kasandra Jin, TI  01/05/23 1712

## 2023-01-05 NOTE — ED NOTES
Patient is accepted at [address] [place] [address]   Patient is accepted by Dr Monica Alejadnro per Ralph Au  [facility]  [facility] is scheduled for TBD  Patient may go to the floor at [facility]

## 2023-01-05 NOTE — ED NOTES
Call placed to the [facility], spoke with Leslie Hernandes states chart has been sent to psychiatry for review that she will call as soon as she has an update

## 2023-01-06 LAB
ATRIAL RATE: 105 BPM
P AXIS: 34 DEGREES
PR INTERVAL: 148 MS
QRS AXIS: -18 DEGREES
QRSD INTERVAL: 82 MS
QT INTERVAL: 340 MS
QTC INTERVAL: 449 MS
T WAVE AXIS: 41 DEGREES
VENTRICULAR RATE: 105 BPM

## 2023-02-06 ENCOUNTER — TELEPHONE (OUTPATIENT)
Dept: GASTROENTEROLOGY | Facility: CLINIC | Age: 63
End: 2023-02-06

## 2023-03-06 ENCOUNTER — TELEPHONE (OUTPATIENT)
Dept: GASTROENTEROLOGY | Facility: CLINIC | Age: 63
End: 2023-03-06

## 2023-03-06 NOTE — TELEPHONE ENCOUNTER
Procedure confirmed  Colonoscopy     Via: Spoke with patient  Instructions given: Mail     Prep Given: [facility]    Call the office if there are any questions

## 2023-03-13 ENCOUNTER — NURSE TRIAGE (OUTPATIENT)
Dept: OTHER | Facility: OTHER | Age: 63
End: 2023-03-13

## 2023-03-13 NOTE — TELEPHONE ENCOUNTER
Called patient  Questions already addressed 
Reason for Disposition  • Colonoscopy, questions about    Answer Assessment - Initial Assessment Questions  1  DATE/TIME: "When did you have your colonoscopy?"       3/17  2   MAIN CONCERN: "What is your main concern right now?" "What questions do you have?"     "I have the big jug with the liquid and I just need to know when he starts drinking "    [facility]  [facility] [place]    Protocols used: COLONOSCOPY SYMPTOMS AND QUESTIONS-ADULT-
Regarding: Colonoscopy Prep Questions  ----- Message from Tania Boyle sent at 3/13/2023 11:36 AM EDT -----  " My  is having a Colonoscopy on Friday and we have a few Questions regarding the bowel Prep "
Safety plan discussed with.../Provision of National Suicide Prevention Lifeline 8-717-037-TALK (9601)/Education provided regarding environmental safety / lethal means restriction

## 2023-03-17 ENCOUNTER — ANESTHESIA EVENT (OUTPATIENT)
Dept: GASTROENTEROLOGY | Facility: AMBULATORY SURGERY CENTER | Age: 63
End: 2023-03-17

## 2023-03-17 ENCOUNTER — HOSPITAL ENCOUNTER (OUTPATIENT)
Dept: GASTROENTEROLOGY | Facility: AMBULATORY SURGERY CENTER | Age: 63
Discharge: HOME | End: 2023-03-17

## 2023-03-17 ENCOUNTER — ANESTHESIA (OUTPATIENT)
Dept: GASTROENTEROLOGY | Facility: AMBULATORY SURGERY CENTER | Age: 63
End: 2023-03-17

## 2023-03-17 VITALS
BODY MASS INDEX: 30.07 KG/M2 | TEMPERATURE: 97.5 F | OXYGEN SATURATION: 95 % | HEART RATE: 89 BPM | RESPIRATION RATE: 20 BRPM | WEIGHT: 222 LBS | HEIGHT: 72 IN | SYSTOLIC BLOOD PRESSURE: 105 MMHG | DIASTOLIC BLOOD PRESSURE: 84 MMHG

## 2023-03-17 DIAGNOSIS — Z86.010 HISTORY OF COLON POLYPS: ICD-10-CM

## 2023-03-17 RX ORDER — PROPOFOL 10 MG/ML
INJECTION, EMULSION INTRAVENOUS AS NEEDED
Status: DISCONTINUED | OUTPATIENT
Start: 2023-03-17 | End: 2023-03-17

## 2023-03-17 RX ORDER — LIDOCAINE HYDROCHLORIDE 10 MG/ML
INJECTION, SOLUTION EPIDURAL; INFILTRATION; INTRACAUDAL; PERINEURAL AS NEEDED
Status: DISCONTINUED | OUTPATIENT
Start: 2023-03-17 | End: 2023-03-17

## 2023-03-17 RX ORDER — SODIUM CHLORIDE, SODIUM LACTATE, POTASSIUM CHLORIDE, CALCIUM CHLORIDE 600; 310; 30; 20 MG/100ML; MG/100ML; MG/100ML; MG/100ML
50 INJECTION, SOLUTION INTRAVENOUS CONTINUOUS
Status: DISCONTINUED | OUTPATIENT
Start: 2023-03-17 | End: 2023-03-21 | Stop reason: HOSPADM

## 2023-03-17 RX ADMIN — PROPOFOL 25 MG: 10 INJECTION, EMULSION INTRAVENOUS at 10:45

## 2023-03-17 RX ADMIN — PROPOFOL 25 MG: 10 INJECTION, EMULSION INTRAVENOUS at 10:56

## 2023-03-17 RX ADMIN — PROPOFOL 25 MG: 10 INJECTION, EMULSION INTRAVENOUS at 10:43

## 2023-03-17 RX ADMIN — PROPOFOL 30 MG: 10 INJECTION, EMULSION INTRAVENOUS at 10:36

## 2023-03-17 RX ADMIN — PROPOFOL 50 MG: 10 INJECTION, EMULSION INTRAVENOUS at 10:38

## 2023-03-17 RX ADMIN — SODIUM CHLORIDE, SODIUM LACTATE, POTASSIUM CHLORIDE, CALCIUM CHLORIDE 50 ML/HR: 600; 310; 30; 20 INJECTION, SOLUTION INTRAVENOUS at 10:21

## 2023-03-17 RX ADMIN — LIDOCAINE HYDROCHLORIDE 50 MG: 10 INJECTION, SOLUTION EPIDURAL; INFILTRATION; INTRACAUDAL; PERINEURAL at 10:34

## 2023-03-17 RX ADMIN — PROPOFOL 25 MG: 10 INJECTION, EMULSION INTRAVENOUS at 10:47

## 2023-03-17 RX ADMIN — PROPOFOL 70 MG: 10 INJECTION, EMULSION INTRAVENOUS at 10:35

## 2023-03-17 RX ADMIN — PROPOFOL 25 MG: 10 INJECTION, EMULSION INTRAVENOUS at 10:40

## 2023-03-17 RX ADMIN — PROPOFOL 25 MG: 10 INJECTION, EMULSION INTRAVENOUS at 10:52

## 2023-03-17 NOTE — ANESTHESIA PREPROCEDURE EVALUATION
Procedure:  COLONOSCOPY    Relevant Problems   CARDIO   (+) Coronary artery disease involving native coronary artery of native heart without angina pectoris   (+) Essential hypertension   (+) Mixed hyperlipidemia      MUSCULOSKELETAL   (+) Patellofemoral arthritis   (+) Pes anserinus bursitis of both knees   (+) Primary osteoarthritis of both knees      NEURO/PSYCH   (+) Depressive disorder   (+) History of colonic polyps      PULMONARY   (+) Obstructive sleep apnea syndrome      Other   (+) S/P PTCA (percutaneous transluminal coronary angioplasty)        Physical Exam    Airway    Mallampati score: II  TM Distance: >3 FB  Neck ROM: full     Dental   No notable dental hx     Cardiovascular      Pulmonary      Other Findings        Anesthesia Plan  ASA Score- 3     Anesthesia Type- IV sedation with anesthesia with ASA Monitors  Additional Monitors:   Airway Plan:           Plan Factors-Exercise tolerance (METS): >4 METS  Chart reviewed  EKG reviewed  Patient summary reviewed  Patient is not a current smoker  Induction- intravenous  Postoperative Plan-     Informed Consent- Anesthetic plan and risks discussed with patient  I personally reviewed this patient with the CRNA  Discussed and agreed on the Anesthesia Plan with the CRNA  Jazmin Madison

## 2023-03-17 NOTE — H&P
History and Physical - [facility]  Naveen Perea 57 y o  male MRN: [id number]    HPI: Naveen Perea is a 57y o  year old male who presents for colonoscopy for history of colon polyp  Last colonoscopy was about 6 years ago  REVIEW OF SYSTEMS: Per the HPI, and otherwise unremarkable      Historical Information   Past Medical History:   Diagnosis Date   • Hyperlipidemia    • Hypertension    • Myocardial infarction [facility])    • [facility]'s disease ([facility] )      Past Surgical History:   Procedure Laterality Date   • ANGIOPLASTY     • ANKLE FUSION     • CARDIAC SURGERY      stent   • CARPAL TUNNEL RELEASE Bilateral    • COLONOSCOPY     • KNEE ARTHROSCOPY Bilateral    • MENISCECTOMY Bilateral    • MN COLONOSCOPY FLX DX W/COLLJ SPEC WHEN PFRMD N/A 2019    Procedure: COLONOSCOPY with polypectomies;  Surgeon: Kostas Washington DO;  Location:  MAIN OR;  Service: Gastroenterology   • MN SURGICAL ARTHROSCOPY SHOULDER W/ROTATOR CUFF RPR Right 2020    Procedure: SHOULDER ARTHROSCOPIC ROTATOR CUFF REPAIR; SAD, BICEPS TENODESIS;  Surgeon: Harlene Kanner, MD;  Location: [facility]  MAIN OR;  Service: Orthopedics   • ROTATOR CUFF REPAIR Left    • TONSILLECTOMY       Social History   Social History     Substance and Sexual Activity   Alcohol Use Not Currently   • Alcohol/week: 1 0 standard drink   • Types: 1 Cans of beer per week    Comment: Socially     Social History     Substance and Sexual Activity   Drug Use Never     Social History     Tobacco Use   Smoking Status Former   • Types: Cigarettes   • Quit date: 2017   • Years since quittin 8   Smokeless Tobacco Never     Family History   Problem Relation Age of Onset   • Breast cancer Mother    • COPD Father    • Alcohol abuse Neg Hx    • Substance Abuse Neg Hx    • Mental illness Neg Hx        Meds/Allergies       Current Outpatient Medications:   •  aspirin 81 mg CHEW  •  atorvastatin (LIPITOR) 40 mg tablet  •  carbidopa-levodopa (SINEMET)  mg per tablet  •  traZODone (DESYREL) 150 mg tablet  •  Vitamin D, Cholecalciferol, 1000 units TABS  •  ARIPiprazole (ABILIFY) 2 mg tablet  •  citalopram (CeleXA) 40 mg tablet  •  doxycycline (ORACEA) 40 MG capsule  •  hydrOXYzine HCL (ATARAX) 25 mg tablet  •  lisinopril (ZESTRIL) 5 mg tablet  •  polyethylene glycol (GOLYTELY) 4000 mL solution  •  SOOLANTRA 1 % CREA    Current Facility-Administered Medications:   •  lactated ringers infusion, 50 mL/hr, Intravenous, Continuous, 50 mL/hr at 03/17/23 1021    No Known Allergies    Objective     /95   Pulse (!) 106   Temp 97 5 °F (36 4 °C) (Temporal)   Resp 20   Ht 6' (1 829 m)   Wt 101 kg (222 lb)   SpO2 95%   BMI 30 11 kg/m²     PHYSICAL EXAM    Gen: NAD AAOx3  Head: Normocephalic, Atraumatic  CV: S1S2 RRR no m/r/g  CHEST: Clear b/l no c/r/w  ABD: soft, +BS NT/ND  EXT: no edema    ASSESSMENT/PLAN:  This is a 57y o  year old male here for colonoscopy, and he is stable and optimized for his procedure

## 2023-03-17 NOTE — ANESTHESIA POSTPROCEDURE EVALUATION
Post-Op Assessment Note    CV Status:  Stable  Pain Score: 0    Pain management: adequate     Mental Status:  Sleepy   Hydration Status:  Euvolemic   PONV Controlled:  None   Airway Patency:  Patent      Post Op Vitals Reviewed: Yes      Staff: CRNA         No notable events documented      BP  120/81    Temp      Pulse 83   Resp  20    SpO2  100

## 2023-03-29 ENCOUNTER — OFFICE VISIT (OUTPATIENT)
Dept: VASCULAR SURGERY | Facility: CLINIC | Age: 63
End: 2023-03-29

## 2023-03-29 VITALS
WEIGHT: 254.4 LBS | DIASTOLIC BLOOD PRESSURE: 84 MMHG | OXYGEN SATURATION: 97 % | HEIGHT: 72 IN | HEART RATE: 101 BPM | SYSTOLIC BLOOD PRESSURE: 116 MMHG | BODY MASS INDEX: 34.46 KG/M2

## 2023-03-29 DIAGNOSIS — I72.4 POPLITEAL ARTERY ANEURYSM (HCC): Primary | ICD-10-CM

## 2023-03-29 DIAGNOSIS — I72.4 ANEURYSM OF ARTERY OF LOWER EXTREMITY (HCC): ICD-10-CM

## 2023-03-29 RX ORDER — SERTRALINE HYDROCHLORIDE 100 MG/1
100 TABLET, FILM COATED ORAL
COMMUNITY
Start: 2023-03-13

## 2023-03-29 RX ORDER — CHLORAL HYDRATE 500 MG
1000 CAPSULE ORAL DAILY
COMMUNITY

## 2023-03-29 RX ORDER — MULTIVITAMIN
1 CAPSULE ORAL DAILY
COMMUNITY

## 2023-03-29 NOTE — PROGRESS NOTES
Assessment/Plan:    Popliteal artery aneurysm [facility])  Patient is a pleasant 15-year-old gentleman who was undergoing work-up for bilateral knee osteoarthritis noting incidental finding of possible popliteal artery aneurysm  A formal outside facility duplex was obtained which suggested a 1 7 cm small saccular calcified aneurysm  He has palpable bilateral posterior tibial pulses  He does have what appears to be a right hallux subungual hematoma which he denies any trauma to  The remaining digits appear pink with excellent capillary refill no signs of distal atheroembolic events  Discussed the pathophysiology of peripheral artery aneurysm disease  We discussed the importance of ongoing surveillance as well as ruling out any infrarenal abdominal aortic aneurysm  Obtain a formal lower extremity arterial duplex as well as aortoiliac duplex with our vascular lab  Prefers I call him with results  Diagnoses and all orders for this visit:    Popliteal artery aneurysm ([address] )  -     VAS abdominal aorta/iliacs; complete study; Future  -     VAS lower limb arterial duplex, complete bilateral; Future    Aneurysm of artery of lower extremity (HCC)  -     Ambulatory Referral to Vascular Surgery    Other orders  -     sertraline (ZOLOFT) 100 mg tablet; 100 mg  -     Omega-3 Fatty Acids (fish oil) 1,000 mg; Take 1,000 mg by mouth daily  -     Multiple Vitamin (multivitamin) capsule; Take 1 capsule by mouth daily          Subjective:      Patient ID: German Diego is a 57 y o  male  Patient is new to our office  He was referred here by Dr Shania Yuen DO for a right popliteal artery aneurysm  Patient had X-rays done 3/8/2023 and a LEV was also done  Patient denies any pain  Patient is taking ASA 81 mg and Atorvastatin  He is a former smoker  [place] is a pleasant 15-year-old gentleman who is referred to the office for possible right popliteal artery aneurysm noted on outside facility duplex    He denies any claudication symptoms  He does have bilateral knee osteoarthritis  The following portions of the patient's history were reviewed and updated as appropriate: allergies, current medications, past family history, past medical history, past social history, past surgical history and problem list     Review of Systems      Objective:      /84 (BP Location: Left arm, Patient Position: Sitting, Cuff Size: Standard)   Pulse 101   Ht 6' (1 829 m)   Wt 115 kg (254 lb 6 4 oz)   SpO2 97%   BMI 34 50 kg/m²          Physical Exam  Constitutional:       General: He is not in acute distress  Appearance: He is well-developed  HENT:      Head: Normocephalic and atraumatic  Eyes:      General: No scleral icterus  Conjunctiva/sclera: Conjunctivae normal    Neck:      Trachea: No tracheal deviation  Cardiovascular:      Rate and Rhythm: Normal rate and regular rhythm  Pulses:           Posterior tibial pulses are 2+ on the right side and 2+ on the left side  Heart sounds: Normal heart sounds  Pulmonary:      Effort: Pulmonary effort is normal       Breath sounds: Normal breath sounds  Abdominal:      General: There is no distension  Palpations: Abdomen is soft  There is no mass (no appreciable aortic pulsation/aneurysm)  Tenderness: There is no abdominal tenderness  There is no guarding or rebound  Musculoskeletal:         General: Normal range of motion  Cervical back: Normal range of motion and neck supple  Skin:     General: Skin is warm and dry  Neurological:      Mental Status: He is alert and oriented to person, place, and time  Psychiatric:         Mood and Affect: Mood normal          Behavior: Behavior normal          Thought Content:  Thought content normal          Judgment: Judgment normal

## 2023-03-29 NOTE — ASSESSMENT & PLAN NOTE
Patient is a pleasant 26-year-old gentleman who was undergoing work-up for bilateral knee osteoarthritis noting incidental finding of possible popliteal artery aneurysm  A formal outside facility duplex was obtained which suggested a 1 7 cm small saccular calcified aneurysm  He has palpable bilateral posterior tibial pulses  He does have what appears to be a right hallux subungual hematoma which he denies any trauma to  The remaining digits appear pink with excellent capillary refill no signs of distal atheroembolic events  Discussed the pathophysiology of peripheral artery aneurysm disease  We discussed the importance of ongoing surveillance as well as ruling out any infrarenal abdominal aortic aneurysm  Obtain a formal lower extremity arterial duplex as well as aortoiliac duplex with our vascular lab  Prefers I call him with results

## 2023-04-27 ENCOUNTER — HOSPITAL ENCOUNTER (OUTPATIENT)
Dept: NON INVASIVE DIAGNOSTICS | Age: 63
Discharge: HOME | End: 2023-04-27

## 2023-04-27 DIAGNOSIS — I72.4 POPLITEAL ARTERY ANEURYSM (HCC): ICD-10-CM

## 2023-05-03 ENCOUNTER — HOSPITAL ENCOUNTER (OUTPATIENT)
Dept: NON INVASIVE DIAGNOSTICS | Age: 63
Discharge: HOME | End: 2023-05-03

## 2023-05-03 ENCOUNTER — TRANSCRIBE ORDERS (OUTPATIENT)
Dept: VASCULAR SURGERY | Facility: CLINIC | Age: 63
End: 2023-05-03

## 2023-05-03 DIAGNOSIS — I72.4 POPLITEAL ARTERY ANEURYSM (HCC): ICD-10-CM

## 2023-05-03 DIAGNOSIS — I72.4 POPLITEAL ARTERY ANEURYSM (HCC): Primary | ICD-10-CM

## 2023-05-05 ENCOUNTER — TELEPHONE (OUTPATIENT)
Dept: VASCULAR SURGERY | Facility: CLINIC | Age: 63
End: 2023-05-05

## 2023-05-05 NOTE — TELEPHONE ENCOUNTER
"Pt seen by Dr Hiral Cortez on 3/29 for popliteal artery aneurysm  He had AOIL done on 4/27 and JOSEPH on 5/3  Pt and wife called the office today to report that about 1 week ago he developed pain in the top of his R foot and seems to be gradually getting worse  He also reports swelling and erythema in the foot and it feels hot to touch  Currently, he rates the pain 5/10  It is worse w/ ambulation and at the end of the day  Pt reports numbness/tingling in the toes and that the feel \"stiff\"  Denies any symptoms in the rest of the leg  Informed pt and wife that I would send a message to our triage provider for recommendations and we will call him back    "

## 2023-05-05 NOTE — TELEPHONE ENCOUNTER
Reviewed AOIL and LEAD  AOIL showed mild plaque in the pelvis vessels, patient is already scheduled for repeat AOIL in 1 year  LEAD showed popliteal stenosis and R popliteal artery aneurysm 1 6cm (prior 1 7) and on the left measures 1 2cm  Distal perfusion is adequate bilaterally  At office visit with Dr Crockett Dose noted to have right big toe blood blister under the nail - is this still present or changed at all? Any new areas similar to  this develop in the right foot? Patient should be seen in the office to fully evaluate what is occurring in the right foot

## 2023-05-05 NOTE — TELEPHONE ENCOUNTER
Discussed w/ pt's wife- she states that the blood blister is still there and has not changed  Call was transferred to scheduling for OV

## 2023-05-12 ENCOUNTER — OFFICE VISIT (OUTPATIENT)
Dept: VASCULAR SURGERY | Facility: CLINIC | Age: 63
End: 2023-05-12

## 2023-05-12 VITALS
WEIGHT: 254 LBS | HEART RATE: 117 BPM | HEIGHT: 72 IN | BODY MASS INDEX: 34.4 KG/M2 | DIASTOLIC BLOOD PRESSURE: 70 MMHG | SYSTOLIC BLOOD PRESSURE: 136 MMHG

## 2023-05-12 DIAGNOSIS — I73.9 PERIPHERAL ARTERIAL DISEASE (HCC): ICD-10-CM

## 2023-05-12 DIAGNOSIS — I71.40 ABDOMINAL AORTIC ANEURYSM (AAA) WITHOUT RUPTURE, UNSPECIFIED PART (HCC): ICD-10-CM

## 2023-05-12 DIAGNOSIS — I72.4 POPLITEAL ARTERY ANEURYSM (HCC): Primary | ICD-10-CM

## 2023-05-12 NOTE — ASSESSMENT & PLAN NOTE
Reviewed AOIL from 4/27/23 which demonstrates distal Abdominal aorta measures 2 7cm  No previous studies for comparison      -Pt is asymptomatic at this time  Reviewed ultrasound study in detail with the pt  No vascular intervention planned at this time, continue to monitor with repeat ultrasound in 1 year and follow-up for review   -Continue to take ASA 81 mg daily   -Reviewed signs and symptoms of abdominal aortic rupture and indications to go to the ED/call 911

## 2023-05-12 NOTE — PATIENT INSTRUCTIONS
-Complete lower extremity ultrasound in 6 months and return to the office for review   -Complete abdominal ultrasound study in one year and return to the office for review   -Go to the ED/Call Signs and symptoms: If you may have any of the following:  Sudden pain in your abdomen, groin, back, legs, or buttocks  Nausea and vomiting  A lump or swelling in your abdomen  Stiff abdominal muscles  Numbness or tingling in your legs  Pale, sweaty, or clammy skin  Dizziness, fainting or loss of consciousness  - Call the office if you experience any changes to your legs or feet such as new pain, redness,swelling or a new wound/ open area  - Continue to take Aspirin and atorvastatin daily as ordered by your Family doctor

## 2023-05-12 NOTE — PROGRESS NOTES
Assessment/Plan:    Popliteal artery aneurysm (HCC)  Pt returns to the office for review of his LEAD on 5/3/23 for incidental finding of R popliteal aneurysm on his x-ray  LEAD demonstrates  RIGHT:  50-75% stenosis at prox popliteal artery and a <50% stenosis within the dSFA  The proximal popliteal artery  measuring 1 6cm   LETICIA: 1 32/ 153/ 85      LEFT:  diffuse disease throughout the femoral-popliteal with no areas of focal stenosis  The distal superficial femoral artery is ectatic 1 2cm   The proximal popliteal artery measures 1 0cm in diameter  LETICIA: 1 37/  175/ 98    -No previous studies for comparison   -Pt c/o of numbness and tingling in b/l feet that is constant  No claudication, R>L rest pain described as pins and needles, no wounds or tissue loss  Recommendations  Reviewed LEAD study with patient in detail which demonstrates bilateral legs are within healing range  Discussed continued monitoring of right popliteal aneurysm with ultrasound every 6 months  This is a new incidental finding for the patient we reviewed pathophysiology of peripheral artery disease and aneurysms and  indications for intervention  No vascular intervention at this time  - Discussed patient's bilateral foot numbness and tingling and recommending to follow-up with PCP for evaluation of hemoglobin A1c, his current symptoms do not appear to be from vascular etiology  - Continue with medical management of aspirin 81 mg daily  Abdominal aortic aneurysm (AAA) without rupture ([address] )  Reviewed AOIL from 4/27/23 which demonstrates distal Abdominal aorta measures 2 7cm  No previous studies for comparison      -Pt is asymptomatic at this time  Reviewed ultrasound study in detail with the pt    No vascular intervention planned at this time, continue to monitor with repeat ultrasound in 1 year and follow-up for review   -Continue to take ASA 81 mg daily   -Reviewed signs and symptoms of abdominal aortic rupture and indications to go to the ED/call 911  Peripheral arterial disease ([facility] )  LEAD from 5/3/23 demonstrates   RIGHT:  50-75% stenosis at prox popliteal artery and a <50% stenosis within the dSFA  LETICIA: 1 32/ 153/ 85      LEFT:  diffuse disease throughout the femoral-popliteal with no areas of focal stenosis  LETICIA: 1 37/  175/ 98    AOIL from 4/27/23 shows <50% stenosis in left distal common iliac artery      -Denies any true claudication, R>L mild numbness and tingling at night, no wounds or tissue loss  -Plan to continue yearly monitoring AOIL and LEAD or sooner if pt is experiencing increasing leg pain, swelling, discoloration or new wounds  Diagnoses and all orders for this visit:    Popliteal artery aneurysm (HCC)  -     VAS lower limb arterial duplex, complete bilateral; Future    Abdominal aortic aneurysm (AAA) without rupture, unspecified part ([facility] )    Peripheral arterial disease (HCC)          Subjective:      Patient ID: Fay William is a 70 y o  male  Patient present to review AOIL and JOSEPH done on 4/27/23 and 5/03/23  Patient c/o tiggiling on BLE Right worse than left  Patient is currently taking ASA and and Atorvastatin  75-year-old male with a past medical history of obstructive sleep apnea, hypertension, coronary artery disease, osteoarthritis bilateral knees, obesity with a BMI 34 45 kg/m, hyperlipidemia, peripheral artery disease, abdominal aortic aneurysm and popliteal aneurysm returns to the office for review of his LEAD and AOIL  Patient states he is having B/L feet have tingling sensation R>L that is constant  Denies claudication, States at night his L>R foot pain described as tingling pins and needles pain, no wounds or tissue loss  States that he has been having this issue for about 3-4 months  Former smoker, quit smoking 10 years ago, 1ppd  Denies family history of heart attack or stroke  Takes aspirin and atorvastatin daily  Denies any family history of aneurysm         The following portions of the patient's history were reviewed and updated as appropriate: allergies, current medications, past family history, past medical history, past social history, past surgical history and problem list     Review of Systems   Constitutional: Negative  HENT: Negative  Eyes: Negative  Respiratory: Negative  Negative for shortness of breath  Cardiovascular: Negative  Negative for chest pain and leg swelling  Gastrointestinal: Negative  Endocrine: Negative  Genitourinary: Negative  Musculoskeletal: Negative  Skin: Negative  Allergic/Immunologic: Negative  Neurological: Negative  Hematological: Negative  Psychiatric/Behavioral: Negative  Objective:      /70 (BP Location: Right arm, Patient Position: Sitting, Cuff Size: Adult)   Pulse (!) 117   Ht 6' (1 829 m)   Wt 115 kg (254 lb)   BMI 34 45 kg/m²          Physical Exam  Nursing note reviewed  Constitutional:       Appearance: Normal appearance  He is obese  HENT:      Head: Normocephalic and atraumatic  Neck:      Vascular: No carotid bruit  Cardiovascular:      Rate and Rhythm: Regular rhythm  Tachycardia present  Pulses:           Radial pulses are 2+ on the right side and 2+ on the left side  Femoral pulses are 1+ on the right side and 1+ on the left side  Popliteal pulses are 2+ on the right side and 2+ on the left side  Dorsalis pedis pulses are 2+ on the right side and 2+ on the left side  Posterior tibial pulses are 2+ on the right side and 2+ on the left side  Heart sounds: No murmur heard  Pulmonary:      Effort: Pulmonary effort is normal       Breath sounds: Normal breath sounds  Abdominal:      General: There is no distension  Palpations: Abdomen is soft  There is no mass  Tenderness: There is no abdominal tenderness  Musculoskeletal:         General: No swelling or tenderness  Skin:     General: Skin is warm and dry  Capillary Refill: Capillary refill takes less than 2 seconds  Neurological:      General: No focal deficit present  Mental Status: He is alert and oriented to person, place, and time  Psychiatric:         Mood and Affect: Mood normal          Behavior: Behavior normal            I have reviewed and made appropriate changes to the review of systems input by the medical assistant      Vitals:    05/12/23 0842   BP: 136/70   BP Location: Right arm   Patient Position: Sitting   Cuff Size: Adult   Pulse: (!) 117   Weight: 115 kg (254 lb)   Height: 6' (1 829 m)       Patient Active Problem List   Diagnosis   • Essential hypertension   • Morbid obesity ([facility] )   • S/P PTCA (percutaneous transluminal coronary angioplasty)   • Coronary artery disease involving native coronary artery of native heart without angina pectoris   • Primary osteoarthritis of both knees   • Mixed hyperlipidemia   • History of colonic polyps   • Patellofemoral arthritis   • Obstructive sleep apnea syndrome   • Chronic pain of both knees   • Pes anserinus bursitis of both knees   • Facial basal cell cancer   • Depressive disorder   • Polyp of colon   • Rosacea   • Popliteal artery aneurysm (HCC)   • Abdominal aortic aneurysm (AAA) without rupture (HCC)   • Peripheral arterial disease ([facility] )       Past Surgical History:   Procedure Laterality Date   • ANGIOPLASTY     • ANKLE FUSION     • CARDIAC SURGERY  2015    stent   • CARPAL TUNNEL RELEASE Bilateral    • COLONOSCOPY     • KNEE ARTHROSCOPY Bilateral    • MENISCECTOMY Bilateral    • NV COLONOSCOPY FLX DX W/COLLJ SPEC WHEN PFRMD N/A 4/24/2019    Procedure: COLONOSCOPY with polypectomies;  Surgeon: Dasia Kim DO;  Location:  MAIN OR;  Service: Gastroenterology   • NV SURGICAL ARTHROSCOPY SHOULDER W/ROTATOR CUFF RPR Right 2/7/2020    Procedure: SHOULDER ARTHROSCOPIC ROTATOR CUFF REPAIR; SAD, BICEPS TENODESIS;  Surgeon: Marsha Luz MD;  Location: [facility]  MAIN OR;  Service: Orthopedics   • ROTATOR CUFF REPAIR Left    • TONSILLECTOMY         Family History   Problem Relation Age of Onset   • Breast cancer Mother    • COPD Father    • Alcohol abuse Neg Hx    • Substance Abuse Neg Hx    • Mental illness Neg Hx        Social History     Socioeconomic History   • Marital status: /Civil Union     Spouse name: Not on file   • Number of children: Not on file   • Years of education: Not on file   • Highest education level: Not on file   Occupational History   • Not on file   Tobacco Use   • Smoking status: Former     Types: Cigarettes     Quit date: 2017     Years since quittin 9   • Smokeless tobacco: Never   Vaping Use   • Vaping Use: Never used   Substance and Sexual Activity   • Alcohol use: Not Currently     Alcohol/week: 1 0 standard drink     Types: 1 Cans of beer per week     Comment: Socially   • Drug use: Never   • Sexual activity: Yes     Partners: Female   Other Topics Concern   • Not on file   Social History Narrative   • Not on file     Social Determinants of Health     Financial Resource Strain: Not on file   Food Insecurity: Not on file   Transportation Needs: Not on file   Physical Activity: Not on file   Stress: Not on file   Social Connections: Not on file   Intimate Partner Violence: Not on file   Housing Stability: Not on file       No Known Allergies      Current Outpatient Medications:   •  ARIPiprazole (ABILIFY) 2 mg tablet, TAKE 1 TABLET BY MOUTH EVERY DAY, Disp: 90 tablet, Rfl: 1  •  aspirin 81 mg CHEW, Chew 81 mg, Disp: , Rfl:   •  atorvastatin (LIPITOR) 40 mg tablet, TAKE 1 TABLET BY MOUTH EVERYDAY AT BEDTIME, Disp: 90 tablet, Rfl: 3  •  carbidopa-levodopa (SINEMET)  mg per tablet, Take 1 tablet by mouth 3 (three) times a day, Disp: , Rfl:   •  citalopram (CeleXA) 40 mg tablet, TAKE 1 TABLET BY MOUTH  DAILY, Disp: 90 tablet, Rfl: 0  •  doxycycline (ORACEA) 40 MG capsule, Take 1 capsule (40 mg total) by mouth every morning, Disp: 90 capsule, Rfl: 1  • hydrOXYzine HCL (ATARAX) 25 mg tablet, Take 1 tablet (25 mg total) by mouth every 6 (six) hours, Disp: 12 tablet, Rfl: 0  •  lisinopril (ZESTRIL) 5 mg tablet, TAKE 1 TABLET BY MOUTH  DAILY, Disp: 30 tablet, Rfl: 0  •  Multiple Vitamin (multivitamin) capsule, Take 1 capsule by mouth daily, Disp: , Rfl:   •  Omega-3 Fatty Acids (fish oil) 1,000 mg, Take 1,000 mg by mouth daily, Disp: , Rfl:   •  sertraline (ZOLOFT) 100 mg tablet, 100 mg, Disp: , Rfl:   •  SOOLANTRA 1 % CREA, , Disp: , Rfl:   •  traZODone (DESYREL) 150 mg tablet, Take 1 tablet (150 mg total) by mouth daily at bedtime, Disp: 90 tablet, Rfl: 1  •  Vitamin D, Cholecalciferol, 1000 units TABS, Take 1 tablet (1,000 Units total) by mouth daily, Disp: 90 tablet, Rfl: 1  I have spent a total time of 45 minutes on 05/12/23 in caring for this patient including Diagnostic results, Instructions for management, Patient and family education, Importance of tx compliance, Risk factor reductions, Documenting in the medical record, Reviewing / ordering tests, medicine, procedures   and Obtaining or reviewing history

## 2023-05-12 NOTE — ASSESSMENT & PLAN NOTE
Pt returns to the office for review of his LEAD on 5/3/23 for incidental finding of R popliteal aneurysm on his x-ray  LEAD demonstrates  RIGHT:  50-75% stenosis at prox popliteal artery and a <50% stenosis within the dSFA  The proximal popliteal artery  measuring 1 6cm   LETICIA: 1 32/ 153/ 85      LEFT:  diffuse disease throughout the femoral-popliteal with no areas of focal stenosis  The distal superficial femoral artery is ectatic 1 2cm   The proximal popliteal artery measures 1 0cm in diameter  LETICIA: 1 37/  175/ 98    -No previous studies for comparison   -Pt c/o of numbness and tingling in b/l feet that is constant  No claudication, R>L rest pain described as pins and needles, no wounds or tissue loss  Recommendations  Reviewed LEAD study with patient in detail which demonstrates bilateral legs are within healing range  Discussed continued monitoring of right popliteal aneurysm with ultrasound every 6 months  This is a new incidental finding for the patient we reviewed pathophysiology of peripheral artery disease and aneurysms and  indications for intervention  No vascular intervention at this time  - Discussed patient's bilateral foot numbness and tingling and recommending to follow-up with PCP for evaluation of hemoglobin A1c, his current symptoms do not appear to be from vascular etiology  - Continue with medical management of aspirin 81 mg daily

## 2023-05-12 NOTE — ASSESSMENT & PLAN NOTE
LEAD from 5/3/23 demonstrates   RIGHT:  50-75% stenosis at prox popliteal artery and a <50% stenosis within the dSFA  LETICIA: 1 32/ 153/ 85      LEFT:  diffuse disease throughout the femoral-popliteal with no areas of focal stenosis  LETICIA: 1 37/  175/ 98    AOIL from 4/27/23 shows <50% stenosis in left distal common iliac artery      -Denies any true claudication, R>L mild numbness and tingling at night, no wounds or tissue loss  -Plan to continue yearly monitoring AOIL and LEAD or sooner if pt is experiencing increasing leg pain, swelling, discoloration or new wounds

## 2023-11-07 ENCOUNTER — HOSPITAL ENCOUNTER (OUTPATIENT)
Dept: NON INVASIVE DIAGNOSTICS | Age: 63
Discharge: HOME | End: 2023-11-07
Payer: COMMERCIAL

## 2023-11-07 DIAGNOSIS — I72.4 POPLITEAL ARTERY ANEURYSM (HCC): ICD-10-CM

## 2023-11-07 PROCEDURE — 93922 UPR/L XTREMITY ART 2 LEVELS: CPT | Performed by: SURGERY

## 2023-11-07 PROCEDURE — 93925 LOWER EXTREMITY STUDY: CPT | Performed by: SURGERY

## 2023-11-07 PROCEDURE — 93923 UPR/LXTR ART STDY 3+ LVLS: CPT

## 2023-11-07 PROCEDURE — 93925 LOWER EXTREMITY STUDY: CPT

## 2023-11-09 ENCOUNTER — TRANSCRIBE ORDERS (OUTPATIENT)
Dept: VASCULAR SURGERY | Facility: CLINIC | Age: 63
End: 2023-11-09

## 2023-11-09 DIAGNOSIS — I73.9 PERIPHERAL ARTERIAL DISEASE (HCC): Primary | ICD-10-CM

## 2024-02-25 ENCOUNTER — HOSPITAL ENCOUNTER (OUTPATIENT)
Dept: MRI IMAGING | Facility: HOSPITAL | Age: 64
Discharge: HOME | End: 2024-02-25
Payer: COMMERCIAL

## 2024-02-25 DIAGNOSIS — M54.50 LOW BACK PAIN RADIATING TO LOWER EXTREMITY: ICD-10-CM

## 2024-02-25 DIAGNOSIS — M79.606 LOW BACK PAIN RADIATING TO LOWER EXTREMITY: ICD-10-CM

## 2024-02-25 PROCEDURE — 72148 MRI LUMBAR SPINE W/O DYE: CPT

## 2024-02-25 PROCEDURE — G1004 CDSM NDSC: HCPCS

## 2024-03-15 ENCOUNTER — HOSPITAL ENCOUNTER (OUTPATIENT)
Dept: CT IMAGING | Facility: HOSPITAL | Age: 64
Discharge: HOME | End: 2024-03-15
Payer: COMMERCIAL

## 2024-03-15 DIAGNOSIS — Z72.0 TOBACCO USE: ICD-10-CM

## 2024-03-15 PROCEDURE — 71271 CT THORAX LUNG CANCER SCR C-: CPT

## 2024-05-17 ENCOUNTER — HOSPITAL ENCOUNTER (OUTPATIENT)
Dept: NON INVASIVE DIAGNOSTICS | Age: 64
Discharge: HOME | End: 2024-05-17
Payer: COMMERCIAL

## 2024-05-17 DIAGNOSIS — I72.4 POPLITEAL ARTERY ANEURYSM (HCC): ICD-10-CM

## 2024-05-17 PROCEDURE — 93978 VASCULAR STUDY: CPT | Performed by: SURGERY

## 2024-05-17 PROCEDURE — 93978 VASCULAR STUDY: CPT

## 2024-08-05 ENCOUNTER — HOSPITAL ENCOUNTER (OUTPATIENT)
Dept: NON INVASIVE DIAGNOSTICS | Age: 64
Discharge: HOME | End: 2024-08-05
Payer: COMMERCIAL

## 2024-08-05 DIAGNOSIS — I73.9 PERIPHERAL ARTERIAL DISEASE (HCC): ICD-10-CM

## 2024-08-05 PROCEDURE — 93922 UPR/L XTREMITY ART 2 LEVELS: CPT | Performed by: SURGERY

## 2024-08-05 PROCEDURE — 93923 UPR/LXTR ART STDY 3+ LVLS: CPT

## 2024-08-05 PROCEDURE — 93925 LOWER EXTREMITY STUDY: CPT

## 2024-08-05 PROCEDURE — 93925 LOWER EXTREMITY STUDY: CPT | Performed by: SURGERY

## 2024-11-04 ENCOUNTER — TELEPHONE (OUTPATIENT)
Dept: VASCULAR SURGERY | Facility: CLINIC | Age: 64
End: 2024-11-04

## 2024-11-04 ENCOUNTER — TELEPHONE (OUTPATIENT)
Age: 64
End: 2024-11-04

## 2024-11-04 NOTE — TELEPHONE ENCOUNTER
Appt to be rescheduled for 11/4 with  [facility] in [place], will need [facility] before [facility]t can be made, referral is still pending

## 2024-11-04 NOTE — TELEPHONE ENCOUNTER
Called and left msg to make sure pt calls and r/s appointment for today/ Provider will not be in the office due to an emergency .

## 2024-11-04 NOTE — TELEPHONE ENCOUNTER
Caller:  Raul Smith     Doctor:  Dr. Lopez Hercules     Reason for call:  Patient calling to reschedule appt per notes in the referral we need a new approval to make a new appt.     Call back#:  [phone]

## 2025-01-10 ENCOUNTER — EVALUATION (OUTPATIENT)
Dept: PHYSICAL THERAPY | Facility: CLINIC | Age: 65
End: 2025-01-10
Payer: COMMERCIAL

## 2025-01-10 DIAGNOSIS — Z96.652 S/P TOTAL KNEE REPLACEMENT, LEFT: Primary | ICD-10-CM

## 2025-01-10 PROCEDURE — 97110 THERAPEUTIC EXERCISES: CPT | Performed by: PHYSICAL THERAPIST

## 2025-01-10 PROCEDURE — 97161 PT EVAL LOW COMPLEX 20 MIN: CPT | Performed by: PHYSICAL THERAPIST

## 2025-01-10 NOTE — LETTER
January 10, 2025    Bakari Larry DO  [address] [facility] [address]    Patient: Raul Smith   YOB: 1960   Date of Visit: 1/10/2025     Encounter Diagnosis     ICD-10-CM    1. S/P total knee replacement, left  Z96.652           Dear Dr. Larry:    Thank you for your recent referral of Raul Smith. Please review the attached evaluation summary from Raul's recent visit.     Please verify that you agree with the plan of care by signing the attached order.     If you have any questions or concerns, please do not hesitate to call.     I sincerely appreciate the opportunity to share in the care of one of your patients and hope to have another opportunity to work with you in the near future.       Sincerely,    Jacqueline Ling, PT      Referring Provider:      I certify that I have read the below Plan of Care and certify the need for these services furnished under this plan of treatment while under my care.                    Bakari Larry DO  [address] [facility] [address]  Via Fax: [phone]          PT Evaluation     Today's date: 1/10/2025  Patient name: Raul Smith  : 1960  MRN: [id number]  Referring provider: Bakari Larry DO  Dx:   Encounter Diagnosis     ICD-10-CM    1. S/P total knee replacement, left  Z96.652                      Assessment  Impairments: abnormal muscle firing, abnormal or restricted ROM, activity intolerance, impaired balance, impaired physical strength, lacks appropriate home exercise program, pain with function and poor body mechanics    Assessment details: Raul Smith is a pleasant 64 y.o. male who presents with L knee pain 4 wks s[facility]  he has L knee swelling, ROM restriction, weakness and balance difficulties resulting in gait dysfunction and difficulty performing household tasks, ADLs and caring for his elderly father..  No further referral appears necessary at this time based upon  examination results.  I expect he will improve in 10-12 wks with physical therapy and home program.      Barriers to therapy: n/a  Understanding of Dx/Px/POC: good     Prognosis: good    Goals  ST.  Independent with HEP in 2 weeks  2. Decrease pain by 50% in 3 wks  3.  Increase L knee ext AROM to 3 degrees in 3 weeks   4. Increase L knee flex AROM to 105 deg in 3 wks.    LT. Achieve FOTO score expected in 6 weeks   2.  Able to ambulate without a SPC in 6 weeks  3.  Strength = 5/5 L knee ext in 6 weeks      Plan  Patient would benefit from: skilled physical therapy  Planned modality interventions: cryotherapy, electrical stimulation/Russian stimulation and thermotherapy: hydrocollator packs    Planned therapy interventions: abdominal trunk stabilization, manual therapy, neuromuscular re-education, therapeutic activities, therapeutic exercise, body mechanics training and home exercise program    Frequency: 2x week  Duration in weeks: 6  Plan of Care beginning date: 1/10/2025  Plan of Care expiration date: 2025  Treatment plan discussed with: patient  Plan details: RE in 6 weeks        Subjective Evaluation    History of Present Illness  Mechanism of injury: Pt is 4 weeks s/p L TKA done on 24 at the [facility]. He had about 2 weeks of home therapy, working on flexibility, stairs and light strengthening. He is ambulating with a SPC and he has returned to driving.    L knee pain is 1-10/10. Worse at the end of the day. Agg with squatting (STS). Eased with tramadol, ice. He does have numbness to the surface of the knee. He has had near-instances of the knee giving out, but he has caught himself. He does not have a history of falls in the past year, but does feel unsteady standing and walking. Denies calf pain, redness, swelling.     He lives with his wife in a [place]style home and stairs to enter. He manages this one step at a time with railing and cane support. He is independent with ADLs, but  has difficulty with getting in/out of the shower and putting on socks and shoes. In his free time he enjoys doing wood-working, working on vehicles, caring for his dad. He does have adequate upper body strength, but he has concern for his steadiness on his feet, especially when helping his dad with tasks.  Patient Goals  Patient goals for therapy: decreased edema, decreased pain, improved balance, increased motion, return to sport/leisure activities, independence with ADLs/IADLs and increased strength          Objective     Observations     Additional Observation Details  Incision closed with appropriate healing, moderate dry flaking skin about the knee; moderate edema of the knee    Palpation   Left   Tenderness of the lateral gastrocnemius and medial gastrocnemius.     Active Range of Motion   Left Knee   Flexion: 95 degrees   Extension: 8 degrees     Right Knee   Flexion: 120 degrees   Extension: 3 degrees     Mobility   Patellar Mobility:   Left Knee   Hypomobile: left medial, left lateral, left superior and left inferior    Strength/Myotome Testing     Left Hip   Planes of Motion   Flexion: 4+    Right Hip   Planes of Motion   Flexion: 4+    Left Knee   Flexion: 4+  Extension: 4  Quadriceps contraction: fair    Right Knee   Flexion: 4+  Extension: 4+  Quadriceps contraction: fair    Additional Strength Details  SLR with difficulty, achieved approx 30 deg unassisted    Functional Assessment        Comments  Gait: antalgic with SPC             Prec[facility]: [facility]  Comorbidities: HTN, MI, Parkinson's       HEP:  Access Code: [email or web address]  URL: [email or web address]  Date: 01/10/2025  Prepared by: Jacqueline Ling    Exercises  - Seated Hamstring Stretch  - 3 x daily - 10 reps - 5 sec hold  - Supine Heel Slide with Strap  - 3 x daily - 10 reps - 5 sec hold  - Straight Leg Raise  - 3 x daily - 5 reps  - Seated Long Arc Quad  - 3 x daily - 10 reps        POC Expires Reeval for Medicare to be  "completed Unit LImit Auth Expiration Date PT/OT/STVisit Limit   2/21/25 By visit n/a N/a 5/7/25 15    Completed on visit                  TREATMENT DIARY    Auth status 1/10            approved               Visit # 1 2 3 4 5 6 7 8 9 10   Visits Remaining 14 13 12 11 10                     Manuals             L knee PROM             L patella PROM             LLE STM                          Neuro Re-Ed             Tandem walk             Side step             Tandem balance             SLS             RB taps             Charles step over                          Ther Ex             Bike - cardio             Knee flex ROM Heel slide x10            Knee ext ROM HS stretch +OP 5\"x10             LAQ x20            SLR x5            Leg press             Knee ext machine             HSC machine             Step up fwd             STS                                                                 Ther Activity                                       Gait Training                                       Modalities                                                          "

## 2025-01-10 NOTE — PROGRESS NOTES
PT Evaluation     Today's date: 1/10/2025  Patient name: Raul Smith  : 1960  MRN: [id number]  Referring provider: Bakari aLrry DO  Dx:   Encounter Diagnosis     ICD-10-CM    1. S/P total knee replacement, left  Z96.652                      Assessment  Impairments: abnormal muscle firing, abnormal or restricted ROM, activity intolerance, impaired balance, impaired physical strength, lacks appropriate home exercise program, pain with function and poor body mechanics    Assessment details: Raul Smith is a pleasant 64 y.o. male who presents with L knee pain 4 wks s[facility]  he has L knee swelling, ROM restriction, weakness and balance difficulties resulting in gait dysfunction and difficulty performing household tasks, ADLs and caring for his elderly father..  No further referral appears necessary at this time based upon examination results.  I expect he will improve in 10-12 wks with physical therapy and home program.      Barriers to therapy: n/a  Understanding of Dx/Px/POC: good     Prognosis: good    Goals  ST.  Independent with HEP in 2 weeks  2. Decrease pain by 50% in 3 wks  3.  Increase L knee ext AROM to 3 degrees in 3 weeks   4. Increase L knee flex AROM to 105 deg in 3 wks.    LT. Achieve FOTO score expected in 6 weeks   2.  Able to ambulate without a SPC in 6 weeks  3.  Strength = 5/5 L knee ext in 6 weeks      Plan  Patient would benefit from: skilled physical therapy  Planned modality interventions: cryotherapy, electrical stimulation/Russian stimulation and thermotherapy: hydrocollator packs    Planned therapy interventions: abdominal trunk stabilization, manual therapy, neuromuscular re-education, therapeutic activities, therapeutic exercise, body mechanics training and home exercise program    Frequency: 2x week  Duration in weeks: 6  Plan of Care beginning date: 1/10/2025  Plan of Care expiration date: 2025  Treatment plan discussed with:  patient  Plan details: RE in 6 weeks        Subjective Evaluation    History of Present Illness  Mechanism of injury: Pt is 4 weeks s/p L TKA done on 12/19/24 at the [facility]. He had about 2 weeks of home therapy, working on flexibility, stairs and light strengthening. He is ambulating with a SPC and he has returned to driving.    L knee pain is 1-10/10. Worse at the end of the day. Agg with squatting (STS). Eased with tramadol, ice. He does have numbness to the surface of the knee. He has had near-instances of the knee giving out, but he has caught himself. He does not have a history of falls in the past year, but does feel unsteady standing and walking. Denies calf pain, redness, swelling.     He lives with his wife in a [place]style home and stairs to enter. He manages this one step at a time with railing and cane support. He is independent with ADLs, but has difficulty with getting in/out of the shower and putting on socks and shoes. In his free time he enjoys doing wood-working, working on vehicles, caring for his dad. He does have adequate upper body strength, but he has concern for his steadiness on his feet, especially when helping his dad with tasks.  Patient Goals  Patient goals for therapy: decreased edema, decreased pain, improved balance, increased motion, return to sport/leisure activities, independence with ADLs/IADLs and increased strength          Objective     Observations     Additional Observation Details  Incision closed with appropriate healing, moderate dry flaking skin about the knee; moderate edema of the knee    Palpation   Left   Tenderness of the lateral gastrocnemius and medial gastrocnemius.     Active Range of Motion   Left Knee   Flexion: 95 degrees   Extension: 8 degrees     Right Knee   Flexion: 120 degrees   Extension: 3 degrees     Mobility   Patellar Mobility:   Left Knee   Hypomobile: left medial, left lateral, left superior and left inferior    Strength/Myotome Testing  "    Left Hip   Planes of Motion   Flexion: 4+    Right Hip   Planes of Motion   Flexion: 4+    Left Knee   Flexion: 4+  Extension: 4  Quadriceps contraction: fair    Right Knee   Flexion: 4+  Extension: 4+  Quadriceps contraction: fair    Additional Strength Details  SLR with difficulty, achieved approx 30 deg unassisted    Functional Assessment        Comments  Gait: antalgic with SPC             Prec[facility]: [facility]  Comorbidities: HTN, MI, Parkinson's       HEP:  Access Code: [email or web address]  URL: [email or web address]  Date: 01/10/2025  Prepared by: Jacqueline Ling    Exercises  - Seated Hamstring Stretch  - 3 x daily - 10 reps - 5 sec hold  - Supine Heel Slide with Strap  - 3 x daily - 10 reps - 5 sec hold  - Straight Leg Raise  - 3 x daily - 5 reps  - Seated Long Arc Quad  - 3 x daily - 10 reps        POC Expires Reeval for Medicare to be completed Unit LImit Auth Expiration Date PT/OT/STVisit Limit   2/21/25 By visit n/a N/a 5/7/25 15    Completed on visit                  TREATMENT DIARY    Auth status 1/10            approved               Visit # 1 2 3 4 5 6 7 8 9 10   Visits Remaining 14 13 12 11 10                     Manuals             L knee PROM             L patella PROM             LLE STM                          Neuro Re-Ed             Tandem walk             Side step             Tandem balance             SLS             RB taps             Charles step over                          Ther Ex             Bike - cardio             Knee flex ROM Heel slide x10            Knee ext ROM HS stretch +OP 5\"x10             LAQ x20            SLR x5            Leg press             Knee ext machine             HSC machine             Step up fwd             STS                                                                 Ther Activity                                       Gait Training                                       Modalities                                            "

## 2025-01-14 ENCOUNTER — OFFICE VISIT (OUTPATIENT)
Dept: PHYSICAL THERAPY | Facility: CLINIC | Age: 65
End: 2025-01-14
Payer: COMMERCIAL

## 2025-01-14 DIAGNOSIS — Z96.652 S/P TOTAL KNEE REPLACEMENT, LEFT: Primary | ICD-10-CM

## 2025-01-14 PROCEDURE — 97140 MANUAL THERAPY 1/> REGIONS: CPT | Performed by: PHYSICAL THERAPIST

## 2025-01-14 PROCEDURE — 97110 THERAPEUTIC EXERCISES: CPT | Performed by: PHYSICAL THERAPIST

## 2025-01-14 NOTE — PROGRESS NOTES
Daily Note     Today's date: 2025  Patient name: Raul Smith  : 1960  MRN: [id number]  Referring provider: Bakari Larry DO  Dx:   Encounter Diagnosis     ICD-10-CM    1. S/P total knee replacement, left  Z96.652           Start Time: 1045          Subjective: Has been consistent with the stretches. Feeling stiff today.      Objective: See treatment diary below      Assessment: Pt continues with joint hypomobility, quad tightness and swelling as the primary restraints to full motion. He shows visible improvement by end of session in his ROM, with measurement of 5-98 deg. Unable to make full fwd revolutions on the bike today, anticipate he will by next session. Plan to spend more time strengthening nv. He will benefit from continued therapy to maximize function.    Plan: add quad and glute strengthening as appropriate nv     Precautions: [facility], [facility]  Comorbidities: HTN, MI, Parkinson's       HEP:  Access Code: [email or web address]  URL: [email or web address]  Date: 01/10/2025  Prepared by: Jacqueline Ling    Exercises  - Seated Hamstring Stretch  - 3 x daily - 10 reps - 5 sec hold  - Supine Heel Slide with Strap  - 3 x daily - 10 reps - 5 sec hold  - Straight Leg Raise  - 3 x daily - 5 reps  - Seated Long Arc Quad  - 3 x daily - 10 reps        POC Expires Reeval for Medicare to be completed Unit LImit Auth Expiration Date PT/OT/STVisit Limit   25 By visit n/a N/a 25 15    Completed on visit                  TREATMENT DIARY    Auth status 1/10 1/14           approved               Visit # 1 2 3 4 5 6 7 8 9 10   Visits Remaining 14 13 12 11 10                     Manuals             L knee PROM  ALINE           LLE stretch  Prone quad           L patella PROM  ALINE           LLE STM                          Neuro Re-Ed             Tandem walk             Side step             Tandem balance             SLS             RB taps             Charles step over                          Ther  "Ex             Bike - cardio  ROM 5'           Knee flex ROM Heel slide x10 Pre TB HSC 3'           Knee ext ROM HS stretch +OP 5\"x10             Calf stretch  Slant 10\"x10           LAQ x20 5# x30           SLR x5            Leg press             Knee ext machine             HSC machine             Step up fwd             STS                                                                 Ther Activity                                       Gait Training                                       Modalities                                                 "

## 2025-01-17 ENCOUNTER — OFFICE VISIT (OUTPATIENT)
Dept: PHYSICAL THERAPY | Facility: CLINIC | Age: 65
End: 2025-01-17
Payer: COMMERCIAL

## 2025-01-17 DIAGNOSIS — Z96.652 S/P TOTAL KNEE REPLACEMENT, LEFT: Primary | ICD-10-CM

## 2025-01-17 PROCEDURE — 97140 MANUAL THERAPY 1/> REGIONS: CPT | Performed by: PHYSICAL THERAPIST

## 2025-01-17 PROCEDURE — 97110 THERAPEUTIC EXERCISES: CPT | Performed by: PHYSICAL THERAPIST

## 2025-01-17 NOTE — PROGRESS NOTES
Daily Note     Today's date: 2025  Patient name: Raul Smith  : 1960  MRN: [id number]  Referring provider: Bakari Larry DO  Dx:   Encounter Diagnosis     ICD-10-CM    1. S/P total knee replacement, left  Z96.652           Start Time: 1430          Subjective: Good days and bad. The day after therapy, the knee swelled up.      Objective: See treatment diary below      Assessment: Pt continues with joint hypomobility, quad tightness and swelling as the primary restraints to full motion. He shows visible improvement by end of session in his ROM. Able to make full revolutions on the bike today. Added marching and low step ups for intro to strengthening without exacerbating his knee too much that it would cause excessive swelling. He will benefit from continued therapy to maximize function.    Plan: add quad and glute strengthening as appropriate nv     Precautions: s/p L [facility], [facility]  Comorbidities: HTN, MI, Parkinson's       HEP:  Access Code: [email or web address]  URL: [email or web address]  Date: 01/10/2025  Prepared by: Jacqueline Ling    Exercises  - Seated Hamstring Stretch  - 3 x daily - 10 reps - 5 sec hold  - Supine Heel Slide with Strap  - 3 x daily - 10 reps - 5 sec hold  - Straight Leg Raise  - 3 x daily - 5 reps  - Seated Long Arc Quad  - 3 x daily - 10 reps        POC Expires Reeval for Medicare to be completed Unit LImit Auth Expiration Date PT/OT/STVisit Limit   25 By visit n/a N/a 25 15    Completed on visit                  TREATMENT DIARY    Auth status 1/10 1/14 1/17          approved               Visit # 1 2 3 4 5 6 7 8 9 10   Visits Remaining 14 13 12 11 10                     Manuals             L knee PROM  ALINE ALINE prone and seated          LLE stretch  Prone quad Prone quad          L patella PROM  ALINE           LLE STM                          Neuro Re-Ed             Tandem walk             Side step             Tandem balance             SLS             RB taps    "          Charles step over                          Ther Ex             Bike - cardio  ROM 5' ROM 6'          Knee flex ROM Heel slide x10 Pre TB HSC 3'           Knee ext ROM HS stretch +OP 5\"x10             Calf stretch  Slant 10\"x10 Slant 10\"x10          LAQ x20 5# x30 5# x30          SLR x5            Leg press             Knee ext machine             HSC machine             march   X10 ea          Step up fwd   4in x10 ea          STS                                                                 Ther Activity                                       Gait Training                                       Modalities                                                 "

## 2025-01-21 ENCOUNTER — OFFICE VISIT (OUTPATIENT)
Dept: PHYSICAL THERAPY | Facility: CLINIC | Age: 65
End: 2025-01-21
Payer: COMMERCIAL

## 2025-01-21 DIAGNOSIS — Z96.652 S/P TOTAL KNEE REPLACEMENT, LEFT: Primary | ICD-10-CM

## 2025-01-21 PROCEDURE — 97110 THERAPEUTIC EXERCISES: CPT | Performed by: PHYSICAL THERAPIST

## 2025-01-21 PROCEDURE — 97140 MANUAL THERAPY 1/> REGIONS: CPT | Performed by: PHYSICAL THERAPIST

## 2025-01-21 NOTE — PROGRESS NOTES
Daily Note     Today's date: 2025  Patient name: Raul Smith  : 1960  MRN: [id number]  Referring provider: Bakari Larry DO  Dx:   Encounter Diagnosis     ICD-10-CM    1. S/P total knee replacement, left  Z96.652           Start Time: 915          Subjective: Good days and bad. The day after therapy, the knee swelled up.      Objective: See treatment diary below      Assessment: Pt continues with joint hypomobility, quad tightness and swelling as the primary restraints to full motion. He shows visible improvement by end of session in his ROM. Added leg press, knee ext machine and HSC machine today for gentle strengthening while spending less time on ROM. He did not mention increased pain throughout. Required at least 1 UE for tandem balance. He will benefit from continued therapy to maximize function.    Plan: add quad and glute strengthening as appropriate nv     Precautions: s/p [facility], [facility]  Comorbidities: HTN, MI, Parkinson's       HEP:  Access Code: [email or web address]  URL: [email or web address]  Date: 01/10/2025  Prepared by: Jacqueline Ling    Exercises  - Seated Hamstring Stretch  - 3 x daily - 10 reps - 5 sec hold  - Supine Heel Slide with Strap  - 3 x daily - 10 reps - 5 sec hold  - Straight Leg Raise  - 3 x daily - 5 reps  - Seated Long Arc Quad  - 3 x daily - 10 reps        POC Expires Reeval for Medicare to be completed Unit LImit Auth Expiration Date PT/OT/STVisit Limit   25 By visit n/a N/a 25 15    Completed on visit                  TREATMENT DIARY    Auth status 1/10 1/14 1/17 1/21         approved               Visit # 1 2 3 4 5 6 7 8 9 10   Visits Remaining 14 13 12 11 10                     Manuals             L knee PROM  ALINE ALINE prone and seated ALINE supine and seated         LLE stretch  Prone quad Prone quad          L patella PROM  ALINE           LLE STM                          Neuro Re-Ed             Tandem walk             Side step             Tandem  "balance    30\"x2 ea         SLS             RB taps             Charles step over                          Ther Ex             Bike - cardio  ROM 5' ROM 6' ROM 7'         Knee flex ROM Heel slide x10 Pre TB HSC 3'           Knee ext ROM HS stretch +OP 5\"x10             HS stretch    Supine w strap 30\"x3         Calf stretch  Slant 10\"x10 Slant 10\"x10          LAQ x20 5# x30 5# x30          SLR x5            Leg press    95# pltfm 10 x30         Knee ext machine    30# x30         HSC machine    40# x30         march   X10 ea X20 ea         Step up fwd   4in x10 ea 6in x10 ea         STS                                                                 Ther Activity                                       Gait Training                                       Modalities                                                 "

## 2025-01-23 ENCOUNTER — OFFICE VISIT (OUTPATIENT)
Dept: PHYSICAL THERAPY | Facility: CLINIC | Age: 65
End: 2025-01-23
Payer: COMMERCIAL

## 2025-01-23 DIAGNOSIS — Z96.652 S/P TOTAL KNEE REPLACEMENT, LEFT: Primary | ICD-10-CM

## 2025-01-23 PROCEDURE — 97110 THERAPEUTIC EXERCISES: CPT | Performed by: PHYSICAL THERAPIST

## 2025-01-23 PROCEDURE — 97140 MANUAL THERAPY 1/> REGIONS: CPT | Performed by: PHYSICAL THERAPIST

## 2025-01-23 NOTE — PROGRESS NOTES
"Daily Note     Today's date: 2025  Patient name: Raul Smith  : 1960  MRN: [id number]  Referring provider: Bakari Larry DO  Dx:   Encounter Diagnosis     ICD-10-CM    1. S/P total knee replacement, left  Z96.652                      Subjective: No increased pain or swelling after intro to exercise last session. Today the knee is stiff and sore.      Objective: See treatment diary below      Assessment: Beginning of session pt with antalgic gait, decreased gait speed secondary to L knee stiffness and pain. He demonstrated the greatest improvement in ROM and gait tolerance following hamstring STM and stretching. Instructed to perform at home. Plan to return to strength exercises nv as able. He will benefit from continued therapy to maximize function.    Plan: add quad and glute strengthening as appropriate nv     Precautions: s/p [facility] 24, [facility]  Comorbidities: HTN, MI, Parkinson's       HEP:  Access Code: [email or web address]  URL: [email or web address]  Date: 01/10/2025  Prepared by: Jacqueline Ling    Exercises  - Seated Hamstring Stretch  - 3 x daily - 10 reps - 5 sec hold  - Supine Heel Slide with Strap  - 3 x daily - 10 reps - 5 sec hold  - Straight Leg Raise  - 3 x daily - 5 reps  - Seated Long Arc Quad  - 3 x daily - 10 reps        POC Expires Reeval for Medicare to be completed Unit LImit Auth Expiration Date PT/OT/STVisit Limit   25 By visit n/a N/a 25 15    Completed on visit                  TREATMENT DIARY    Auth status 1/10 1/14 1/17 1/21 1/23        approved               Visit # 1 2 3 4 5 6 7 8 9 10   Visits Remaining 14 13 12 11 10                     Manuals             L knee PROM  ALINE ALINE prone and seated ALINE supine and seated         LLE stretch  Prone quad Prone quad          L patella PROM  ALINE           LLE STM     Quad, hip flexor, HS                     Neuro Re-Ed             Tandem walk             Side step             Tandem balance    30\"x2 ea         SLS     " "        RB taps             Charles step over                          Ther Ex             Bike - cardio  ROM 5' ROM 6' ROM 7' ROM 7'        Knee flex ROM Heel slide x10 Pre TB HSC 3'   TB HSC +MHP 4'        Knee ext ROM HS stretch +OP 5\"x10             HS stretch    Supine w strap 30\"x3 Seated 10\"x15        Calf stretch  Slant 10\"x10 Slant 10\"x10          LAQ x20 5# x30 5# x30          SLR x5            Leg press    95# pltfm 10 x30         Knee ext machine    30# x30         HSC machine    40# x30         march   X10 ea X20 ea         Step up fwd   4in x10 ea 6in x10 ea         STS                                                                 Ther Activity                                       Gait Training                                       Modalities                                                 "

## 2025-01-28 ENCOUNTER — OFFICE VISIT (OUTPATIENT)
Dept: PHYSICAL THERAPY | Facility: CLINIC | Age: 65
End: 2025-01-28
Payer: COMMERCIAL

## 2025-01-28 DIAGNOSIS — Z96.652 S/P TOTAL KNEE REPLACEMENT, LEFT: Primary | ICD-10-CM

## 2025-01-28 PROCEDURE — 97110 THERAPEUTIC EXERCISES: CPT | Performed by: PHYSICAL THERAPIST

## 2025-01-28 NOTE — PROGRESS NOTES
"Daily Note     Today's date: 2025  Patient name: Raul Smith  : 1960  MRN: [id number]  Referring provider: Bakari Larry DO  Dx:   Encounter Diagnosis     ICD-10-CM    1. S/P total knee replacement, left  Z96.652           Start Time: 915          Subjective: Still stiff and sore.      Objective: See treatment diary below      Assessment: Beginning of session pt with antalgic gait, decreased gait speed secondary to L knee stiffness and pain. This changed mildly throughout session following mobility and strength work. He was able to perform all exercises and added lateral step ups without increased pain. Ended with MHP to the posterior knee and CP to the anterior knee. He will benefit from continued therapy to maximize function.    Plan: continue with functional strength and mobility     Precautions: s/p L TKA 24, VA  Comorbidities: HTN, MI, Parkinson's       HEP:  Access Code: [email or web address]  URL: [email or web address]  Date: 01/10/2025  Prepared by: Jacqueline Ling    Exercises  - Seated Hamstring Stretch  - 3 x daily - 10 reps - 5 sec hold  - Supine Heel Slide with Strap  - 3 x daily - 10 reps - 5 sec hold  - Straight Leg Raise  - 3 x daily - 5 reps  - Seated Long Arc Quad  - 3 x daily - 10 reps        POC Expires Reeval for Medicare to be completed Unit LImit Auth Expiration Date PT/OT/STVisit Limit   25 By visit n/a N/a 25 15    Completed on visit                  TREATMENT DIARY    Auth status 1/10 1/14 1/17 1/21 1/23 1/28       approved               Visit # 1 2 3 4 5 6 7 8 9 10   Visits Remaining 14 13 12 11 10 9                    Manuals             L knee PROM  ALINE ALINE prone and seated ALINE supine and seated  ALINE seated       LLE stretch  Prone quad Prone quad          L patella PROM  ALINE           LLE STM     Quad, hip flexor, HS                     Neuro Re-Ed             Tandem walk             Side step             Tandem balance    30\"x2 ea  30\"x2 ea       SLS        " "     RB taps             Charles step over                          Ther Ex             Bike - cardio  ROM 5' ROM 6' ROM 7' ROM 7' ROM 7'       Knee flex ROM Heel slide x10 Pre TB HSC 3'   TB HSC +MHP 4'        Knee ext ROM HS stretch +OP 5\"x10             HS stretch    Supine w strap 30\"x3 Seated 10\"x15 Seated 10\"x10       Calf stretch  Slant 10\"x10 Slant 10\"x10          LAQ x20 5# x30 5# x30          SLR x5            Leg press    95# pltfm 10 x30  95# pltfm 9 x30       Knee ext machine    30# x30  30# x30       HSC machine    40# x30  40# x30       march   X10 ea X20 ea         Step up fwd   4in x10 ea 6in x10 ea  6in x20 ea       Step up lat      6in x10 ea       STS                                                                 Ther Activity                                       Gait Training                                       Modalities                                                 "

## 2025-01-30 ENCOUNTER — OFFICE VISIT (OUTPATIENT)
Dept: PHYSICAL THERAPY | Facility: CLINIC | Age: 65
End: 2025-01-30
Payer: COMMERCIAL

## 2025-01-30 DIAGNOSIS — Z96.652 S/P TOTAL KNEE REPLACEMENT, LEFT: Primary | ICD-10-CM

## 2025-01-30 PROCEDURE — 97140 MANUAL THERAPY 1/> REGIONS: CPT | Performed by: PHYSICAL THERAPIST

## 2025-01-30 PROCEDURE — 97110 THERAPEUTIC EXERCISES: CPT | Performed by: PHYSICAL THERAPIST

## 2025-01-30 NOTE — PROGRESS NOTES
"Daily Note     Today's date: 2025  Patient name: Raul Smith  : 1960  MRN: [id number]  Referring provider: Bakari Larry DO  Dx:   Encounter Diagnosis     ICD-10-CM    1. S/P total knee replacement, left  Z96.652           Start Time: 915          Subjective: No new complaints or updates.      Objective: See treatment diary below      Assessment: Hamstring hypertonic. Added prone hang with MHP on the hamstring. He felt enough of a stretch with the prone hang and did not require an ankle weight. Visible improvement in knee ext ROM after 5 min prone hang. He will benefit from further work into this to improve ext ROM. He will benefit from continued therapy to maximize function.    Plan: continue with functional strength and mobility     Precautions: s/p L TKA 24, VA  Comorbidities: HTN, MI, Parkinson's       HEP:  Access Code: [email or web address]  URL: [email or web address]  Date: 01/10/2025  Prepared by: Jacqueline Ling    Exercises  - Seated Hamstring Stretch  - 3 x daily - 10 reps - 5 sec hold  - Supine Heel Slide with Strap  - 3 x daily - 10 reps - 5 sec hold  - Straight Leg Raise  - 3 x daily - 5 reps  - Seated Long Arc Quad  - 3 x daily - 10 reps        POC Expires Reeval for Medicare to be completed Unit LImit Auth Expiration Date PT/OT/STVisit Limit   25 By visit n/a N/a 25 15    Completed on visit                  TREATMENT DIARY    Auth status 1/10 1/14 1/17 1/21 1/23 1/28 1/30      approved               Visit # 1 2 3 4 5 6 7 8 9 10   Visits Remaining 14 13 12 11 10 9 8                   Manuals             L knee PROM  ALINE ALINE prone and seated ALINE supine and seated  ALINE seated ALINE seated flex, supine ext      LLE stretch  Prone quad Prone quad          L patella PROM  ALINE           LLE STM     Quad, hip flexor, HS  HS, adductors                   Neuro Re-Ed             Tandem walk             Side step             Tandem balance    30\"x2 ea  30\"x2 ea       SLS             RB " "taps             Charles step over                          Ther Ex             Bike - cardio  ROM 5' ROM 6' ROM 7' ROM 7' ROM 7' ROM 7'      Knee flex ROM Heel slide x10 Pre TB HSC 3'   TB HSC +MHP 4'        Knee ext ROM HS stretch +OP 5\"x10       Prone hang MHP on HS, no ankle weight 5'      HS stretch    Supine w strap 30\"x3 Seated 10\"x15 Seated 10\"x10 Supine w strap 30\"x3      Calf stretch  Slant 10\"x10 Slant 10\"x10          LAQ x20 5# x30 5# x30          SLR x5            Leg press    95# pltfm 10 x30  95# pltfm 9 x30 95# pltfm 8 x30      Knee ext machine    30# x30  30# x30 35# x30      HSC machine    40# x30  40# x30 35# x30      march   X10 ea X20 ea         Step up fwd   4in x10 ea 6in x10 ea  6in x20 ea       Step up lat      6in x10 ea       STS                                                                 Ther Activity                                       Gait Training                                       Modalities                                                 "

## 2025-02-04 ENCOUNTER — OFFICE VISIT (OUTPATIENT)
Dept: PHYSICAL THERAPY | Facility: CLINIC | Age: 65
End: 2025-02-04
Payer: COMMERCIAL

## 2025-02-04 DIAGNOSIS — Z96.652 S/P TOTAL KNEE REPLACEMENT, LEFT: Primary | ICD-10-CM

## 2025-02-04 PROCEDURE — 97110 THERAPEUTIC EXERCISES: CPT | Performed by: PHYSICAL THERAPIST

## 2025-02-04 PROCEDURE — 97140 MANUAL THERAPY 1/> REGIONS: CPT | Performed by: PHYSICAL THERAPIST

## 2025-02-04 NOTE — PROGRESS NOTES
Daily Note     Today's date: 2025  Patient name: Raul Smith  : 1960  MRN: [id number]  Referring provider: Bakari Larry DO  Dx:   Encounter Diagnosis     ICD-10-CM    1. S/P total knee replacement, left  Z96.652           Start Time: 916          Subjective: No longer using the SPC and reports feeling stable, no falls.      Objective: See treatment diary below      Assessment: FOTO score improvement from 31 at IE to 54 today. He has surpassed his FOTO score goal of 46/100 at this time. Visible improvement in knee flexion and ext ROM noted today, especially with added tib distraction for flexion and prone hang for ext. He was able to tolerate increased load of knee strengthening exercises. Ended with fwd tandem walk and bwd walk with SPC for assist. He was appropriately challenged with this. SBA provided during dynamic balance for safety. He will benefit from continued therapy to maximize function.    Plan: continue with functional strength and mobility     Precautions: s/p L TKA 24, VA  Comorbidities: HTN, MI, Parkinson's       HEP:  Access Code: [email or web address]  URL: [email or web address]  Date: 01/10/2025  Prepared by: Jacqueline Ling    Exercises  - Seated Hamstring Stretch  - 3 x daily - 10 reps - 5 sec hold  - Supine Heel Slide with Strap  - 3 x daily - 10 reps - 5 sec hold  - Straight Leg Raise  - 3 x daily - 5 reps  - Seated Long Arc Quad  - 3 x daily - 10 reps        POC Expires Reeval for Medicare to be completed Unit LImit Auth Expiration Date PT/OT/STVisit Limit   25 By visit n/a N/a 25 15    Completed on visit                  TREATMENT DIARY    Auth status 1/10 1/14 1/17 1/21 1/23 1/28 1/30 2/4     approved               Visit # 1 2 3 4 5 6 7 8 9 10   Visits Remaining 14 13 12 11 10 9 8                   Manuals             RE             FOTO        ALINE     L knee PROM  ALINE ALINE prone and seated ALINE supine and seated  ALINE seated ALINE seated flex, supine ext ALINE     LLE  "stretch  Prone quad Prone quad          L patella PROM  ALINE           LLE STM     Quad, hip flexor, HS  HS, adductors                   Neuro Re-Ed             Tandem walk        Fwd 24ft x2 laps with SPC     Bwd walk        24ft x2 laps with SPC     Tandem balance    30\"x2 ea  30\"x2 ea       SLS             RB taps             Charles step over                          Ther Ex             Bike - cardio  ROM 5' ROM 6' ROM 7' ROM 7' ROM 7' ROM 7' 6'     Knee flex ROM Heel slide x10 Pre TB HSC 3'   TB HSC +MHP 4'        Knee ext ROM HS stretch +OP 5\"x10       Prone hang MHP on HS, no ankle weight 5' Prone hang MHP on HS, 2# 6'     HS stretch    Supine w strap 30\"x3 Seated 10\"x15 Seated 10\"x10 Supine w strap 30\"x3      Calf stretch  Slant 10\"x10 Slant 10\"x10          LAQ x20 5# x30 5# x30          SLR x5            Leg press    95# pltfm 10 x30  95# pltfm 9 x30 95# pltfm 8 x30 105# pltfm 8 x30     Knee ext machine    30# x30  30# x30 35# x30 45# x30     HSC machine    40# x30  40# x30 35# x30 45# x30     march   X10 ea X20 ea         Step up fwd   4in x10 ea 6in x10 ea  6in x20 ea  8in x20 ea     Step up lat      6in x10 ea  8in x20 ea     STS                                                                 Ther Activity                                       Gait Training                                       Modalities                                                 "

## 2025-02-06 ENCOUNTER — APPOINTMENT (OUTPATIENT)
Dept: PHYSICAL THERAPY | Facility: CLINIC | Age: 65
End: 2025-02-06
Payer: COMMERCIAL

## 2025-02-07 ENCOUNTER — OFFICE VISIT (OUTPATIENT)
Dept: PHYSICAL THERAPY | Facility: CLINIC | Age: 65
End: 2025-02-07
Payer: COMMERCIAL

## 2025-02-07 DIAGNOSIS — Z96.652 S/P TOTAL KNEE REPLACEMENT, LEFT: Primary | ICD-10-CM

## 2025-02-07 PROCEDURE — 97140 MANUAL THERAPY 1/> REGIONS: CPT | Performed by: PHYSICAL THERAPIST

## 2025-02-07 PROCEDURE — 97110 THERAPEUTIC EXERCISES: CPT | Performed by: PHYSICAL THERAPIST

## 2025-02-07 NOTE — PROGRESS NOTES
Daily Note     Today's date: 2025  Patient name: Raul Smith  : 1960  MRN: [id number]  Referring provider: Bakari Larry DO  Dx:   Encounter Diagnosis     ICD-10-CM    1. S/P total knee replacement, left  Z96.652                      Subjective: Still gets stiff in the knee with prolonged inactivity      Objective: See treatment diary below      Assessment: Visible improvement in knee flexion and ext ROM noted today, especially with added tib distraction for flexion and prone hang for ext. Added fwd step down and bwd step up for knee flex/ext training in SLS. He will benefit from continued therapy to maximize function.    Plan: continue with functional strength and mobility     Precautions: s/p L TKA 24, VA  Comorbidities: HTN, MI, Parkinson's       HEP:  Access Code: [email or web address]  URL: [email or web address]  Date: 01/10/2025  Prepared by: Jacqueline Ling    Exercises  - Seated Hamstring Stretch  - 3 x daily - 10 reps - 5 sec hold  - Supine Heel Slide with Strap  - 3 x daily - 10 reps - 5 sec hold  - Straight Leg Raise  - 3 x daily - 5 reps  - Seated Long Arc Quad  - 3 x daily - 10 reps        POC Expires Reeval for Medicare to be completed Unit LImit Auth Expiration Date PT/OT/STVisit Limit   25 By visit n/a N/a 25 15    Completed on visit                  TREATMENT DIARY    Auth status 1/10 1/14 1/17 1/21 1/23 1/28 1/30 2/4 2/7    approved               Visit # 1 2 3 4 5 6 7 8 9 10   Visits Remaining 14 13 12 11 10 9 8 7 6                 Manuals             RE             FOTO        ALINE     L knee PROM  ALINE ALINE prone and seated ALINE supine and seated  ALINE seated ALINE seated flex, supine ext ALINE ALINE    Tib distraction         prone    LLE stretch  Prone quad Prone quad          L patella PROM  ALINE           LLE STM     Quad, hip flexor, HS  HS, adductors                   Neuro Re-Ed             Tandem walk        Fwd 24ft x2 laps with SPC     Bwd walk        24ft x2 laps with SPC    "  Tandem balance    30\"x2 ea  30\"x2 ea       SLS             RB taps             Charles step over                          Ther Ex             Bike - cardio  ROM 5' ROM 6' ROM 7' ROM 7' ROM 7' ROM 7' 6' 6'    Knee flex ROM Heel slide x10 Pre TB HSC 3'   TB HSC +MHP 4'        Knee ext ROM HS stretch +OP 5\"x10       Prone hang MHP on HS, no ankle weight 5' Prone hang MHP on HS, 2# 6' Prone hang MHP on HS, 2# 5'    HS stretch    Supine w strap 30\"x3 Seated 10\"x15 Seated 10\"x10 Supine w strap 30\"x3      Calf stretch  Slant 10\"x10 Slant 10\"x10          LAQ x20 5# x30 5# x30          SLR x5            Leg press    95# pltfm 10 x30  95# pltfm 9 x30 95# pltfm 8 x30 105# pltfm 8 x30 105# pltfm 8 x30    Knee ext machine    30# x30  30# x30 35# x30 45# x30 45# x30    HSC machine    40# x30  40# x30 35# x30 45# x30 45# x30    march   X10 ea X20 ea         Step up fwd   4in x10 ea 6in x10 ea  6in x20 ea  8in x20 ea     Step up lat      6in x10 ea  8in x20 ea 8in x20 ea    Step dn fwd, up bwd         4in x20   6in x20    STS                                                                 Ther Activity                                       Gait Training                                       Modalities                                                 "

## 2025-02-11 ENCOUNTER — APPOINTMENT (OUTPATIENT)
Dept: PHYSICAL THERAPY | Facility: CLINIC | Age: 65
End: 2025-02-11
Payer: COMMERCIAL

## 2025-02-13 ENCOUNTER — APPOINTMENT (OUTPATIENT)
Dept: PHYSICAL THERAPY | Facility: CLINIC | Age: 65
End: 2025-02-13
Payer: COMMERCIAL

## 2025-02-18 ENCOUNTER — OFFICE VISIT (OUTPATIENT)
Dept: PHYSICAL THERAPY | Facility: CLINIC | Age: 65
End: 2025-02-18
Payer: COMMERCIAL

## 2025-02-18 DIAGNOSIS — Z96.652 S/P TOTAL KNEE REPLACEMENT, LEFT: Primary | ICD-10-CM

## 2025-02-18 PROCEDURE — 97110 THERAPEUTIC EXERCISES: CPT | Performed by: PHYSICAL THERAPIST

## 2025-02-18 NOTE — PROGRESS NOTES
Daily Note     Today's date: 2025  Patient name: Raul Smith  : 1960  MRN: [id number]  Referring provider: Bakari Larry DO  Dx:   Encounter Diagnosis     ICD-10-CM    1. S/P total knee replacement, left  Z96.652           Start Time: 915          Subjective: Still gets stiff in the knee with prolonged inactivity      Objective: See treatment diary below      Assessment: Continues with antalgic gait secondary to ROM restriction and pain with first few steps after prolonged sitting. Transitioning to independent stretching today with ROM beginning of session. He did well with knee strengthening and tolerated increased load and volume well. Re-introduced tandem and side stepping in the parallel bars, but cued to use as little UE support as possible. He demonstrates fair hip and knee flexion to clear medium height dawn during lateral step overs. Ended with CP for reduced post-exercise soreness. He will benefit from continued therapy to maximize function.    Plan: continue with functional strength and mobility; RE next session     Precautions: s/p L TKA 24, VA  Comorbidities: HTN, MI, Parkinson's       HEP:  Access Code: [email or web address]  URL: [email or web address]  Date: 01/10/2025  Prepared by: Jacqueline Ling    Exercises  - Seated Hamstring Stretch  - 3 x daily - 10 reps - 5 sec hold  - Supine Heel Slide with Strap  - 3 x daily - 10 reps - 5 sec hold  - Straight Leg Raise  - 3 x daily - 5 reps  - Seated Long Arc Quad  - 3 x daily - 10 reps        POC Expires Reeval for Medicare to be completed Unit LImit Auth Expiration Date PT/OT/STVisit Limit   25 By visit n/a N/a 25 15    Completed on visit                  TREATMENT DIARY    Auth status 1/10 1/14 1/17 1/21 1/23 1/28 1/30 2/4 2/7 2/18   approved               Visit # 1 2 3 4 5 6 7 8 9 10   Visits Remaining 14 13 12 11 10 9 8 7 6 5                Manuals             RE             FOTO        ALINE     L knee PROM  ALINE ALINE prone  "and seated ALINE supine and seated  ALINE seated ALINE seated flex, supine ext ALINE ALINE    Tib distraction         prone    LLE stretch  Prone quad Prone quad          L patella PROM  ALINE           LLE STM     Quad, hip flexor, HS  HS, adductors                   Neuro Re-Ed             Tandem walk        Fwd 24ft x2 laps with SPC  In // x4 laps f-b   Bwd walk        24ft x2 laps with SPC  In // x4 laps   Tandem balance    30\"x2 ea  30\"x2 ea       SLS             RB taps             Charles step over                          Ther Ex             Bike - cardio  ROM 5' ROM 6' ROM 7' ROM 7' ROM 7' ROM 7' 6' 6' 6'   Knee flex ROM Heel slide x10 Pre TB HSC 3'   TB HSC +MHP 4'     Knee flex on chair 5\"x15   Knee ext ROM HS stretch +OP 5\"x10       Prone hang MHP on HS, no ankle weight 5' Prone hang MHP on HS, 2# 6' Prone hang MHP on HS, 2# 5' Seated HS stretch 5\"x15   HS stretch    Supine w strap 30\"x3 Seated 10\"x15 Seated 10\"x10 Supine w strap 30\"x3      Calf stretch  Slant 10\"x10 Slant 10\"x10          LAQ x20 5# x30 5# x30          SLR x5            Leg press    95# pltfm 10 x30  95# pltfm 9 x30 95# pltfm 8 x30 105# pltfm 8 x30 105# pltfm 8 x30 125# pltfm 8 2x20   Knee ext machine    30# x30  30# x30 35# x30 45# x30 45# x30 45# 2x20   HSC machine    40# x30  40# x30 35# x30 45# x30 45# x30 45# 2x20   march   X10 ea X20 ea         Step up fwd   4in x10 ea 6in x10 ea  6in x20 ea  8in x20 ea     Step up lat      6in x10 ea  8in x20 ea 8in x20 ea 8in x20 ea   Step dn fwd, up bwd         4in x20   6in x20 6in x20   STS             Lat charles step over          Med height x20                                          Ther Activity                                       Gait Training                                       Modalities                                                 "

## 2025-02-20 ENCOUNTER — EVALUATION (OUTPATIENT)
Dept: PHYSICAL THERAPY | Facility: CLINIC | Age: 65
End: 2025-02-20
Payer: COMMERCIAL

## 2025-02-20 DIAGNOSIS — Z96.652 S/P TOTAL KNEE REPLACEMENT, LEFT: Primary | ICD-10-CM

## 2025-02-20 PROCEDURE — 97140 MANUAL THERAPY 1/> REGIONS: CPT | Performed by: PHYSICAL THERAPIST

## 2025-02-20 PROCEDURE — 97110 THERAPEUTIC EXERCISES: CPT | Performed by: PHYSICAL THERAPIST

## 2025-02-20 NOTE — PROGRESS NOTES
PT Re-Evaluation     Today's date: 2025  Patient name: Raul Smith  : 1960  MRN: [id number]  Referring provider: Bakari Larry DO  Dx:   Encounter Diagnosis     ICD-10-CM    1. S/P total knee replacement, left  Z96.652                      Assessment  Impairments: abnormal muscle firing, abnormal or restricted ROM, activity intolerance, impaired balance, impaired physical strength, lacks appropriate home exercise program, pain with function and poor body mechanics    Assessment details: aRul Smith is a pleasant 64 y.o. male who presents with L knee pain 4 wks s[facility]  he has L knee swelling, ROM restriction, weakness and balance difficulties resulting in gait dysfunction and difficulty performing household tasks, ADLs and caring for his elderly father..  No further referral appears necessary at this time based upon examination results.  I expect he will improve in 10-12 wks with physical therapy and home program.        RE: 25  Raul Smith has attended physical therapy for 11 visits. In this time, they have made significant improvements in symptoms and function, as noted by subjective report, improved balance, ROM, strength, flexibility and activity tolerance. They have made positive goal progress with FOTO score improvement from 31 at initial evaluation to 54 currently. They do continue to have impairments in pain, ROM, strength, balance, flexibility and activity tolerance. Recommend continued skilled PT in order to maximize function.      Barriers to therapy: n/a  Understanding of Dx/Px/POC: good     Prognosis: good    Goals  ST.  Independent with HEP in 2 weeks - met  2. Decrease pain by 50% in 3 wks - met  3.  Increase L knee ext AROM to 3 degrees in 3 weeks - met  4. Increase L knee flex AROM to 105 deg in 3 wks. - met    LT. Achieve FOTO score expected in 6 weeks - met  2.  Able to ambulate without a SPC in 6 weeks - met  3.  Strength = 5/5 L  knee ext in 6 weeks - progressing      Plan  Patient would benefit from: skilled physical therapy  Planned modality interventions: cryotherapy, electrical stimulation/Russian stimulation and thermotherapy: hydrocollator packs    Planned therapy interventions: abdominal trunk stabilization, manual therapy, neuromuscular re-education, therapeutic activities, therapeutic exercise, body mechanics training and home exercise program    Frequency: 2x week  Duration in weeks: 6  Plan of Care beginning date: 2/20/2025  Plan of Care expiration date: 4/4/2025  Treatment plan discussed with: patient  Plan details: RE in 6 weeks      Subjective Evaluation    History of Present Illness  Mechanism of injury: Pt is 4 weeks s/p L TKA done on 12/19/24 at the [facility]. He had about 2 weeks of home therapy, working on flexibility, stairs and light strengthening. He is ambulating with a SPC and he has returned to driving.    L knee pain is 1-10/10. Worse at the end of the day. Agg with squatting (STS). Eased with tramadol, ice. He does have numbness to the surface of the knee. He has had near-instances of the knee giving out, but he has caught himself. He does not have a history of falls in the past year, but does feel unsteady standing and walking. Denies calf pain, redness, swelling.     He lives with his wife in [place] home and stairs to enter. He manages this one step at a time with railing and cane support. He is independent with ADLs, but has difficulty with getting in/out of the shower and putting on socks and shoes. In his free time he enjoys doing wood-working, working on vehicles, caring for his dad. He does have adequate upper body strength, but he has concern for his steadiness on his feet, especially when helping his dad with tasks.      RE: 2/20/25  Pt is 9 wks s/p L TKA. Overall doing better since starting therapy. He is ambulating without assistive device and slowly returning to pre-surgery activities. He  continues to have pain and stiffness after periods of inactivity.  Patient Goals  Patient goals for therapy: decreased edema, decreased pain, improved balance, increased motion, return to sport/leisure activities, independence with ADLs/IADLs and increased strength        Objective     Observations     Additional Observation Details  Incision closed with appropriate healing, moderate dry flaking skin about the knee; moderate edema of the knee    Palpation   Left   Tenderness of the lateral gastrocnemius and medial gastrocnemius.     Active Range of Motion   Left Knee   Flexion: 106 degrees   Extension: 3 degrees     Right Knee   Flexion: 120 degrees   Extension: 3 degrees     Mobility   Patellar Mobility:   Left Knee   Hypomobile: left medial, left lateral, left superior and left inferior    Strength/Myotome Testing     Left Hip   Planes of Motion   Flexion: 4+    Right Hip   Planes of Motion   Flexion: 4+    Left Knee   Flexion: 4+  Extension: 4+  Quadriceps contraction: fair    Right Knee   Flexion: 4+  Extension: 4+  Quadriceps contraction: fair    Additional Strength Details  L active SLR to 90 deg    Functional Assessment        Comments  Gait: antalgic  Tandem walk: fwd/bwd with SPC, unsteady, SBA provided, increased time to perform             Prec[facility]: [facility]  Comorbidities: HTN, MI, Parkinson's       HEP:  Access Code: [email or web address]  URL: [email or web address]  Date: 02/20/2025  Prepared by: Jacqueline Ling    Exercises  - Knee flexion stretch on chair  - 3 sets - 10 reps - 5 sec hold  - Seated Hamstring Stretch  - 3 sets - 10 reps - 5 sec hold  - Tandem Walking with Counter Support  - 5 sets  - Single Leg Stance with Support  - 2 sets - 30 sec hold  - Step Up  - 20 reps      POC Expires Reeval for Medicare to be completed Unit LImit Auth Expiration Date PT/OT/STVisit Limit   2/21/25 By visit n/a N/a 5/7/25 15    Completed on visit                  TREATMENT DIARY    Auth status  "1/10 1/14 1/17 1/21 1/23 1/28 1/30 2/4 2/7 2/18 2/20   approved               Visit # 1 2 3 4 5 6 7 8 9 10 11   Visits Remaining 14 13 12 11 10 9 8 7 6 5 4                 Manuals              RE           ALINE   FOTO        ALINE      L knee PROM  ALINE ALINE prone and seated ALINE supine and seated  ALINE seated ALIEN seated flex, supine ext ALINE ALINE     Tib distraction         prone     LLE stretch  Prone quad Prone quad           L patella PROM  ALINE            LLE STM     Quad, hip flexor, HS  HS, adductors                     Neuro Re-Ed              Tandem walk        Fwd 24ft x2 laps with SPC  In // x4 laps f-b F-b 24ft x2 laps with SPC   Bwd walk        24ft x2 laps with SPC  In // x4 laps    Tandem balance    30\"x2 ea  30\"x2 ea        SLS              RB taps           F-b, s-s x30 ea   Charles step over                            Ther Ex              Bike - cardio  ROM 5' ROM 6' ROM 7' ROM 7' ROM 7' ROM 7' 6' 6' 6' 6'   Knee flex ROM Heel slide x10 Pre TB HSC 3'   TB HSC +MHP 4'     Knee flex on chair 5\"x15 Knee flex on chair 5\"x15   Knee ext ROM HS stretch +OP 5\"x10       Prone hang MHP on HS, no ankle weight 5' Prone hang MHP on HS, 2# 6' Prone hang MHP on HS, 2# 5' Seated HS stretch 5\"x15 Seated HS stretch 5\"x15   HS stretch    Supine w strap 30\"x3 Seated 10\"x15 Seated 10\"x10 Supine w strap 30\"x3       Calf stretch  Slant 10\"x10 Slant 10\"x10           LAQ x20 5# x30 5# x30           SLR x5             Leg press    95# pltfm 10 x30  95# pltfm 9 x30 95# pltfm 8 x30 105# pltfm 8 x30 105# pltfm 8 x30 125# pltfm 8 2x20 125# pltfm 8 2x20   Knee ext machine    30# x30  30# x30 35# x30 45# x30 45# x30 45# 2x20 45# 2x20   HSC machine    40# x30  40# x30 35# x30 45# x30 45# x30 45# 2x20 45# 2x20   TKE           66# 5\"x20   march   X10 ea X20 ea          Step up fwd   4in x10 ea 6in x10 ea  6in x20 ea  8in x20 ea      Step up lat      6in x10 ea  8in x20 ea 8in x20 ea 8in x20 ea    Step dn fwd, up bwd         4in x20   6in x20 6in x20  "   STS              Lat dawn step over          Med height x20                                              Ther Activity                                          Gait Training                                          Modalities

## 2025-02-20 NOTE — LETTER
2025    Bakari Larry DO  [address] [facility] [address]    Patient: Raul Smith   YOB: 1960   Date of Visit: 2025     Encounter Diagnosis     ICD-10-CM    1. S/P total knee replacement, left  Z96.652           Dear Dr. Larry:    Thank you for your recent referral of Raul Smith. Please review the attached evaluation summary from Raul's recent visit.     Please verify that you agree with the plan of care by signing the attached order.     If you have any questions or concerns, please do not hesitate to call.     I sincerely appreciate the opportunity to share in the care of one of your patients and hope to have another opportunity to work with you in the near future.       Sincerely,    Jacqueline Ling, PT      Referring Provider:      I certify that I have read the below Plan of Care and certify the need for these services furnished under this plan of treatment while under my care.                    Bakari Larry DO  [address] [facility] [address]  Via Fax: [phone]          PT Re-Evaluation     Today's date: 2025  Patient name: Raul Smith  : 1960  MRN: [id number]  Referring provider: Bakari Larry DO  Dx:   Encounter Diagnosis     ICD-10-CM    1. S/P total knee replacement, left  Z96.652                      Assessment  Impairments: abnormal muscle firing, abnormal or restricted ROM, activity intolerance, impaired balance, impaired physical strength, lacks appropriate home exercise program, pain with function and poor body mechanics    Assessment details: Raul Smith is a pleasant 64 y.o. male who presents with L knee pain 4 wks s[facility]  he has L knee swelling, ROM restriction, weakness and balance difficulties resulting in gait dysfunction and difficulty performing household tasks, ADLs and caring for his elderly father..  No further referral appears necessary at this time based upon  examination results.  I expect he will improve in 10-12 wks with physical therapy and home program.        RE: 25  Raul Smith has attended physical therapy for 11 visits. In this time, they have made significant improvements in symptoms and function, as noted by subjective report, improved balance, ROM, strength, flexibility and activity tolerance. They have made positive goal progress with FOTO score improvement from 31 at initial evaluation to 54 currently. They do continue to have impairments in pain, ROM, strength, balance, flexibility and activity tolerance. Recommend continued skilled PT in order to maximize function.      Barriers to therapy: n/a  Understanding of Dx/Px/POC: good     Prognosis: good    Goals  ST.  Independent with HEP in 2 weeks - met  2. Decrease pain by 50% in 3 wks - met  3.  Increase L knee ext AROM to 3 degrees in 3 weeks - met  4. Increase L knee flex AROM to 105 deg in 3 wks. - met    LT. Achieve FOTO score expected in 6 weeks - met  2.  Able to ambulate without a SPC in 6 weeks - met  3.  Strength = 5/5 L knee ext in 6 weeks - progressing      Plan  Patient would benefit from: skilled physical therapy  Planned modality interventions: cryotherapy, electrical stimulation/Russian stimulation and thermotherapy: hydrocollator packs    Planned therapy interventions: abdominal trunk stabilization, manual therapy, neuromuscular re-education, therapeutic activities, therapeutic exercise, body mechanics training and home exercise program    Frequency: 2x week  Duration in weeks: 6  Plan of Care beginning date: 2025  Plan of Care expiration date: 2025  Treatment plan discussed with: patient  Plan details: RE in 6 weeks      Subjective Evaluation    History of Present Illness  Mechanism of injury: Pt is 4 weeks s/p L TKA done on 24 at the [facility]. He had about 2 weeks of home therapy, working on flexibility, stairs and light strengthening. He is ambulating  with a SPC and he has returned to driving.    L knee pain is 1-10/10. Worse at the end of the day. Agg with squatting (STS). Eased with tramadol, ice. He does have numbness to the surface of the knee. He has had near-instances of the knee giving out, but he has caught himself. He does not have a history of falls in the past year, but does feel unsteady standing and walking. Denies calf pain, redness, swelling.     He lives with his wife in [place]style home and stairs to enter. He manages this one step at a time with railing and cane support. He is independent with ADLs, but has difficulty with getting in/out of the shower and putting on socks and shoes. In his free time he enjoys doing wood-working, working on vehicles, caring for his dad. He does have adequate upper body strength, but he has concern for his steadiness on his feet, especially when helping his dad with tasks.      RE: 2/20/25  Pt is 9 wks s/p L TKA. Overall doing better since starting therapy. He is ambulating without assistive device and slowly returning to pre-surgery activities. He continues to have pain and stiffness after periods of inactivity.  Patient Goals  Patient goals for therapy: decreased edema, decreased pain, improved balance, increased motion, return to sport/leisure activities, independence with ADLs/IADLs and increased strength        Objective     Observations     Additional Observation Details  Incision closed with appropriate healing, moderate dry flaking skin about the knee; moderate edema of the knee    Palpation   Left   Tenderness of the lateral gastrocnemius and medial gastrocnemius.     Active Range of Motion   Left Knee   Flexion: 106 degrees   Extension: 3 degrees     Right Knee   Flexion: 120 degrees   Extension: 3 degrees     Mobility   Patellar Mobility:   Left Knee   Hypomobile: left medial, left lateral, left superior and left inferior    Strength/Myotome Testing     Left Hip   Planes of Motion   Flexion:  "4+    Right Hip   Planes of Motion   Flexion: 4+    Left Knee   Flexion: 4+  Extension: 4+  Quadriceps contraction: fair    Right Knee   Flexion: 4+  Extension: 4+  Quadriceps contraction: fair    Additional Strength Details  L active SLR to 90 deg    Functional Assessment        Comments  Gait: antalgic  Tandem walk: fwd/bwd with SPC, unsteady, SBA provided, increased time to perform             Prec[facility]: [facility]  Comorbidities: HTN, MI, Parkinson's       HEP:  Access Code: [email or web address]  URL: [email or web address]  Date: 02/20/2025  Prepared by: Jacqueline Ling    Exercises  - Knee flexion stretch on chair  - 3 sets - 10 reps - 5 sec hold  - Seated Hamstring Stretch  - 3 sets - 10 reps - 5 sec hold  - Tandem Walking with Counter Support  - 5 sets  - Single Leg Stance with Support  - 2 sets - 30 sec hold  - Step Up  - 20 reps      POC Expires Reeval for Medicare to be completed Unit LImit Auth Expiration Date PT/OT/STVisit Limit   2/21/25 By visit n/a N/a 5/7/25 15    Completed on visit                  TREATMENT DIARY    Auth status 1/10 1/14 1/17 1/21 1/23 1/28 1/30 2/4 2/7 2/18 2/20   approved               Visit # 1 2 3 4 5 6 7 8 9 10 11   Visits Remaining 14 13 12 11 10 9 8 7 6 5 4                 Manuals              RE           ALINE   FOTO        ALINE      L knee PROM  ALINE ALINE prone and seated ALINE supine and seated  ALINE seated ALINE seated flex, supine ext ALINE ALINE     Tib distraction         prone     LLE stretch  Prone quad Prone quad           L patella PROM  ALINE            LLE STM     Quad, hip flexor, HS  HS, adductors                     Neuro Re-Ed              Tandem walk        Fwd 24ft x2 laps with SPC  In // x4 laps f-b F-b 24ft x2 laps with SPC   Bwd walk        24ft x2 laps with SPC  In // x4 laps    Tandem balance    30\"x2 ea  30\"x2 ea        SLS              RB taps           F-b, s-s x30 ea   Charles step over                            Ther Ex              Bike - cardio  ROM 5' " "ROM 6' ROM 7' ROM 7' ROM 7' ROM 7' 6' 6' 6' 6'   Knee flex ROM Heel slide x10 Pre TB HSC 3'   TB HSC +MHP 4'     Knee flex on chair 5\"x15 Knee flex on chair 5\"x15   Knee ext ROM HS stretch +OP 5\"x10       Prone hang MHP on HS, no ankle weight 5' Prone hang MHP on HS, 2# 6' Prone hang MHP on HS, 2# 5' Seated HS stretch 5\"x15 Seated HS stretch 5\"x15   HS stretch    Supine w strap 30\"x3 Seated 10\"x15 Seated 10\"x10 Supine w strap 30\"x3       Calf stretch  Slant 10\"x10 Slant 10\"x10           LAQ x20 5# x30 5# x30           SLR x5             Leg press    95# pltfm 10 x30  95# pltfm 9 x30 95# pltfm 8 x30 105# pltfm 8 x30 105# pltfm 8 x30 125# pltfm 8 2x20 125# pltfm 8 2x20   Knee ext machine    30# x30  30# x30 35# x30 45# x30 45# x30 45# 2x20 45# 2x20   HSC machine    40# x30  40# x30 35# x30 45# x30 45# x30 45# 2x20 45# 2x20   TKE           66# 5\"x20   march   X10 ea X20 ea          Step up fwd   4in x10 ea 6in x10 ea  6in x20 ea  8in x20 ea      Step up lat      6in x10 ea  8in x20 ea 8in x20 ea 8in x20 ea    Step dn fwd, up bwd         4in x20   6in x20 6in x20    STS              Lat dawn step over          Med height x20                                              Ther Activity                                          Gait Training                                          Modalities                                                           "

## 2025-02-25 ENCOUNTER — OFFICE VISIT (OUTPATIENT)
Dept: PHYSICAL THERAPY | Facility: CLINIC | Age: 65
End: 2025-02-25
Payer: COMMERCIAL

## 2025-02-25 DIAGNOSIS — Z96.652 S/P TOTAL KNEE REPLACEMENT, LEFT: Primary | ICD-10-CM

## 2025-02-25 PROCEDURE — 97112 NEUROMUSCULAR REEDUCATION: CPT | Performed by: PHYSICAL THERAPIST

## 2025-02-25 PROCEDURE — 97110 THERAPEUTIC EXERCISES: CPT | Performed by: PHYSICAL THERAPIST

## 2025-02-25 NOTE — PROGRESS NOTES
Daily Note     Today's date: 2025  Patient name: Raul Smith  : 1960  MRN: [id number]  Referring provider: Baakri Larry DO  Dx:   Encounter Diagnosis     ICD-10-CM    1. S/P total knee replacement, left  Z96.652           Start Time: 918          Subjective: No new complaints      Objective: See treatment diary below      Assessment: Continues with antalgic gait with first few steps after being inactive. He is independent with mobility exercises and appropriately challenged with strengthening. He required assist from SPC and SBA from PT during tandem fwd and bwd walking and karaoke cross-stepping. Ended with CP. He will benefit from continued PT to maximize function.      Plan:  continue with hip/knee strengthening and dynamic balance     Precautions: [facility]/p L [facility] 24, [facility]  Comorbidities: HTN, MI, Parkinson's       HEP:  Access Code: [email or web address]  URL: [email or web address]  Date: 2025  Prepared by: Jacqueline Ling    Exercises  - Knee flexion stretch on chair  - 3 sets - 10 reps - 5 sec hold  - Seated Hamstring Stretch  - 3 sets - 10 reps - 5 sec hold  - Tandem Walking with Counter Support  - 5 sets  - Single Leg Stance with Support  - 2 sets - 30 sec hold  - Step Up  - 20 reps      POC Expires Reeval for Medicare to be completed Unit LImit Auth Expiration Date PT/OT/STVisit Limit   25 By visit n/a N/a 25 15    Completed on visit                  TREATMENT DIARY    Auth status 1/10 1/14 1/17 1/21 1/23 1/28 1/30 2/   approved               Visit # 1 2 3 4 5 6 7 8 9 10 11 12   Visits Remaining 14 13 12 11 10 9 8 7 6 5 4 3                  Manuals               RE           ALINE    FOTO        ALINE       L knee PROM  ALINE ALINE prone and seated ALINE supine and seated  ALINE seated ALINE seated flex, supine ext ALINE ALINE      Tib distraction         prone      LLE stretch  Prone quad Prone quad            L patella PROM  ALINE             LLE STM     Quad, hip flexor, HS  HS,  "adductors                       Neuro Re-Ed               Tandem walk        Fwd 24ft x2 laps with SPC  In // x4 laps f-b F-b 24ft x2 laps with SPC F-b 24ft x2 laps with SPC   Bwd walk        24ft x2 laps with SPC  In // x4 laps     Karaoke step            24ft x1 laps with SPC   Tandem balance    30\"x2 ea  30\"x2 ea         SLS               RB taps           F-b, s-s x30 ea    Dawn step over                                Ther Ex               Bike - cardio  ROM 5' ROM 6' ROM 7' ROM 7' ROM 7' ROM 7' 6' 6' 6' 6' 6'   Knee flex ROM Heel slide x10 Pre TB HSC 3'   TB HSC +MHP 4'     Knee flex on chair 5\"x15 Knee flex on chair 5\"x15 Knee flex on chair 5\"x15   Knee ext ROM HS stretch +OP 5\"x10       Prone hang MHP on HS, no ankle weight 5' Prone hang MHP on HS, 2# 6' Prone hang MHP on HS, 2# 5' Seated HS stretch 5\"x15 Seated HS stretch 5\"x15 Seated HS stretch 5\"x15   HS stretch    Supine w strap 30\"x3 Seated 10\"x15 Seated 10\"x10 Supine w strap 30\"x3        Calf stretch  Slant 10\"x10 Slant 10\"x10            LAQ x20 5# x30 5# x30            SLR x5              Leg press    95# pltfm 10 x30  95# pltfm 9 x30 95# pltfm 8 x30 105# pltfm 8 x30 105# pltfm 8 x30 125# pltfm 8 2x20 125# pltfm 8 2x20 125# pltfm 8 2x20   Knee ext machine    30# x30  30# x30 35# x30 45# x30 45# x30 45# 2x20 45# 2x20 45# 2x20   HSC machine    40# x30  40# x30 35# x30 45# x30 45# x30 45# 2x20 45# 2x20 45# 2x20   TKE           66# 5\"x20 66# 2x20   march   X10 ea X20 ea           Step up fwd   4in x10 ea 6in x10 ea  6in x20 ea  8in x20 ea       Step up lat      6in x10 ea  8in x20 ea 8in x20 ea 8in x20 ea     Step dn fwd, up bwd         4in x20   6in x20 6in x20     STS               Lat dawn step over          Med height x20                                                  Ther Activity                                             Gait Training                                             Modalities                                                     "

## 2025-02-27 ENCOUNTER — OFFICE VISIT (OUTPATIENT)
Dept: PHYSICAL THERAPY | Facility: CLINIC | Age: 65
End: 2025-02-27
Payer: COMMERCIAL

## 2025-02-27 DIAGNOSIS — Z96.652 S/P TOTAL KNEE REPLACEMENT, LEFT: Primary | ICD-10-CM

## 2025-02-27 PROCEDURE — 97112 NEUROMUSCULAR REEDUCATION: CPT

## 2025-02-27 PROCEDURE — 97110 THERAPEUTIC EXERCISES: CPT

## 2025-02-27 NOTE — PROGRESS NOTES
"Daily Note     Today's date: 2025  Patient name: Raul Smith  : 1960  MRN: [id number]  Referring provider: Bakari Larry DO  Dx:   Encounter Diagnosis     ICD-10-CM    1. S/P total knee replacement, left  Z96.652           Start Time: 933  Stop Time: 1016  Total time in clinic (min): 43 minutes    Subjective: Pt reports he is doing okay today - his pain is about 5/10. States his challenges are use of steps as he still does one step at a time as well as walking downhill. Next MD appt is coming up in April.       Objective: See treatment diary below      Assessment: Tolerated treatment well. Patient continues to be challenged appropriately. Resumed stepdowns and stepbacks as indicated below; stayed with 4\" step, pt is most challenged with stepping back with his LLE and it is a little painful. Otherwise, pt was able to complete all ex's as indicated below.  Patient demonstrated fatigue post treatment, exhibited good technique with therapeutic exercises, and would benefit from continued PT      Plan: Continue per plan of care.      Precautions: s/p L T[facility] 24, [facility]  Comorbidities: HTN, MI, Parkinson's       HEP:  Access Code: [email or web address]  URL: [email or web address]  Date: 2025  Prepared by: Jacqueline Ling    Exercises  - Knee flexion stretch on chair  - 3 sets - 10 reps - 5 sec hold  - Seated Hamstring Stretch  - 3 sets - 10 reps - 5 sec hold  - Tandem Walking with Counter Support  - 5 sets  - Single Leg Stance with Support  - 2 sets - 30 sec hold  - Step Up  - 20 reps      POC Expires Reeval for Medicare to be completed Unit LImit Auth Expiration Date PT/OT/STVisit Limit   25 By visit n/a N/a 25 15    Completed on visit                  TREATMENT DIARY    Auth status    approved               Visit # 6 7 8 9 10 11 12 13   Visits Remaining 9 8 7 6 5 4 3 2              Manuals           RE      ALINE     FOTO   ALINE        L knee PROM ALINE " "seated AILNE seated flex, supine ext ALINE ALINE       Tib distraction    prone       LLE stretch           L patella PROM           LLE STM  HS, adductors                    Neuro Re-Ed           Tandem walk   Fwd 24ft x2 laps with SPC  In // x4 laps f-b F-b 24ft x2 laps with SPC F-b 24ft x2 laps with SPC F-b 24ft x2 laps without SPC in // bars   Bwd walk   24ft x2 laps with SPC  In // x4 laps      Karaoke step       24ft x1 laps with SPC 24ft x2 laps without SPC in // bars   Tandem balance 30\"x2 ea          SLS           RB taps      F-b, s-s x30 ea     Charles step over                        Ther Ex           Bike - cardio ROM 7' ROM 7' 6' 6' 6' 6' 6' 6   Knee flex ROM     Knee flex on chair 5\"x15 Knee flex on chair 5\"x15 Knee flex on chair 5\"x15 Knee flex on chair 5\"x15    Knee ext ROM  Prone hang MHP on HS, no ankle weight 5' Prone hang MHP on HS, 2# 6' Prone hang MHP on HS, 2# 5' Seated HS stretch 5\"x15 Seated HS stretch 5\"x15 Seated HS stretch 5\"x15 Seated HS stretch 5\"x15   HS stretch Seated 10\"x10 Supine w strap 30\"x3         Calf stretch           LAQ           SLR           Leg press 95# pltfm 9 x30 95# pltfm 8 x30 105# pltfm 8 x30 105# pltfm 8 x30 125# pltfm 8 2x20 125# pltfm 8 2x20 125# pltfm 8 2x20 125# pltfm 8 2x20   Knee ext machine 30# x30 35# x30 45# x30 45# x30 45# 2x20 45# 2x20 45# 2x20 45# 2x20   HSC machine 40# x30 35# x30 45# x30 45# x30 45# 2x20 45# 2x20 45# 2x20 45# 2x20   TKE      66# 5\"x20 66# 2x20 66# 2x20   march           Step up fwd 6in x20 ea  8in x20 ea        Step up lat 6in x10 ea  8in x20 ea 8in x20 ea 8in x20 ea      Step dn fwd, up bwd    4in x20   6in x20 6in x20   4 in x10, x5 - challenged with step backwards with LLE   STS           Lat charles step over     Med height x20                                       Ther Activity                                 Gait Training                                 Modalities                                             "

## 2025-03-04 ENCOUNTER — OFFICE VISIT (OUTPATIENT)
Dept: PHYSICAL THERAPY | Facility: CLINIC | Age: 65
End: 2025-03-04
Payer: COMMERCIAL

## 2025-03-04 ENCOUNTER — OFFICE VISIT (OUTPATIENT)
Dept: VASCULAR SURGERY | Facility: CLINIC | Age: 65
End: 2025-03-04
Payer: COMMERCIAL

## 2025-03-04 VITALS
HEART RATE: 110 BPM | BODY MASS INDEX: 36.83 KG/M2 | WEIGHT: 271.9 LBS | SYSTOLIC BLOOD PRESSURE: 100 MMHG | DIASTOLIC BLOOD PRESSURE: 70 MMHG | HEIGHT: 72 IN | OXYGEN SATURATION: 95 %

## 2025-03-04 DIAGNOSIS — I71.43 INFRARENAL ABDOMINAL AORTIC ANEURYSM (AAA) WITHOUT RUPTURE (HCC): ICD-10-CM

## 2025-03-04 DIAGNOSIS — I72.4 POPLITEAL ARTERY ANEURYSM (HCC): Primary | ICD-10-CM

## 2025-03-04 DIAGNOSIS — I73.9 PERIPHERAL ARTERIAL DISEASE (HCC): ICD-10-CM

## 2025-03-04 DIAGNOSIS — Z96.652 S/P TOTAL KNEE REPLACEMENT, LEFT: Primary | ICD-10-CM

## 2025-03-04 PROCEDURE — 99213 OFFICE O/P EST LOW 20 MIN: CPT | Performed by: NURSE PRACTITIONER

## 2025-03-04 PROCEDURE — 97140 MANUAL THERAPY 1/> REGIONS: CPT | Performed by: PHYSICAL THERAPIST

## 2025-03-04 PROCEDURE — 97110 THERAPEUTIC EXERCISES: CPT | Performed by: PHYSICAL THERAPIST

## 2025-03-04 RX ORDER — NORTRIPTYLINE HYDROCHLORIDE 25 MG/1
50 CAPSULE ORAL
COMMUNITY
Start: 2025-01-30

## 2025-03-04 NOTE — PROGRESS NOTES
Name: Raul Smith      : 1960      MRN: [id number]  Encounter Provider: BUTCH Guzman  Encounter Date: 3/4/2025   Encounter department: [facility] [place]  :  Assessment & Plan  Popliteal artery aneurysm (HCC)  64-year-old male with HTN,  HLD, KEDAR, obesity, BMI 36, CAD, bilateral knee OA s/p L TKR, PAD, bilateral popliteal artery aneurysms, AAA     Patient presents to the office today to review AOIL 2025 and LEAD 2024      -LEAD 2024 showed R proximal popliteal >75% stenosis, diffuse SFA disease, R tibial artery measures 1.6 cm, right LETICIA 1 point 1/159/76l and left lower extremity diffuse femoral-popliteal disease without focal stenosis, left distal SFA 1.3 cm and left proximal popliteal 1.2 cm, left LETICIA 1.0 179/79  -Stable in comparison to prior duplex  -Symptoms of bilateral foot tingling when sitting and coolness of the extremities.  No claudication symptoms.  Recovering from left total knee replacement  -Palpable posterior tibial pulse bilaterally.  Unchanged exam from prior notes in the chart  -Will evaluate now with duplex to evaluate popliteal artery size for any changes  -Follow-up in the office after testing. Requested [facility] office     Orders:    VAS ARTERIAL DUPLEX- LOWER LIMB BILATERAL; Future    Infrarenal abdominal aortic aneurysm (AAA) without rupture (HCC)  -AOIL showed 3.1 cm infrarenal AAA  -Blood pressure control  -No heavy lifting  -Recommended for yearly duplex and yearly follow-up office visit    Orders:    VAS abdominal aorta/iliac duplex; Future    Peripheral arterial disease (HCC)  -Peripheral arterial occlusive disease  -LEAD 2024 showed R proximal popliteal >75% stenosis, diffuse SFA disease, R tibial artery measures 1.6 cm, right LETICIA 1 point 1/159/76l and left lower extremity diffuse femoral-popliteal disease without focal stenosis, left distal SFA 1.3 cm and left proximal popliteal 1.2 cm, left LETICIA 1.0 9/179/79  -No claudication  symptoms  -Continue aspirin and atorvastatin  -Follow-up after next testing    Orders:    VAS ARTERIAL DUPLEX- LOWER LIMB BILATERAL; Future      Chief Complaint   Patient presents with    Peripheral Vascular Disease     Pt is here for RR of Duplex 08/05/24. Pt states that BLE (FEET) gets numbness and cold extremities. Pt does not compress, but elevates sometimes.         History of Present Illness   Raul Smith is a 64 y.o. male who presents to the office to review arterial studies.  Last office visit 2023 with [facility].  He was found to have right popliteal artery aneurysm and workup for osteoarthritis.  He has been followed with duplex which shows stability.  Over the past year he does note tingling sensation of bilateral foot with sitting and relieved with laying flat for 20 minutes or so.  Reports feet feel cold all the time.  Reports symptoms over the last year.  [facility].  He denies any lower extremity claudication symptoms.  He has mild left lower extremity swelling.  History obtained from: patient    Review of Systems   Cardiovascular:  Positive for leg swelling.     Medical History Reviewed by provider this encounter:  Tobacco  Allergies  Meds  Problems  Med Hx  Surg Hx  Fam Hx     .     Objective   I have reviewed and made appropriate changes to the review of systems input by the medical assistant.    Vitals:    03/04/25 1328   BP: 100/70   BP Location: Left arm   Patient Position: Sitting   Cuff Size: Large   Pulse: (!) 110   SpO2: 95%   Weight: 123 kg (271 lb 14.4 oz)   Height: 6' (1.829 m)       Patient Active Problem List   Diagnosis    Essential hypertension    Morbid obesity (HCC)    S/P PTCA (percutaneous transluminal coronary angioplasty)    Coronary artery disease involving native coronary artery of native heart without angina pectoris    Primary osteoarthritis of both knees    Mixed hyperlipidemia    History of colonic  polyps    Patellofemoral arthritis    Obstructive sleep apnea syndrome    Chronic pain of both knees    Pes anserinus bursitis of both knees    Facial basal cell cancer    Depressive disorder    Polyp of colon    Rosacea    Popliteal artery aneurysm (HCC)    Abdominal aortic aneurysm (AAA) without rupture (HCC)    Peripheral arterial disease (HCC)       Past Surgical History:   Procedure Laterality Date    ANGIOPLASTY      ANKLE FUSION      CARDIAC SURGERY  2015    stent    CARPAL TUNNEL RELEASE Bilateral     COLONOSCOPY      JOINT REPLACEMENT Left 2024    L TKA    KNEE ARTHROSCOPY Bilateral     MENISCECTOMY Bilateral     PA COLONOSCOPY FLX DX W/COLLJ SPEC WHEN PFRMD N/A 2019    Procedure: COLONOSCOPY with polypectomies;  Surgeon: Garfield Meléndez DO;  Location: [facility];  Service: Gastroenterology    PA SURGICAL ARTHROSCOPY SHOULDER W/ROTATOR CUFF RPR Right 2020    Procedure: SHOULDER ARTHROSCOPIC ROTATOR CUFF REPAIR; SAD, BICEPS TENODESIS;  Surgeon: Kojo Harp MD;  Location: [facility];  Service: Orthopedics    ROTATOR CUFF REPAIR Left     TONSILLECTOMY         Family History   Problem Relation Age of Onset    Breast cancer Mother     COPD Father     Alcohol abuse Neg Hx     Substance Abuse Neg Hx     Mental illness Neg Hx        Social History     Socioeconomic History    Marital status: /Civil Union     Spouse name: Not on file    Number of children: Not on file    Years of education: Not on file    Highest education level: Not on file   Occupational History    Not on file   Tobacco Use    Smoking status: Former     Current packs/day: 0.00     Types: Cigarettes     Quit date: 2017     Years since quittin.8    Smokeless tobacco: Never   Vaping Use    Vaping status: Never Used   Substance and Sexual Activity    Alcohol use: Not Currently     Alcohol/week: 1.0 standard drink of alcohol     Types: 1 Cans of beer per week     Comment: Socially    Drug use: Never    Sexual  activity: Yes     Partners: Female   Other Topics Concern    Not on file   Social History Narrative    Not on file     Social Drivers of Health     Financial Resource Strain: Not on file   Food Insecurity: Not on file   Transportation Needs: Not on file   Physical Activity: Not on file   Stress: Not on file   Social Connections: Not on file   Intimate Partner Violence: Not on file   Housing Stability: Not on file       No Known Allergies      Current Outpatient Medications:     ARIPiprazole (ABILIFY) 2 mg tablet, TAKE 1 TABLET BY MOUTH EVERY DAY, Disp: 90 tablet, Rfl: 1    aspirin 81 mg CHEW, Chew 81 mg, Disp: , Rfl:     atorvastatin (LIPITOR) 40 mg tablet, TAKE 1 TABLET BY MOUTH EVERYDAY AT BEDTIME, Disp: 90 tablet, Rfl: 3    Multiple Vitamin (multivitamin) capsule, Take 1 capsule by mouth daily, Disp: , Rfl:     nortriptyline (PAMELOR) 25 mg capsule, Take 50 mg by mouth, Disp: , Rfl:     sertraline (ZOLOFT) 100 mg tablet, 100 mg, Disp: , Rfl:     traZODone (DESYREL) 150 mg tablet, Take 1 tablet (150 mg total) by mouth daily at bedtime, Disp: 90 tablet, Rfl: 1    Vitamin D, Cholecalciferol, 1000 units TABS, Take 1 tablet (1,000 Units total) by mouth daily, Disp: 90 tablet, Rfl: 1    carbidopa-levodopa (SINEMET)  mg per tablet, Take 1 tablet by mouth 3 (three) times a day (Patient not taking: Reported on 3/4/2025), Disp: , Rfl:     citalopram (CeleXA) 40 mg tablet, TAKE 1 TABLET BY MOUTH  DAILY (Patient not taking: Reported on 3/4/2025), Disp: 90 tablet, Rfl: 0    doxycycline (ORACEA) 40 MG capsule, Take 1 capsule (40 mg total) by mouth every morning (Patient not taking: Reported on 3/4/2025), Disp: 90 capsule, Rfl: 1    hydrOXYzine HCL (ATARAX) 25 mg tablet, Take 1 tablet (25 mg total) by mouth every 6 (six) hours (Patient not taking: Reported on 3/4/2025), Disp: 12 tablet, Rfl: 0    lisinopril (ZESTRIL) 5 mg tablet, TAKE 1 TABLET BY MOUTH  DAILY (Patient not taking: Reported on 3/4/2025), Disp: 30 tablet,  Rfl: 0    Omega-3 Fatty Acids (fish oil) 1,000 mg, Take 1,000 mg by mouth daily (Patient not taking: Reported on 3/4/2025), Disp: , Rfl:     SOOLANTRA 1 % CREA, , Disp: , Rfl:     /70 (BP Location: Left arm, Patient Position: Sitting, Cuff Size: Large)   Pulse (!) 110   Ht 6' (1.829 m)   Wt 123 kg (271 lb 14.4 oz)   SpO2 95%   BMI 36.88 kg/m²      Physical Exam  Vitals and nursing note reviewed.   Constitutional:       Appearance: He is well-developed.   HENT:      Head: Normocephalic and atraumatic.   Eyes:      Extraocular Movements: Extraocular movements intact.   Cardiovascular:      Rate and Rhythm: Tachycardia present.      Pulses:           Femoral pulses are 2+ on the right side and 2+ on the left side.       Dorsalis pedis pulses are 0 on the right side and 0 on the left side.        Posterior tibial pulses are 1+ on the right side and 2+ on the left side.      Heart sounds: Normal heart sounds.   Pulmonary:      Effort: Pulmonary effort is normal.   Abdominal:      Palpations: Abdomen is soft.      Tenderness: There is no abdominal tenderness.   Musculoskeletal:         General: Normal range of motion.      Cervical back: Neck supple.      Comments: 1+ left leg swelling after knee surgery   Skin:     General: Skin is warm.   Neurological:      General: No focal deficit present.      Mental Status: He is alert and oriented to person, place, and time.   Psychiatric:         Mood and Affect: Mood normal.         Behavior: Behavior normal.         Administrative Statements   I have spent a total time of 20 minutes in caring for this patient on the day of the visit/encounter including Diagnostic results, Prognosis, Risks and benefits of tx options, Instructions for management, Patient and family education, Importance of tx compliance, Risk factor reductions, Impressions, Counseling / Coordination of care, and Documenting in the medical record.

## 2025-03-04 NOTE — ASSESSMENT & PLAN NOTE
-Peripheral arterial occlusive disease  -LEAD 8/5/2024 showed R proximal popliteal >75% stenosis, diffuse SFA disease, R tibial artery measures 1.6 cm, right LETICIA 1 point 1/159/76l and left lower extremity diffuse femoral-popliteal disease without focal stenosis, left distal SFA 1.3 cm and left proximal popliteal 1.2 cm, left LETICIA 1.0 9/179/79  -No claudication symptoms  -Continue aspirin and atorvastatin  -Follow-up after next testing    Orders:    VAS ARTERIAL DUPLEX- LOWER LIMB BILATERAL; Future

## 2025-03-04 NOTE — ASSESSMENT & PLAN NOTE
-AOIL showed 3.1 cm infrarenal AAA  -Blood pressure control  -No heavy lifting  -Recommended for yearly duplex and yearly follow-up office visit    Orders:    VAS abdominal aorta/iliac duplex; Future

## 2025-03-04 NOTE — PROGRESS NOTES
Daily Note     Today's date: 3/4/2025  Patient name: Raul Smith  : 1960  MRN: [id number]  Referring provider: Bakari Larry DO  Dx:   Encounter Diagnosis     ICD-10-CM    1. S/P total knee replacement, left  Z96.652           Start Time: 915          Subjective: doing okay today      Objective: See treatment diary below      Assessment: Continues with antalgic gait with first few steps after being inactive. He is independent with mobility exercises and appropriately challenged with strengthening. He required assist from SPC and SBA from PT during tandem fwd and bwd walking and karaoke cross-stepping. Ended with CP. He will benefit from continued PT to maximize function.      Plan: Continue per plan of care.      Precautions: s/p L T[facility] 24, [facility]  Comorbidities: HTN, MI, Parkinson's       HEP:  Access Code: [email or web address]  URL: [email or web address]  Date: 2025  Prepared by: Jacqueline Ling    Exercises  - Knee flexion stretch on chair  - 3 sets - 10 reps - 5 sec hold  - Seated Hamstring Stretch  - 3 sets - 10 reps - 5 sec hold  - Tandem Walking with Counter Support  - 5 sets  - Single Leg Stance with Support  - 2 sets - 30 sec hold  - Step Up  - 20 reps      POC Expires Reeval for Medicare to be completed Unit LImit Auth Expiration Date PT/OT/STVisit Limit   25 By visit n/a N/a 25 15    Completed on visit                  TREATMENT DIARY    Auth status 1/28 1/30 2/4 2/7 2/18 2/20 2/25 2/27 3/4   approved               Visit # 6 7 8 9 10 11 12 13 14   Visits Remaining 9 8 7 6 5 4 3 2 1               Manuals            RE      ALINE      FOTO   ALINE         L knee PROM ALINE seated ALINE seated flex, supine ext ALINE ALINE     Prone ALINE   Tib distraction    prone        LLE stretch            L patella PROM            LLE STM  HS, adductors                      Neuro Re-Ed            Tandem walk   Fwd 24ft x2 laps with SPC  In // x4 laps f-b F-b 24ft x2 laps with SPC F-b 24ft x2 laps with SPC F-b  "24ft x2 laps without SPC in // bars F-b 24ft x2 laps with SPC   Bwd walk   24ft x2 laps with SPC  In // x4 laps       Karaoke step       24ft x1 laps with SPC 24ft x2 laps without SPC in // bars 24ft x1 laps with SPC   Tandem balance 30\"x2 ea           SLS            RB taps      F-b, s-s x30 ea      Dawn step over                          Ther Ex            Bike - cardio ROM 7' ROM 7' 6' 6' 6' 6' 6' 6 6'   Knee flex ROM     Knee flex on chair 5\"x15 Knee flex on chair 5\"x15 Knee flex on chair 5\"x15 Knee flex on chair 5\"x15  Knee flex on chair 5\"x15    Knee ext ROM  Prone hang MHP on HS, no ankle weight 5' Prone hang MHP on HS, 2# 6' Prone hang MHP on HS, 2# 5' Seated HS stretch 5\"x15 Seated HS stretch 5\"x15 Seated HS stretch 5\"x15 Seated HS stretch 5\"x15 Seated HS stretch 5\"x15   HS stretch Seated 10\"x10 Supine w strap 30\"x3          Calf stretch            LAQ            SLR            Leg press 95# pltfm 9 x30 95# pltfm 8 x30 105# pltfm 8 x30 105# pltfm 8 x30 125# pltfm 8 2x20 125# pltfm 8 2x20 125# pltfm 8 2x20 125# pltfm 8 2x20 125# 2x30   Knee ext machine 30# x30 35# x30 45# x30 45# x30 45# 2x20 45# 2x20 45# 2x20 45# 2x20 45# 2x30   HSC machine 40# x30 35# x30 45# x30 45# x30 45# 2x20 45# 2x20 45# 2x20 45# 2x20 45# 2x30   TKE      66# 5\"x20 66# 2x20 66# 2x20    march            Step up fwd 6in x20 ea  8in x20 ea         Step up lat 6in x10 ea  8in x20 ea 8in x20 ea 8in x20 ea       Step dn fwd, up bwd    4in x20   6in x20 6in x20   4 in x10, x5 - challenged with step backwards with LLE    STS            Lat dawn step over     Med height x20                                           Ther Activity                                    Gait Training                                    Modalities                                                "

## 2025-03-04 NOTE — ASSESSMENT & PLAN NOTE
64-year-old male with HTN,  HLD, KEDAR, obesity, BMI 36, CAD, bilateral knee OA s/p L TKR, PAD, bilateral popliteal artery aneurysms, AAA     Patient presents to the office today to review AOIL 5/17/2025 and LEAD 8/5/2024      -LEAD 8/5/2024 showed R proximal popliteal >75% stenosis, diffuse SFA disease, R tibial artery measures 1.6 cm, right LETICIA 1 point 1/159/76l and left lower extremity diffuse femoral-popliteal disease without focal stenosis, left distal SFA 1.3 cm and left proximal popliteal 1.2 cm, left LETICIA 1.0 9/179/79  -Stable in comparison to prior duplex  -Symptoms of bilateral foot tingling when sitting and coolness of the extremities.  No claudication symptoms.  Recovering from left total knee replacement  -Palpable posterior tibial pulse bilaterally.  Unchanged exam from prior notes in the chart  -Will evaluate now with duplex to evaluate popliteal artery size for any changes  -Follow-up in the office after testing. Requested [facility] office     Orders:    VAS ARTERIAL DUPLEX- LOWER LIMB BILATERAL; Future

## 2025-03-06 ENCOUNTER — OFFICE VISIT (OUTPATIENT)
Dept: PHYSICAL THERAPY | Facility: CLINIC | Age: 65
End: 2025-03-06
Payer: COMMERCIAL

## 2025-03-06 DIAGNOSIS — Z96.652 S/P TOTAL KNEE REPLACEMENT, LEFT: Primary | ICD-10-CM

## 2025-03-06 PROCEDURE — 97110 THERAPEUTIC EXERCISES: CPT | Performed by: PHYSICAL THERAPIST

## 2025-03-06 PROCEDURE — 97140 MANUAL THERAPY 1/> REGIONS: CPT | Performed by: PHYSICAL THERAPIST

## 2025-03-06 NOTE — PROGRESS NOTES
Daily Note - discharge     Today's date: 3/6/2025  Patient name: Raul Smith  : 1960  MRN: [id number]  Referring provider: Bakari Larry DO  Dx:   Encounter Diagnosis     ICD-10-CM    1. S/P total knee replacement, left  Z96.652                      Subjective: doing okay today, ready for d/c to independent HE[facility].      Objective: See treatment diary below      Assessment: Raul Smith has attended physical therapy for 15 visits. In this time, they have made significant improvements in symptoms and function, as noted by subjective report, improved balance, ROM, strength, flexibility and activity tolerance. They have made positive goal progress with FOTO score improvement from 37 at initial evaluation to 54 currently. Recommend d/c to [facility] at this time.        Plan: D/c to [facility]     Precautions: s/p L TKA 24, [facility]  Comorbidities: HTN, MI, Parkinson's       HEP:  Access Code: [email or web address]  URL: [email or web address]  Date: 2025  Prepared by: Jacqueline Ling    Exercises  - Knee flexion stretch on chair  - 3 sets - 10 reps - 5 sec hold  - Seated Hamstring Stretch  - 3 sets - 10 reps - 5 sec hold  - Tandem Walking with Counter Support  - 5 sets  - Single Leg Stance with Support  - 2 sets - 30 sec hold  - Step Up  - 20 reps      POC Expires Reeval for Medicare to be completed Unit LImit Auth Expiration Date PT/OT/STVisit Limit   25 By visit n/a N/a 25 15    Completed on visit                  TREATMENT DIARY    Auth status 1/28 1/30 2/4 2/7 2/18 2/20 2/25 2/27 3/4 3/6   approved               Visit # 6 7 8 9 10 11 12 13 14 15   Visits Remaining 9 8 7 6 5 4 3 2 1 0                Manuals             RE      ALINE       FOTO   ALINE          L knee PROM ALINE seated ALINE seated flex, supine ext ALINE ALINE     Prone ALINE ALINE   Tib distraction    prone         LLE stretch             L patella PROM             LLE STM  HS, adductors        ALINE                Neuro Re-Ed             Tandem walk   Fwd 24ft x2  "laps with SPC  In // x4 laps f-b F-b 24ft x2 laps with SPC F-b 24ft x2 laps with SPC F-b 24ft x2 laps without SPC in // bars F-b 24ft x2 laps with SPC    Bwd walk   24ft x2 laps with SPC  In // x4 laps        Karaoke step       24ft x1 laps with SPC 24ft x2 laps without SPC in // bars 24ft x1 laps with SPC    Tandem balance 30\"x2 ea            SLS             RB taps      F-b, s-s x30 ea       Dawn step over                            Ther Ex             Bike - cardio ROM 7' ROM 7' 6' 6' 6' 6' 6' 6 6' 6'   Knee flex ROM     Knee flex on chair 5\"x15 Knee flex on chair 5\"x15 Knee flex on chair 5\"x15 Knee flex on chair 5\"x15  Knee flex on chair 5\"x15     Knee ext ROM  Prone hang MHP on HS, no ankle weight 5' Prone hang MHP on HS, 2# 6' Prone hang MHP on HS, 2# 5' Seated HS stretch 5\"x15 Seated HS stretch 5\"x15 Seated HS stretch 5\"x15 Seated HS stretch 5\"x15 Seated HS stretch 5\"x15    HS stretch Seated 10\"x10 Supine w strap 30\"x3           Calf stretch             LAQ             SLR             Leg press 95# pltfm 9 x30 95# pltfm 8 x30 105# pltfm 8 x30 105# pltfm 8 x30 125# pltfm 8 2x20 125# pltfm 8 2x20 125# pltfm 8 2x20 125# pltfm 8 2x20 125# 2x30 125# 2x30   Knee ext machine 30# x30 35# x30 45# x30 45# x30 45# 2x20 45# 2x20 45# 2x20 45# 2x20 45# 2x30 45# 2x30   HSC machine 40# x30 35# x30 45# x30 45# x30 45# 2x20 45# 2x20 45# 2x20 45# 2x20 45# 2x30 45# 2x30   TKE      66# 5\"x20 66# 2x20 66# 2x20     march             Step up fwd 6in x20 ea  8in x20 ea          Step up lat 6in x10 ea  8in x20 ea 8in x20 ea 8in x20 ea        Step dn fwd, up bwd    4in x20   6in x20 6in x20   4 in x10, x5 - challenged with step backwards with LLE     STS             Lat dawn step over     Med height x20                                               Ther Activity                                       Gait Training                                       Modalities                                                   "

## 2025-03-21 ENCOUNTER — HOSPITAL ENCOUNTER (OUTPATIENT)
Dept: NON INVASIVE DIAGNOSTICS | Age: 65
Discharge: HOME | End: 2025-03-21
Payer: COMMERCIAL

## 2025-03-21 DIAGNOSIS — I73.9 PERIPHERAL ARTERIAL DISEASE (HCC): ICD-10-CM

## 2025-03-21 DIAGNOSIS — I72.4 POPLITEAL ARTERY ANEURYSM (HCC): ICD-10-CM

## 2025-03-21 PROCEDURE — 93923 UPR/LXTR ART STDY 3+ LVLS: CPT

## 2025-03-21 PROCEDURE — 93925 LOWER EXTREMITY STUDY: CPT

## 2025-03-22 PROCEDURE — 93922 UPR/L XTREMITY ART 2 LEVELS: CPT | Performed by: SURGERY

## 2025-03-22 PROCEDURE — 93925 LOWER EXTREMITY STUDY: CPT | Performed by: SURGERY

## 2025-03-25 ENCOUNTER — RESULTS FOLLOW-UP (OUTPATIENT)
Dept: VASCULAR SURGERY | Facility: CLINIC | Age: 65
End: 2025-03-25

## 2025-03-30 ENCOUNTER — HOSPITAL ENCOUNTER (OUTPATIENT)
Dept: CT IMAGING | Facility: HOSPITAL | Age: 65
Discharge: HOME | End: 2025-03-30

## 2025-03-30 DIAGNOSIS — Z72.0 TOBACCO USE: ICD-10-CM

## 2025-05-02 ENCOUNTER — OFFICE VISIT (OUTPATIENT)
Dept: URGENT CARE | Facility: CLINIC | Age: 65
End: 2025-05-02
Payer: COMMERCIAL

## 2025-05-02 VITALS
RESPIRATION RATE: 18 BRPM | SYSTOLIC BLOOD PRESSURE: 114 MMHG | HEART RATE: 110 BPM | TEMPERATURE: 98.2 F | OXYGEN SATURATION: 95 % | DIASTOLIC BLOOD PRESSURE: 68 MMHG

## 2025-05-02 DIAGNOSIS — T16.2XXA FOREIGN BODY OF LEFT EAR, INITIAL ENCOUNTER: Primary | ICD-10-CM

## 2025-05-02 PROCEDURE — 69200 CLEAR OUTER EAR CANAL: CPT

## 2025-05-02 PROCEDURE — 99203 OFFICE O/P NEW LOW 30 MIN: CPT

## 2025-05-02 RX ORDER — AMOXICILLIN 500 MG/1
500 TABLET, FILM COATED ORAL 2 TIMES DAILY
Qty: 14 TABLET | Refills: 0 | Status: SHIPPED | OUTPATIENT
Start: 2025-05-02 | End: 2025-05-09

## 2025-05-02 NOTE — PROGRESS NOTES
Name: Raul Smith      : 1960      MRN: [id number]  Encounter Provider: Alicia Resendez PA-C  Encounter Date: 2025   Encounter department: [facility]  :  Assessment & Plan  Foreign body of left ear, initial encounter    Orders:    amoxicillin (AMOXIL) 500 MG tablet; Take 1 tablet (500 mg total) by mouth 2 (two) times a day for 7 days    Small foreign body removed from left ear canal with use of flushing, curette, and small forceps.  Patient tolerated well but antibiotics will be sent given foreign body was in ear around 24 hours and redness and irritation found on the tympanic membrane to be safe.  Patient knows return precautions and tolerated procedure well.    Foreign body removal    Date/Time: 2025 10:00 AM    Performed by: Alicia Resendez PA-C  Authorized by: Alicia Resendez PA-C  Body area: ear  Location details: left ear  Localization method: visualized  Removal mechanism: curette, alligator forceps and irrigation  1 objects recovered.  Post-procedure assessment: foreign body removed  Patient tolerance: patient tolerated the procedure well with no immediate complications        History of Present Illness   HPI  Raul Smith is a 65 y.o. male who presents because he thinks he has something stuck in his ear since last night. Patient thinks the rubber piece of his hearing aid may have gotten logged in his left ear. Patient is having trouble hearing and has some discomfo           Review of Systems   HENT:  Positive for ear pain and hearing loss.           Objective   /68   Pulse (!) 110   Temp 98.2 °F (36.8 °C)   Resp 18   SpO2 95%      Physical Exam  Constitutional:       Appearance: Normal appearance.   HENT:      Head: Normocephalic and atraumatic.      Right Ear: Tympanic membrane and ear canal normal.      Ears:      Comments: Small round gray rubber piece of hearing aid flush against tympanic membrane.  Ear canal completely open after  instrumentation and removal of small foreign object.  Pulmonary:      Effort: Pulmonary effort is normal.   Neurological:      Mental Status: He is alert.

## 2025-05-23 ENCOUNTER — HOSPITAL ENCOUNTER (OUTPATIENT)
Dept: NON INVASIVE DIAGNOSTICS | Facility: HOSPITAL | Age: 65
Discharge: HOME | End: 2025-05-23
Attending: NURSE PRACTITIONER
Payer: COMMERCIAL

## 2025-05-23 DIAGNOSIS — I71.43 INFRARENAL ABDOMINAL AORTIC ANEURYSM (AAA) WITHOUT RUPTURE (HCC): ICD-10-CM

## 2025-05-23 PROCEDURE — 93978 VASCULAR STUDY: CPT

## 2025-06-02 ENCOUNTER — OFFICE VISIT (OUTPATIENT)
Dept: VASCULAR SURGERY | Facility: CLINIC | Age: 65
End: 2025-06-02
Payer: COMMERCIAL

## 2025-06-02 VITALS
HEIGHT: 72 IN | DIASTOLIC BLOOD PRESSURE: 93 MMHG | BODY MASS INDEX: 36.84 KG/M2 | OXYGEN SATURATION: 100 % | WEIGHT: 272 LBS | HEART RATE: 101 BPM | SYSTOLIC BLOOD PRESSURE: 127 MMHG

## 2025-06-02 DIAGNOSIS — I72.4 POPLITEAL ARTERY ANEURYSM (HCC): Primary | ICD-10-CM

## 2025-06-02 DIAGNOSIS — I73.9 PERIPHERAL ARTERIAL DISEASE (HCC): ICD-10-CM

## 2025-06-02 DIAGNOSIS — I71.40 ABDOMINAL AORTIC ANEURYSM (AAA) WITHOUT RUPTURE, UNSPECIFIED PART (HCC): ICD-10-CM

## 2025-06-02 PROCEDURE — 99213 OFFICE O/P EST LOW 20 MIN: CPT | Performed by: NURSE PRACTITIONER

## 2025-06-02 NOTE — ASSESSMENT & PLAN NOTE
-LEAD showed right proximal popliteal artery measures 1.63 cm, right popliteal greater than 75% stenosis, right LETICIA 1.0/168/80 and left SFA/proximal popliteal ectasia, left LETICIA 1.14/170/84  -Complains of bilateral calf cramping, R>L with ambulating half a block  -Palpable posterior tibial pulse bilaterally, normal LETICIA  -No vascular intervention recommended  -Recommended daily walking routine  -Recommended yearly lower extremity arterial duplex  -Continue aspirin and atorvastatin  -Follow-up in the office in 1 year    Orders:    VAS ARTERIAL DUPLEX- LOWER LIMB BILATERAL; Future

## 2025-06-02 NOTE — ASSESSMENT & PLAN NOTE
-AOIL 5/23/2025 showed stable 3.1 cm infrarenal fusiform AAA  -Blood pressure controlled  -Avoid heavy lifting  -Recommended yearly duplex  -Follow-up in the office in 1 year    Orders:    VAS abdominal aorta/iliac duplex; Future    VAS ARTERIAL DUPLEX- LOWER LIMB BILATERAL; Future

## 2025-06-02 NOTE — PROGRESS NOTES
Name: Raul Smith      : 1960      MRN: [id number]  Encounter Provider: BUTCH Guzman  Encounter Date: 2025   Encounter department: [facility]  :  Assessment & Plan  Popliteal artery aneurysm (HCC)  65-year-old male with HTN,  HLD, KEDAR, obesity, BMI 36, CAD, bilateral knee OA s/p L TKR Dec '24, PAD, bilateral popliteal artery aneurysms, AAA      Patient returns to the office to review LEAD 3/21/2025 and AOIL 2025    -LEAD showed right proximal popliteal artery measures 1.63 cm, right popliteal greater than 75% stenosis, right LETICIA 1.0/168/80 and left SFA/proximal popliteal ectasia, left LETICIA 1.14/170/84  -Stable in comparison to prior duplex  -Palpable posterior tibial pulse bilaterally  -Recommended yearly lower extremity arterial duplex  -Follow-up in the office in 1 year        Orders:    VAS ARTERIAL DUPLEX- LOWER LIMB BILATERAL; Future    Abdominal aortic aneurysm (AAA) without rupture, unspecified part (HCC)  -AOIL 2025 showed stable 3.1 cm infrarenal fusiform AAA  -Blood pressure controlled  -Avoid heavy lifting  -Recommended yearly duplex  -Follow-up in the office in 1 year    Orders:    VAS abdominal aorta/iliac duplex; Future    VAS ARTERIAL DUPLEX- LOWER LIMB BILATERAL; Future    Peripheral arterial disease (HCC)  -LEAD showed right proximal popliteal artery measures 1.63 cm, right popliteal greater than 75% stenosis, right LETICIA 1.0/168/80 and left SFA/proximal popliteal ectasia, left LETICIA 1.14/170/84  -Complains of bilateral calf cramping, R>L with ambulating half a block  -Palpable posterior tibial pulse bilaterally, normal LETICIA  -No vascular intervention recommended  -Recommended daily walking routine  -Recommended yearly lower extremity arterial duplex  -Continue aspirin and atorvastatin  -Follow-up in the office in 1 year    Orders:    VAS ARTERIAL DUPLEX- LOWER LIMB BILATERAL; Future      Chief Complaint   Patient presents with    results  review         History of Present Illness   Raul Smith is a 65 y.o. male who presents to the office to review aortoiliac and lower extremity arterial duplex.  He reports he is 50% recovered from left knee surgery.  He has bilateral calf cramping with ambulation of approximately half a block.  Lower extremity arterial duplex showed stable disease with preserved LETICIA.  She is on aspirin and atorvastatin.  History obtained from: patient    Review of Systems   Cardiovascular:  Negative for leg swelling.   Skin:  Negative for wound.     Medical History Reviewed by provider this encounter:  Tobacco  Allergies  Meds  Problems  Med Hx  Surg Hx  Fam Hx     .     Objective   I have reviewed and made appropriate changes to the review of systems input by the medical assistant.    Vitals:    25 1455 25 1457   BP: 121/94 127/93   BP Location: Right arm Left arm   Patient Position: Sitting Sitting   Pulse: 101    SpO2: 100%    Weight: 123 kg (272 lb)    Height: 6' (1.829 m)        Problem List[1]    Past Surgical History[2]    Family History[3]    Social History     Socioeconomic History    Marital status: /Civil Union     Spouse name: Not on file    Number of children: Not on file    Years of education: Not on file    Highest education level: Not on file   Occupational History    Not on file   Tobacco Use    Smoking status: Former     Current packs/day: 0.00     Types: Cigarettes     Quit date: 2017     Years since quittin.0    Smokeless tobacco: Never   Vaping Use    Vaping status: Never Used   Substance and Sexual Activity    Alcohol use: Not Currently     Alcohol/week: 1.0 standard drink of alcohol     Types: 1 Cans of beer per week     Comment: Socially    Drug use: Never    Sexual activity: Yes     Partners: Female   Other Topics Concern    Not on file   Social History Narrative    Not on file     Social Drivers of Health     Financial Resource Strain: Not on file   Food Insecurity: Not  on file   Transportation Needs: Not on file   Physical Activity: Not on file   Stress: Not on file   Social Connections: Not on file   Intimate Partner Violence: Not on file   Housing Stability: Not on file       Allergies[4]    Current Medications[5]    /93 (BP Location: Left arm, Patient Position: Sitting)   Pulse 101   Ht 6' (1.829 m)   Wt 123 kg (272 lb)   SpO2 100%   BMI 36.89 kg/m²      Physical Exam  Vitals and nursing note reviewed.   Constitutional:       Appearance: Normal appearance.   HENT:      Head: Normocephalic and atraumatic.     Eyes:      Extraocular Movements: Extraocular movements intact.       Cardiovascular:      Pulses:           Posterior tibial pulses are 2+ on the right side and 2+ on the left side.      Heart sounds: Normal heart sounds.   Pulmonary:      Effort: Pulmonary effort is normal.     Musculoskeletal:         General: Normal range of motion.      Right lower leg: No edema.      Left lower leg: No edema.     Neurological:      General: No focal deficit present.      Mental Status: He is alert and oriented to person, place, and time.     Psychiatric:         Mood and Affect: Mood normal.         Behavior: Behavior normal.              [1]   Patient Active Problem List  Diagnosis    Essential hypertension    Morbid obesity (HCC)    S/P PTCA (percutaneous transluminal coronary angioplasty)    Coronary artery disease involving native coronary artery of native heart without angina pectoris    Primary osteoarthritis of both knees    Mixed hyperlipidemia    History of colonic polyps    Patellofemoral arthritis    Obstructive sleep apnea syndrome    Chronic pain of both knees    Pes anserinus bursitis of both knees    Facial basal cell cancer    Depressive disorder    Polyp of colon    Rosacea    Popliteal artery aneurysm (HCC)    Abdominal aortic aneurysm (AAA) without rupture (HCC)    Peripheral arterial disease (HCC)   [2]   Past Surgical History:  Procedure Laterality Date     ANGIOPLASTY      ANKLE FUSION      CARDIAC SURGERY  2015    stent    CARPAL TUNNEL RELEASE Bilateral     COLONOSCOPY      JOINT REPLACEMENT Left 12/16/2024    L TKA    KNEE ARTHROSCOPY Bilateral     MENISCECTOMY Bilateral     ID COLONOSCOPY FLX DX W/COLLJ SPEC WHEN PFRMD N/A 04/24/2019    Procedure: COLONOSCOPY with polypectomies;  Surgeon: Garfield Meléndez DO;  Location:  MAIN OR;  Service: Gastroenterology    ID SURGICAL ARTHROSCOPY SHOULDER W/ROTATOR CUFF RPR Right 02/07/2020    Procedure: SHOULDER ARTHROSCOPIC ROTATOR CUFF REPAIR; SAD, BICEPS TENODESIS;  Surgeon: Kojo Harp MD;  Location: [facility];  Service: Orthopedics    ROTATOR CUFF REPAIR Left     TONSILLECTOMY     [3]   Family History  Problem Relation Name Age of Onset    Breast cancer Mother      COPD Father      Alcohol abuse Neg Hx      Substance Abuse Neg Hx      Mental illness Neg Hx     [4] No Known Allergies  [5]   Current Outpatient Medications:     ARIPiprazole (ABILIFY) 2 mg tablet, TAKE 1 TABLET BY MOUTH EVERY DAY, Disp: 90 tablet, Rfl: 1    aspirin 81 mg CHEW, Chew 81 mg, Disp: , Rfl:     atorvastatin (LIPITOR) 40 mg tablet, TAKE 1 TABLET BY MOUTH EVERYDAY AT BEDTIME, Disp: 90 tablet, Rfl: 3    Multiple Vitamin (multivitamin) capsule, Take 1 capsule by mouth in the morning., Disp: , Rfl:     nortriptyline (PAMELOR) 25 mg capsule, Take 50 mg by mouth, Disp: , Rfl:     sertraline (ZOLOFT) 100 mg tablet, 100 mg, Disp: , Rfl:     traZODone (DESYREL) 150 mg tablet, Take 1 tablet (150 mg total) by mouth daily at bedtime, Disp: 90 tablet, Rfl: 1    Vitamin D, Cholecalciferol, 1000 units TABS, Take 1 tablet (1,000 Units total) by mouth daily, Disp: 90 tablet, Rfl: 1    carbidopa-levodopa (SINEMET)  mg per tablet, Take 1 tablet by mouth 3 (three) times a day (Patient not taking: Reported on 3/4/2025), Disp: , Rfl:     citalopram (CeleXA) 40 mg tablet, TAKE 1 TABLET BY MOUTH  DAILY (Patient not taking: Reported on 3/4/2025), Disp: 90  tablet, Rfl: 0    doxycycline (ORACEA) 40 MG capsule, Take 1 capsule (40 mg total) by mouth every morning (Patient not taking: Reported on 3/4/2025), Disp: 90 capsule, Rfl: 1    hydrOXYzine HCL (ATARAX) 25 mg tablet, Take 1 tablet (25 mg total) by mouth every 6 (six) hours (Patient not taking: Reported on 3/4/2025), Disp: 12 tablet, Rfl: 0    lisinopril (ZESTRIL) 5 mg tablet, TAKE 1 TABLET BY MOUTH  DAILY (Patient not taking: Reported on 3/4/2025), Disp: 30 tablet, Rfl: 0    Omega-3 Fatty Acids (fish oil) 1,000 mg, Take 1,000 mg by mouth daily (Patient not taking: Reported on 3/4/2025), Disp: , Rfl:     SOOLANTRA 1 % CREA, , Disp: , Rfl:

## 2025-06-02 NOTE — ASSESSMENT & PLAN NOTE
65-year-old male with HTN,  HLD, KEDAR, obesity, BMI 36, CAD, bilateral knee OA s/p L TKR Dec '24, PAD, bilateral popliteal artery aneurysms, AAA      Patient returns to the office to review LEAD 3/21/2025 and AOIL 5/23/2025    -LEAD showed right proximal popliteal artery measures 1.63 cm, right popliteal greater than 75% stenosis, right LETICIA 1.0/168/80 and left SFA/proximal popliteal ectasia, left LETICIA 1.14/170/84  -Stable in comparison to prior duplex  -Palpable posterior tibial pulse bilaterally  -Recommended yearly lower extremity arterial duplex  -Follow-up in the office in 1 year        Orders:    VAS ARTERIAL DUPLEX- LOWER LIMB BILATERAL; Future

## 2025-06-05 ENCOUNTER — HOSPITAL ENCOUNTER (EMERGENCY)
Facility: HOSPITAL | Age: 65
Discharge: HOME | End: 2025-06-06
Attending: EMERGENCY MEDICINE
Payer: COMMERCIAL

## 2025-06-05 ENCOUNTER — APPOINTMENT (EMERGENCY)
Dept: CT IMAGING | Facility: HOSPITAL | Age: 65
End: 2025-06-05
Payer: COMMERCIAL

## 2025-06-05 DIAGNOSIS — I71.40 ABDOMINAL AORTIC ANEURYSM (AAA) WITHOUT RUPTURE, UNSPECIFIED PART (HCC): ICD-10-CM

## 2025-06-05 DIAGNOSIS — I25.10 CORONARY ARTERY DISEASE INVOLVING NATIVE CORONARY ARTERY OF NATIVE HEART WITHOUT ANGINA PECTORIS: ICD-10-CM

## 2025-06-05 DIAGNOSIS — R07.9 CHEST PAIN, UNSPECIFIED TYPE: Primary | ICD-10-CM

## 2025-06-05 LAB
ALBUMIN SERPL BCG-MCNC: 4.2 G/DL (ref 3.5–5)
ALP SERPL-CCNC: 72 U/L (ref 34–104)
ALT SERPL W P-5'-P-CCNC: 14 U/L (ref 7–52)
ANION GAP SERPL CALCULATED.3IONS-SCNC: 8 MMOL/L (ref 4–13)
AST SERPL W P-5'-P-CCNC: 30 U/L (ref 13–39)
BASOPHILS # BLD AUTO: 0.05 THOUSANDS/ÂΜL (ref 0–0.1)
BASOPHILS NFR BLD AUTO: 1 % (ref 0–1)
BILIRUB SERPL-MCNC: 0.82 MG/DL (ref 0.2–1)
BUN SERPL-MCNC: 17 MG/DL (ref 5–25)
CALCIUM SERPL-MCNC: 9.7 MG/DL (ref 8.4–10.2)
CHLORIDE SERPL-SCNC: 104 MMOL/L (ref 96–108)
CO2 SERPL-SCNC: 25 MMOL/L (ref 21–32)
CREAT SERPL-MCNC: 0.88 MG/DL (ref 0.6–1.3)
EOSINOPHIL # BLD AUTO: 0.2 THOUSAND/ÂΜL (ref 0–0.61)
EOSINOPHIL NFR BLD AUTO: 2 % (ref 0–6)
ERYTHROCYTE [DISTWIDTH] IN BLOOD BY AUTOMATED COUNT: 13.3 % (ref 11.6–15.1)
GFR SERPL CREATININE-BSD FRML MDRD: 88 ML/MIN/1.73SQ M
GLUCOSE SERPL-MCNC: 132 MG/DL (ref 65–140)
HCT VFR BLD AUTO: 48 % (ref 36.5–49.3)
HGB BLD-MCNC: 16.2 G/DL (ref 12–17)
IMM GRANULOCYTES # BLD AUTO: 0.09 THOUSAND/UL (ref 0–0.2)
IMM GRANULOCYTES NFR BLD AUTO: 1 % (ref 0–2)
LYMPHOCYTES # BLD AUTO: 2.63 THOUSANDS/ÂΜL (ref 0.6–4.47)
LYMPHOCYTES NFR BLD AUTO: 31 % (ref 14–44)
MCH RBC QN AUTO: 29 PG (ref 26.8–34.3)
MCHC RBC AUTO-ENTMCNC: 33.8 G/DL (ref 31.4–37.4)
MCV RBC AUTO: 86 FL (ref 82–98)
MONOCYTES # BLD AUTO: 0.51 THOUSAND/ÂΜL (ref 0.17–1.22)
MONOCYTES NFR BLD AUTO: 6 % (ref 4–12)
NEUTROPHILS # BLD AUTO: 5 THOUSANDS/ÂΜL (ref 1.85–7.62)
NEUTS SEG NFR BLD AUTO: 59 % (ref 43–75)
NRBC BLD AUTO-RTO: 0 /100 WBCS
PLATELET # BLD AUTO: 249 THOUSANDS/UL (ref 149–390)
PMV BLD AUTO: 8.9 FL (ref 8.9–12.7)
POTASSIUM SERPL-SCNC: 4 MMOL/L (ref 3.5–5.3)
PROT SERPL-MCNC: 7 G/DL (ref 6.4–8.4)
RBC # BLD AUTO: 5.58 MILLION/UL (ref 3.88–5.62)
SODIUM SERPL-SCNC: 137 MMOL/L (ref 135–147)
WBC # BLD AUTO: 8.48 THOUSAND/UL (ref 4.31–10.16)

## 2025-06-05 PROCEDURE — 85025 COMPLETE CBC W/AUTO DIFF WBC: CPT | Performed by: EMERGENCY MEDICINE

## 2025-06-05 PROCEDURE — 36415 COLL VENOUS BLD VENIPUNCTURE: CPT | Performed by: EMERGENCY MEDICINE

## 2025-06-05 PROCEDURE — 71275 CT ANGIOGRAPHY CHEST: CPT

## 2025-06-05 PROCEDURE — 74174 CTA ABD&PLVS W/CONTRAST: CPT

## 2025-06-05 PROCEDURE — 84484 ASSAY OF TROPONIN QUANT: CPT | Performed by: EMERGENCY MEDICINE

## 2025-06-05 PROCEDURE — 80053 COMPREHEN METABOLIC PANEL: CPT | Performed by: EMERGENCY MEDICINE

## 2025-06-05 PROCEDURE — 99285 EMERGENCY DEPT VISIT HI MDM: CPT

## 2025-06-05 PROCEDURE — 99285 EMERGENCY DEPT VISIT HI MDM: CPT | Performed by: EMERGENCY MEDICINE

## 2025-06-06 VITALS
BODY MASS INDEX: 36.62 KG/M2 | RESPIRATION RATE: 18 BRPM | HEART RATE: 96 BPM | TEMPERATURE: 98.5 F | WEIGHT: 270 LBS | DIASTOLIC BLOOD PRESSURE: 85 MMHG | OXYGEN SATURATION: 90 % | SYSTOLIC BLOOD PRESSURE: 125 MMHG

## 2025-06-06 LAB
2HR DELTA HS TROPONIN: 0 NG/L
CARDIAC TROPONIN I PNL SERPL HS: 4 NG/L (ref ?–50)
CARDIAC TROPONIN I PNL SERPL HS: 4 NG/L (ref ?–50)

## 2025-06-06 PROCEDURE — 36415 COLL VENOUS BLD VENIPUNCTURE: CPT | Performed by: EMERGENCY MEDICINE

## 2025-06-06 PROCEDURE — 84484 ASSAY OF TROPONIN QUANT: CPT | Performed by: EMERGENCY MEDICINE

## 2025-06-06 RX ADMIN — IOHEXOL 100 ML: 350 INJECTION, SOLUTION INTRAVENOUS at 00:20

## 2025-06-06 NOTE — ED PROVIDER NOTES
Time reflects when diagnosis was documented in both MDM as applicable and the Disposition within this note       Time User Action Codes Description Comment    6/6/2025  2:13 AM Ulysses Ching [R07.9] Chest pain, unspecified type     6/6/2025  2:13 AM Ulysses Ching Add [I25.10] Coronary artery disease involving native coronary artery of native heart without angina pectoris     6/6/2025  2:13 AM Ulysses Ching Add [I71.40] Abdominal aortic aneurysm (AAA) without rupture, unspecified part (HCC)           ED Disposition       ED Disposition   Discharge    Condition   Stable    Date/Time   Fri Jun 6, 2025  2:13 AM    Comment   Raul Smith discharge to home/self care.                   Assessment & Plan       Medical Decision Making    65 y.o. male presenting for chest pain.  Pain improving at time of evaluation.  Will obtain labs to evaluate for anemia, electrolyte abnormality or JOHAN.  Will obtain EKG and troponin to evaluate for arrhythmia or ACS.  Given history of AAA will pursue CTA to exclude aortic dissection or worsening AAA.  No epigastric or right upper quadrant tenderness.  Do not suspect pancreatitis or biliary disease.  Differential would include gastritis/esophagitis.    Reassessment: VSS, asymptomatic and resting comfortably.  Reviewed labs and CT results in detail.  AAA known to patient and CT measurement similar to prior outpatient US.  No evidence of ACS.  Unclear etiology of symptoms, possibly gastritis.    Disposition: I have discussed with the patient our plan to discharge them from the ED and the patient is in agreement with this plan.     Discharge Plan: Advised to continue home medication regimen and contact outpatient cardiologist for evaluation. RTED precautions emphasized. The patient was provided a written after visit summary with strict RTED precautions.     Followup: I have discussed with the patient plan to [facility]. Contact information provided  in AVS.    Amount and/or Complexity of Data Reviewed  Labs: ordered.  Radiology: ordered.    Risk  Prescription drug management.        ED Course as of 06/06/25 0306   Thu Jun 05, 2025   2333 Procedure Note: EKG  Date/Time: 06/05/25 11:33 PM   Interpreted by: Ulysses Ching DO  Indications / Diagnosis: Chest pain  ECG reviewed by me, the ED Provider: yes   The EKG demonstrates:  Rhythm: sinus tachycardia 104 BPM  Intervals: Normal NJ and QT intervals  Axis: Normal axis  QRS/Blocks: Normal QRS  ST Changes: No acute ST/T waves changes. No CARLOS. No TWI.   Fri Jun 06, 2025   0212 Repeat evaluation. Patient asymptomatic. Vitals stable.  Reviewed labs and CT in detail. Will advise outpatient followup.       Medications   iohexol (OMNIPAQUE) 350 MG/ML injection (MULTI-DOSE) 100 mL (100 mL Intravenous Given 6/6/25 0020)       ED Risk Strat Scores   HEART Risk Score      Flowsheet Row Most Recent Value   Heart Score Risk Calculator    History 1 Filed at: 06/06/2025 0009   ECG 1 Filed at: 06/06/2025 0009   Age 2 Filed at: 06/06/2025 0009   Risk Factors 2 Filed at: 06/06/2025 0009   Troponin 0 Filed at: 06/06/2025 0009   HEART Score 6 Filed at: 06/06/2025 0009          HEART Risk Score      Flowsheet Row Most Recent Value   Heart Score Risk Calculator    History 1 Filed at: 06/06/2025 0009   ECG 1 Filed at: 06/06/2025 0009   Age 2 Filed at: 06/06/2025 0009   Risk Factors 2 Filed at: 06/06/2025 0009   Troponin 0 Filed at: 06/06/2025 0009   HEART Score 6 Filed at: 06/06/2025 0009                      No data recorded        SBIRT 22yo+      Flowsheet Row Most Recent Value   Initial Alcohol Screen: US AUDIT-C     1. How often do you have a drink containing alcohol? 0 Filed at: 06/05/2025 2334   2. How many drinks containing alcohol do you have on a typical day you are drinking?  0 Filed at: 06/05/2025 2334   3a. Male UNDER 65: How often do you have five or more drinks on one occasion? 0 Filed at: 06/05/2025 2334   3b. FEMALE  Any Age, or MALE 65+: How often do you have 4 or more drinks on one occassion? 0 Filed at: 06/05/2025 2333   Audit-C Score 0 Filed at: 06/05/2025 2336   GARRICK: How many times in the past year have you...    Used an illegal drug or used a prescription medication for non-medical reasons? Never Filed at: 06/05/2025 2522                            History of Present Illness       Chief Complaint   Patient presents with    Chest Pain     Pt had heavy chest pressure around 10pm and took a nitro. Ems gave pt 324 asp on the way here. Pt pain level is now a 2/10. Pt states the feeling was familiar to his MI in 2015.       Past Medical History[1]   Past Surgical History[2]   Family History[3]   Social History[4]   E-Cigarette/Vaping    E-Cigarette Use Never User       E-Cigarette/Vaping Substances      I have reviewed and agree with the history as documented.     Raul Smith is a 65 y.o. year old male with PMH of CAD s/p PCI, AAA presenting to the [facility] ED for chest pain pain. Patient has had one hour of substernal nonradiating chest pain. Started while at rest and described as a burning sensation. No nausea/vomiting or abdominal pain. No shortness of breath. No leg pain/swelling. Reportedly in normal state of health earlier today. Per review of records he has history of CAD s/p PCI and AAA for which he follows with vascular surgery. Patient denies associated back pain or weakness/numbness in extremities. The patient has taken a SL nitro at home which provided symptomatic improvement. Pain rated 2/10 at time of evaluation Patient has also received 324mg ASA from EMS PTA.      History provided by:  Medical records, patient and EMS personnel   used: No    Chest Pain  Associated symptoms: no abdominal pain, no back pain, no cough, no diaphoresis, no fever, no nausea, no shortness of breath and not vomiting        Review of Systems   Constitutional:  Negative for chills, diaphoresis and fever.    Respiratory:  Negative for cough and shortness of breath.    Cardiovascular:  Positive for chest pain. Negative for leg swelling.   Gastrointestinal:  Negative for abdominal pain, diarrhea, nausea and vomiting.   Genitourinary:  Negative for flank pain.   Musculoskeletal:  Negative for back pain.   All other systems reviewed and are negative.          Objective       ED Triage Vitals [06/05/25 2332]   Temperature Pulse Blood Pressure Respirations SpO2 Patient Position - Orthostatic VS   98.5 °F (36.9 °C) (!) 106 105/62 20 94 % --      Temp Source Heart Rate Source BP Location FiO2 (%) Pain Score    Oral Monitor -- -- 2      Vitals      Date and Time Temp Pulse SpO2 Resp BP Pain Score FACES Pain Rating User   06/06/25 0200 -- 96 90 % 18 125/85 -- --    06/06/25 0130 -- 97 93 % -- 118/71 -- --    06/06/25 0115 -- 93 92 % 18 103/69 -- --    06/06/25 0045 -- 96 93 % 18 108/71 -- --    06/06/25 0015 -- 96 96 % 18 103/66 -- --    06/05/25 2345 -- 102 93 % 19 98/58 -- --    06/05/25 2332 98.5 °F (36.9 °C) 106 94 % -- 105/62 2 --    06/05/25 2332 -- -- -- 20 -- -- -- SV            Physical Exam  Vitals and nursing note reviewed.   Constitutional:       General: He is not in acute distress.     Appearance: He is well-developed. He is not toxic-appearing or diaphoretic.   HENT:      Head: Normocephalic and atraumatic.      Nose: No congestion or rhinorrhea.     Eyes:      General:         Right eye: No discharge.         Left eye: No discharge.       Cardiovascular:      Rate and Rhythm: Regular rhythm. Tachycardia present.   Pulmonary:      Effort: Pulmonary effort is normal. No respiratory distress.      Breath sounds: Normal breath sounds. No wheezing or rales.   Abdominal:      General: Bowel sounds are normal. There is no distension.      Palpations: Abdomen is soft.      Tenderness: There is no abdominal tenderness. There is no guarding or rebound.     Musculoskeletal:      Cervical back: Normal range of  motion. No rigidity.      Right lower leg: No tenderness. No edema.      Left lower leg: No tenderness. No edema.     Skin:     General: Skin is warm.      Capillary Refill: Capillary refill takes less than 2 seconds.     Neurological:      Mental Status: He is alert and oriented to person, place, and time.     Psychiatric:         Mood and Affect: Mood normal.         Behavior: Behavior normal.         Results Reviewed       Procedure Component Value Units Date/Time    HS Troponin I 2hr [[id number]]  (Normal) Collected: 06/06/25 0100    Lab Status: Final result Specimen: Blood from Arm, Left Updated: 06/06/25 0126     hs TnI 2hr 4 ng/L      Delta 2hr hsTnI 0 ng/L     HS Troponin 0hr (reflex protocol) [[id number]]  (Normal) Collected: 06/05/25 2337    Lab Status: Final result Specimen: Blood from Hand, Left Updated: 06/06/25 0005     hs TnI 0hr 4 ng/L     Comprehensive metabolic panel [[id number]] Collected: 06/05/25 2337    Lab Status: Final result Specimen: Blood from Hand, Left Updated: 06/05/25 2357     Sodium 137 mmol/L      Potassium 4.0 mmol/L      Chloride 104 mmol/L      CO2 25 mmol/L      ANION GAP 8 mmol/L      BUN 17 mg/dL      Creatinine 0.88 mg/dL      Glucose 132 mg/dL      Calcium 9.7 mg/dL      AST 30 U/L      ALT 14 U/L      Alkaline Phosphatase 72 U/L      Total Protein 7.0 g/dL      Albumin 4.2 g/dL      Total Bilirubin 0.82 mg/dL      eGFR 88 ml/min/1.73sq m     Narrative:      Specimen Lipemic.  National Kidney Disease Foundation guidelines for Chronic Kidney Disease (CKD):     Stage 1 with normal or high GFR (GFR > 90 mL/min/1.73 square meters)    Stage 2 Mild CKD (GFR = 60-89 mL/min/1.73 square meters)    Stage 3A Moderate CKD (GFR = 45-59 mL/min/1.73 square meters)    Stage 3B Moderate CKD (GFR = 30-44 mL/min/1.73 square meters)    Stage 4 Severe CKD (GFR = 15-29 mL/min/1.73 square meters)    Stage 5 End Stage CKD (GFR <15 mL/min/1.73 square meters)  Note: GFR calculation is accurate only with  a steady state creatinine    CBC and differential [[id number]] Collected: 06/05/25 2337    Lab Status: Final result Specimen: Blood from Hand, Left Updated: 06/05/25 2343     WBC 8.48 Thousand/uL      RBC 5.58 Million/uL      Hemoglobin 16.2 g/dL      Hematocrit 48.0 %      MCV 86 fL      MCH 29.0 pg      MCHC 33.8 g/dL      RDW 13.3 %      MPV 8.9 fL      Platelets 249 Thousands/uL      nRBC 0 /100 WBCs      Segmented % 59 %      Immature Grans % 1 %      Lymphocytes % 31 %      Monocytes % 6 %      Eosinophils Relative 2 %      Basophils Relative 1 %      Absolute Neutrophils 5.00 Thousands/µL      Absolute Immature Grans 0.09 Thousand/uL      Absolute Lymphocytes 2.63 Thousands/µL      Absolute Monocytes 0.51 Thousand/µL      Eosinophils Absolute 0.20 Thousand/µL      Basophils Absolute 0.05 Thousands/µL             CTA dissection protocol chest/abdomen/pelvis   Final Interpretation by Olu Raymond MD (06/06 0143)         1. No thoracic aortic aneurysm or dissection.   2. Minimal aneurysmal dilatation of the infrarenal abdominal aorta measuring 3.1 cm.   3. No evidence of acute process in the chest, abdomen or pelvis.            Computerized Assisted Algorithm (CAA) may have aided analysis of applicable images.         Workstation performed: [id number]             Procedures    ED Medication and Procedure Management   Prior to Admission Medications   Prescriptions Last Dose Informant Patient Reported? Taking?   ARIPiprazole (ABILIFY) 2 mg tablet Not Taking Self, Friend No No   Sig: TAKE 1 TABLET BY MOUTH EVERY DAY   Patient not taking: Reported on 6/5/2025   Multiple Vitamin (multivitamin) capsule 6/4/2025 Morning Self, Friend Yes Yes   Sig: Take 1 capsule by mouth in the morning.   Omega-3 Fatty Acids (fish oil) 1,000 mg  Self, Friend Yes No   Sig: Take 1,000 mg by mouth daily   Patient not taking: Reported on 3/4/2025   SOOLANTRA 1 % CREA  Self, Friend Yes No   Patient not taking: Reported on 3/4/2025    Vitamin D, Cholecalciferol, 1000 units TABS  Self, Friend No No   Sig: Take 1 tablet (1,000 Units total) by mouth daily   aspirin 81 mg CHEW 2025 Morning Self, Friend Yes Yes   Sig: Chew 81 mg   atorvastatin (LIPITOR) 40 mg tablet 2025 Evening Self, Friend No Yes   Sig: TAKE 1 TABLET BY MOUTH EVERYDAY AT BEDTIME   carbidopa-levodopa (SINEMET)  mg per tablet 2025 Self, Friend Yes Yes   Sig: Take 1 tablet by mouth 3 (three) times a day   citalopram (CeleXA) 40 mg tablet  Self, Friend No No   Sig: TAKE 1 TABLET BY MOUTH  DAILY   Patient not taking: Reported on 3/4/2025   doxycycline (ORACEA) 40 MG capsule  Self, Friend No No   Sig: Take 1 capsule (40 mg total) by mouth every morning   Patient not taking: Reported on 3/4/2025   hydrOXYzine HCL (ATARAX) 25 mg tablet 2025 Evening Self, Friend No Yes   Sig: Take 1 tablet (25 mg total) by mouth every 6 (six) hours   Patient taking differently: Take 0.5 mg by mouth every 6 (six) hours   lisinopril (ZESTRIL) 5 mg tablet  Self, Friend No No   Sig: TAKE 1 TABLET BY MOUTH  DAILY   Patient not taking: Reported on 3/4/2025   nortriptyline (PAMELOR) 25 mg capsule 2025 Evening Friend, Self Yes Yes   Sig: Take 50 mg by mouth   sertraline (ZOLOFT) 100 mg tablet 2025 Morning Self, Friend Yes Yes   Si mg   traZODone (DESYREL) 150 mg tablet 2025 Evening Self, Friend No Yes   Sig: Take 1 tablet (150 mg total) by mouth daily at bedtime      Facility-Administered Medications: None     Discharge Medication List as of 2025  2:16 AM        CONTINUE these medications which have NOT CHANGED    Details   aspirin 81 mg CHEW Chew 81 mg, Historical Med      atorvastatin (LIPITOR) 40 mg tablet TAKE 1 TABLET BY MOUTH EVERYDAY AT BEDTIME, Normal      carbidopa-levodopa (SINEMET)  mg per tablet Take 1 tablet by mouth 3 (three) times a day, Historical Med      hydrOXYzine HCL (ATARAX) 25 mg tablet Take 1 tablet (25 mg total) by mouth every 6 (six)  hours, Starting Sun 12/18/2022, Normal      Multiple Vitamin (multivitamin) capsule Take 1 capsule by mouth in the morning., Historical Med      nortriptyline (PAMELOR) 25 mg capsule Take 50 mg by mouth, Starting Thu 1/30/2025, Historical Med      sertraline (ZOLOFT) 100 mg tablet 100 mg, Starting Mon 3/13/2023, Historical Med      traZODone (DESYREL) 150 mg tablet Take 1 tablet (150 mg total) by mouth daily at bedtime, Starting Mon 2/22/2021, Normal      ARIPiprazole (ABILIFY) 2 mg tablet TAKE 1 TABLET BY MOUTH EVERY DAY, Normal      citalopram (CeleXA) 40 mg tablet TAKE 1 TABLET BY MOUTH  DAILY, Normal      doxycycline (ORACEA) 40 MG capsule Take 1 capsule (40 mg total) by mouth every morning, Starting Mon 3/16/2020, Normal      lisinopril (ZESTRIL) 5 mg tablet TAKE 1 TABLET BY MOUTH  DAILY, Normal      Omega-3 Fatty Acids (fish oil) 1,000 mg Take 1,000 mg by mouth daily, Historical Med      SOOLANTRA 1 % CREA Starting Mon 6/10/2019, Historical Med      Vitamin D, Cholecalciferol, 1000 units TABS Take 1 tablet (1,000 Units total) by mouth daily, Starting Fri 2/22/2019, Normal           No discharge procedures on file.  ED SEPSIS DOCUMENTATION   Time reflects when diagnosis was documented in both MDM as applicable and the Disposition within this note       Time User Action Codes Description Comment    6/6/2025  2:13 AM Ulysses Ching [R07.9] Chest pain, unspecified type     6/6/2025  2:13 AM Ulysses Ching [I25.10] Coronary artery disease involving native coronary artery of native heart without angina pectoris     6/6/2025  2:13 AM Ulysses Ching [I71.40] Abdominal aortic aneurysm (AAA) without rupture, unspecified part (HCC)                      [1]   Past Medical History:  Diagnosis Date    Hyperlipidemia     Hypertension     Myocardial infarction (HCC) 2015    Parkinson's disease (HCC)    [2]   Past Surgical History:  Procedure Laterality Date    ANGIOPLASTY      ANKLE FUSION      CARDIAC SURGERY       stent    CARPAL TUNNEL RELEASE Bilateral     COLONOSCOPY      JOINT REPLACEMENT Left 2024    L TKA    KNEE ARTHROSCOPY Bilateral     MENISCECTOMY Bilateral     MS COLONOSCOPY FLX DX W/COLLJ SPEC WHEN PFRMD N/A 2019    Procedure: COLONOSCOPY with polypectomies;  Surgeon: Garfield Meléndez DO;  Location:  MAIN OR;  Service: Gastroenterology    MS SURGICAL ARTHROSCOPY SHOULDER W/ROTATOR CUFF RPR Right 2020    Procedure: SHOULDER ARTHROSCOPIC ROTATOR CUFF REPAIR; SAD, BICEPS TENODESIS;  Surgeon: Kojo Harp MD;  Location: [facility];  Service: Orthopedics    ROTATOR CUFF REPAIR Left     TONSILLECTOMY     [3]   Family History  Problem Relation Name Age of Onset    Breast cancer Mother      COPD Father      Alcohol abuse Neg Hx      Substance Abuse Neg Hx      Mental illness Neg Hx     [4]   Social History  Tobacco Use    Smoking status: Former     Current packs/day: 0.00     Types: Cigarettes     Quit date: 2017     Years since quittin.0    Smokeless tobacco: Never   Vaping Use    Vaping status: Never Used   Substance Use Topics    Alcohol use: Not Currently     Alcohol/week: 1.0 standard drink of alcohol     Types: 1 Cans of beer per week     Comment: Socially    Drug use: Never        Ulysses Ching DO  [phone]

## 2025-06-06 NOTE — DISCHARGE INSTRUCTIONS
You have been seen for chest pain. Please continue your home medication regimen. Return to the emergency department if you develop worsening chest pain, trouble breathing, lightheadedness or any other symptoms of concern. Please follow up with your cardiologist by calling the number provided.

## (undated) DEVICE — 1200CC GUARDIAN II: Brand: GUARDIAN

## (undated) DEVICE — BLADE SHAVER DISSECTOR 4MM 13CM COOLCUT

## (undated) DEVICE — SPONGE PVP SCRUB WING STERILE

## (undated) DEVICE — DRESSING MEPILEX AG BORDER 4 X 4 IN

## (undated) DEVICE — GLOVE SRG BIOGEL ECLIPSE 7

## (undated) DEVICE — TUBING SUCTION 5MM X 12 FT

## (undated) DEVICE — SHOULDER SUSPENSION KIT 6 PER BOX

## (undated) DEVICE — GLOVE SRG BIOGEL 7.5

## (undated) DEVICE — SUT MONOCRYL 4-0 PS-2 18 IN Y496G

## (undated) DEVICE — SINGLE-USE POLYPECTOMY SNARE: Brand: SENSATION SHORT THROW

## (undated) DEVICE — EXPRESSEW III SUTURE NEEDLE FOR USE WITH EXPRESSEW II OR III SUTURE PASSER: Brand: EXPRESSEW

## (undated) DEVICE — DISPOSABLE EQUIPMENT COVER: Brand: SMALL TOWEL DRAPE

## (undated) DEVICE — INTENDED FOR TISSUE SEPARATION, AND OTHER PROCEDURES THAT REQUIRE A SHARP SURGICAL BLADE TO PUNCTURE OR CUT.: Brand: BARD-PARKER SAFETY BLADES SIZE 11, STERILE

## (undated) DEVICE — GLOVE INDICATOR PI UNDERGLOVE SZ 7.5 BLUE

## (undated) DEVICE — ADHESIVE SKIN HIGH VISCOSITY EXOFIN 1ML

## (undated) DEVICE — PACK PBDS SHOULDER ARTHROSCOPY RF

## (undated) DEVICE — THREADED CLEAR CANNULA WITH OBTURATOR 8.5MM X 75MM

## (undated) DEVICE — VAPR COOLPULSE 90 ELECTRODE 90 DEGREES SUCTION WITH INTEGRATED HANDPIECE: Brand: VAPR COOLPULSE

## (undated) DEVICE — CHLORAPREP HI-LITE 26ML ORANGE

## (undated) DEVICE — 3M™ IOBAN™ 2 ANTIMICROBIAL INCISE DRAPE 6650EZ: Brand: IOBAN™ 2

## (undated) DEVICE — THREADED CLEAR CANNULA WITH OBTURATOR 7MM X 75MM

## (undated) DEVICE — LIGHT HANDLE COVER SLEEVE DISP BLUE STELLAR

## (undated) DEVICE — SYRINGE 50ML LL

## (undated) DEVICE — SINGLE-USE BIOPSY FORCEPS: Brand: RADIAL JAW 4

## (undated) DEVICE — LUBRICANT SURGILUBE TUBE 4 OZ  FLIP TOP

## (undated) DEVICE — BRUSH ENDO CLEANING DBL-HEADER